# Patient Record
Sex: FEMALE | Race: WHITE | NOT HISPANIC OR LATINO | Employment: UNEMPLOYED | URBAN - METROPOLITAN AREA
[De-identification: names, ages, dates, MRNs, and addresses within clinical notes are randomized per-mention and may not be internally consistent; named-entity substitution may affect disease eponyms.]

---

## 2017-01-24 DIAGNOSIS — B37.31 YEAST INFECTION OF THE VAGINA: Primary | ICD-10-CM

## 2017-01-24 RX ORDER — FLUCONAZOLE 150 MG/1
150 TABLET ORAL ONCE
Qty: 2 TABLET | Refills: 3 | Status: SHIPPED | OUTPATIENT
Start: 2017-01-24 | End: 2017-01-24

## 2017-01-24 NOTE — TELEPHONE ENCOUNTER
Received refill request for Fluconazole for Estelle. Spoke with Estelle and she had to switch insurance and will no longer be able to see Dr. Gama. She gets yeast infections often and Dr. Gama had it set up so she could have refills available if needed. She is asking for some refills now while she works with her insurance to find a new OB/GYN provider. Will pend to Dr. Gama for approval.

## 2017-11-27 DIAGNOSIS — Z30.41 ENCOUNTER FOR SURVEILLANCE OF CONTRACEPTIVE PILLS: ICD-10-CM

## 2017-11-27 RX ORDER — DROSPIRENONE AND ETHINYL ESTRADIOL 0.02-3(28)
1 KIT ORAL DAILY
Qty: 112 TABLET | Refills: 3 | Status: SHIPPED | OUTPATIENT
Start: 2017-11-27 | End: 2018-11-06

## 2017-11-27 NOTE — TELEPHONE ENCOUNTER
Received refill request for OCP.  Last in clinic 7/2016.  OK to refill per protocol. Called and left voicemail encouraging pt schedule annual visit and informed refill sent to pharmacy

## 2018-11-06 ENCOUNTER — OFFICE VISIT (OUTPATIENT)
Dept: OBGYN | Facility: CLINIC | Age: 28
End: 2018-11-06
Attending: OBSTETRICS & GYNECOLOGY
Payer: COMMERCIAL

## 2018-11-06 VITALS
BODY MASS INDEX: 25.05 KG/M2 | HEIGHT: 66 IN | HEART RATE: 75 BPM | SYSTOLIC BLOOD PRESSURE: 125 MMHG | DIASTOLIC BLOOD PRESSURE: 80 MMHG | WEIGHT: 155.9 LBS

## 2018-11-06 DIAGNOSIS — Z31.69 PRE-CONCEPTION COUNSELING: ICD-10-CM

## 2018-11-06 DIAGNOSIS — Z23 NEED FOR PROPHYLACTIC VACCINATION AND INOCULATION AGAINST INFLUENZA: ICD-10-CM

## 2018-11-06 DIAGNOSIS — N90.7 LABIAL CYST: Primary | ICD-10-CM

## 2018-11-06 DIAGNOSIS — Z12.4 SCREENING FOR MALIGNANT NEOPLASM OF CERVIX: ICD-10-CM

## 2018-11-06 PROCEDURE — G0145 SCR C/V CYTO,THINLAYER,RESCR: HCPCS | Performed by: OBSTETRICS & GYNECOLOGY

## 2018-11-06 PROCEDURE — G0463 HOSPITAL OUTPT CLINIC VISIT: HCPCS | Mod: ZF

## 2018-11-06 PROCEDURE — G0008 ADMIN INFLUENZA VIRUS VAC: HCPCS | Mod: ZF

## 2018-11-06 PROCEDURE — 90686 IIV4 VACC NO PRSV 0.5 ML IM: CPT | Mod: ZF

## 2018-11-06 PROCEDURE — 25000128 H RX IP 250 OP 636: Mod: ZF

## 2018-11-06 PROCEDURE — 87624 HPV HI-RISK TYP POOLED RSLT: CPT | Performed by: OBSTETRICS & GYNECOLOGY

## 2018-11-06 RX ORDER — ZALEPLON 10 MG/1
10 CAPSULE ORAL PRN
COMMUNITY
End: 2019-09-17

## 2018-11-06 NOTE — LETTER
"2018       RE: Estelle Escobar  [address] [address]     Dear Colleague,    Thank you for referring your patient, Estelle Escobar, to the [facility] at [facility]. Please see a copy of my visit note below.    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   - She had symptoms of pelvic pain 2 weeks ago which has since subsided. At that time, she did a self visual exam and noticed some swelling on her left labial minora. She denied any drainage, cyst resolved on own. Did occur following shaving. Looked with mirror was on upper aspect of labia minora.     - She is also seeking fertility counseling. She discontinued her OCP in Aug. Her LMP was in mid Oct, but she does not know the exact date. Had irregular menses prior to starting pill. Is taking folic acid. Wonders if LEEP will affect fertility and/or delivery.    - Her last pap smear following LEEP was approximately 12 months ago with a different provider.    - Patient was recently  and considering open marriage. Had recent STI screening, wonders about frequency of screening, HPV vaccination.     Objective: /80 (BP Location: Right arm, Patient Position: Chair)  Pulse 75  Ht 1.676 m (5' 6\")  Wt 70.7 kg (155 lb 14.4 oz)  LMP 10/15/2018 (Approximate)    BMI 25.16 kg/m2    Pelvic Exam:  Vulva: No external lesions, normal hair distribution, normal architecture  Vagina: Moist, pink, no abnormal discharge, well rugated, no lesions  Cervix: smooth, pink, no visible lesions  Uterus: Normal size, anteverted, non-tender, mobile  Adnexa: Non-tender, no masses      Assessment:   28 year old P0 here for concerns of labial cyst which has resolved, pre-conception counseling as well as h/o BLANCO 2.    Plan:  - There is no evidence of " labial cyst or infection at this time. Patient was advised to follow up in the clinic if her symptoms return or if she has any abnormal vaginal bleeding or vaginal discharge. Reviewed shaving as risk factor for   - Preconception counseling. Patient was educated about the best strategies to increase her chances of pregnancy including tracking her menstrual cycles and having intercourse around the time of ovulation. Patient was informed of the different cell phone applications to assist her in tracking her menstrual cycles and time of ovulation. Is already taking folic acid.   - Patient is s/p LEEP 2 years ago. 12 month neg pap/neg HPV. Due for co-testing today. If negative can proceed with 3 year co-test.    - Reviewed STI screening options as needed. Also [facility] .     Follow up as needed    Isaiah Haro MS-3 acting as a scribe for Dr. Gama.   I, Dr. Gama, personally performed the services described in this documentation, as scribed by Isaiah Haro in my presence, and it is both accurate and complete.   Alina Gama MD           Again, thank you for allowing me to participate in the care of your patient.      Sincerely,    Alina Gama MD

## 2018-11-06 NOTE — MR AVS SNAPSHOT
"              After Visit Summary   11/6/2018    Estelle Escobar    MRN: [id number]           Patient Information     Date Of Birth          1990        Visit Information        Provider Department      11/6/2018 9:30 AM Alina Gama MD [facility]        Today's Diagnoses     Labial cyst    -  1    Need for prophylactic vaccination and inoculation against influenza        Screening for malignant neoplasm of cervix        Pre-conception counseling           Follow-ups after your visit        Who to contact     Please call your clinic at [phone] to:    Ask questions about your health    Make or cancel appointments    Discuss your medicines    Learn about your test results    Speak to your doctor            Additional Information About Your Visit        Ad[employer] Information     Subtext gives you secure access to your electronic health record. If you see a primary care provider, you can also send messages to your care team and make appointments. If you have questions, please call your primary care clinic.  If you do not have a primary care provider, please call [phone] and they will assist you.      Subtext is an electronic gateway that provides easy, online access to your medical records. With Subtext, you can request a clinic appointment, read your test results, renew a prescription or communicate with your care team.     To access your existing account, please contact your [facility] or call [phone] for assistance.        Care EveryWhere ID     This is your Care EveryWhere ID. This could be used by other organizations to access your [facility] medical records  [id number]        Your Vitals Were     Pulse Height Last Period Breastfeeding? BMI (Body Mass Index)       75 1.676 m (5' 6\") 10/15/2018 (Approximate) No 25.16 kg/m2        Blood Pressure from Last 3 Encounters:   11/06/18 125/80   08/30/16 123/80   07/12/16 123/80    Weight from " Last 3 Encounters:   11/06/18 70.7 kg (155 lb 14.4 oz)   08/30/16 71.4 kg (157 lb 6.4 oz)   07/12/16 70.6 kg (155 lb 9.6 oz)              We Performed the Following     HC FLU VAC PRESRV FREE QUAD SPLIT VIR 3+YRS IM     HPV High Risk Types DNA Cervical     Obtaining, preparing and conveyance of cervical or vaginal smear to laboratory.     Pap imaged thin layer screen with HPV - recommended age 30 - 65 years (select HPV order below)        Primary Care Provider    None Specified       No primary provider on file.        Equal Access to Services     [facility]: Hadjim Escobar, joan phillips, aristeo gale, maurisio sim . So [facility] [phone].    ATENCIÓN: Si habla español, tiene a guthrie disposición servicios gratuitos de asistencia lingüística. Llame al [phone].    We comply with applicable federal civil rights laws and [place] laws. We do not discriminate on the basis of race, color, national origin, age, disability, sex, sexual orientation, or gender identity.            Thank you!     Thank you for choosing [facility]  for your care. Our goal is always to provide you with excellent care. Hearing back from our patients is one way we can continue to improve our services. Please take a few minutes to complete the written survey that you may receive in the mail after your visit with us. Thank you!             Your Updated Medication List - Protect others around you: Learn how to safely use, store and throw away your medicines at www.disposemymeds.org.          This list is accurate as of 11/6/18 11:59 PM.  Always use your most recent med list.                   Brand Name Dispense Instructions for use Diagnosis    RITALIN 5 MG tablet   Generic drug:  methylphenidate      Take 5 mg by mouth 2 times daily        tretinoin 0.025 % cream    RETIN-A    45 g    Use every night to entire face at bedtime. Skip to every night if too  irritating.    Blackhead, Acne vulgaris       zaleplon 10 MG capsule    SONATA     Take 10 mg by mouth as needed for sleep

## 2018-11-06 NOTE — NURSING NOTE
Chief Complaint   Patient presents with     Follow Up For     Possible labial cyst and wants to discuss pregnancy planning and her menstrual cycle       See KENNETH Lin 11/6/2018    FLU VACCINE QUESTIONNAIRE:  Ask the following questions of all parties who want influenza vaccination:     CONTRAINDICATIONS  1.  Is the patient age less than 6 months?  NO  2.  Has the person to be vaccinated ever had Guillain-Carmine syndrome? NO  3.  Has the person to be vaccinated had the vaccine this year? NO  4.  Is the person to be vaccinated sick today? NO  5.  Does the person to be vaccinated have an allergy to eggs or a component of the vaccine? NO  6.  Has the person to vaccinated ever had a serious reaction to an influenza vaccination in the past? NO                             INFLUENZA VACCINATION NOTE      Information sheet given to patient and questions answered.

## 2018-11-06 NOTE — LETTER
Date:November 8, 2018      Patient was self referred, no letter generated. Do not send.        [facility] Information

## 2018-11-06 NOTE — PROGRESS NOTES
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   - She had symptoms of pelvic pain 2 weeks ago which has since subsided. At that time, she did a self visual exam and noticed some swelling on her left labial minora. She denied any drainage, cyst resolved on own. Did occur following shaving. Looked with mirror was on upper aspect of labia minora.     - She is also seeking fertility counseling. She discontinued her OCP in Aug. Her LMP was in mid Oct, but she does not know the exact date. Had irregular menses prior to starting pill. Is taking folic acid. Wonders if LEEP will affect fertility and/or delivery.    - Her last pap smear following LEEP was approximately 12 months ago with a different provider.    - Patient was recently  and considering open marriage. Had recent STI screening, wonders about frequency of screening, HPV vaccination.     Objective: /80 (BP Location: Right arm, Patient Position: Chair)  Pulse 75  Ht 1.676 m (5' 6\")  Wt 70.7 kg (155 lb 14.4 oz)  LMP 10/15/2018 (Approximate)    BMI 25.16 kg/m2    Pelvic Exam:  Vulva: No external lesions, normal hair distribution, normal architecture  Vagina: Moist, pink, no abnormal discharge, well rugated, no lesions  Cervix: smooth, pink, no visible lesions  Uterus: Normal size, anteverted, non-tender, mobile  Adnexa: Non-tender, no masses      Assessment:   28 year old P0 here for concerns of labial cyst which has resolved, pre-conception counseling as well as h/o BLANCO 2.    Plan:  - There is no evidence of labial cyst or infection at this time. Patient was advised to follow up in the clinic if her symptoms return or if she has any abnormal vaginal bleeding or vaginal discharge. Reviewed shaving as risk factor for   - Preconception counseling. Patient was educated about the best strategies to " increase her chances of pregnancy including tracking her menstrual cycles and having intercourse around the time of ovulation. Patient was informed of the different cell phone applications to assist her in tracking her menstrual cycles and time of ovulation. Is already taking folic acid.   - Patient is s/p LEEP 2 years ago. 12 month neg pap/neg HPV. Due for co-testing today. If negative can proceed with 3 year co-test.    - Reviewed STI screening options as needed. Also discussed [facility] .     Follow up as needed    Isaiah Haro MS-3 acting as a scribe for Dr. Gama.   I, Dr. Gama, personally performed the services described in this documentation, as scribed by Isaiah Haro in my presence, and it is both accurate and complete.   Alina Gama MD

## 2018-11-09 LAB
COPATH REPORT: NORMAL
PAP: NORMAL

## 2018-11-12 ENCOUNTER — TELEPHONE (OUTPATIENT)
Dept: OBGYN | Facility: CLINIC | Age: 28
End: 2018-11-12

## 2018-11-12 LAB
FINAL DIAGNOSIS: NORMAL
HPV HR 12 DNA CVX QL NAA+PROBE: NEGATIVE
HPV16 DNA SPEC QL NAA+PROBE: NEGATIVE
HPV18 DNA SPEC QL NAA+PROBE: NEGATIVE
SPECIMEN DESCRIPTION: NORMAL
SPECIMEN SOURCE CVX/VAG CYTO: NORMAL

## 2019-02-07 ENCOUNTER — TELEPHONE (OUTPATIENT)
Dept: OBGYN | Facility: CLINIC | Age: 29
End: 2019-02-07

## 2019-02-07 DIAGNOSIS — N91.4 SECONDARY OLIGOMENORRHEA: Primary | ICD-10-CM

## 2019-02-07 NOTE — TELEPHONE ENCOUNTER
Spoke with Estelle who is attempting pregnancy with her  since September. She states she has always struggled with irregular periods and has history of CIN2 with LEEP.  She has now missed two cycles. Her LMP is 12/19. She has taken multiple negative pregnancy tests. She is wondering if she needs to do anything at this time. Does she need to come in or does she need blood testing. Nurse will route to Dr. Gama for advice.

## 2019-02-07 NOTE — TELEPHONE ENCOUNTER
Spoke with Estelle. Dr. Gama ordered labs and would like her to follow up in clinic after labwork is done. Arranged for appointment at 2pm on 2/13/19. Patient agreeable to this plan.

## 2019-02-08 DIAGNOSIS — N91.4 SECONDARY OLIGOMENORRHEA: ICD-10-CM

## 2019-02-08 LAB — PROLACTIN SERPL-MCNC: 4 UG/L (ref 3–27)

## 2019-02-08 PROCEDURE — 84270 ASSAY OF SEX HORMONE GLOBUL: CPT | Performed by: OBSTETRICS & GYNECOLOGY

## 2019-02-08 PROCEDURE — 84443 ASSAY THYROID STIM HORMONE: CPT | Performed by: OBSTETRICS & GYNECOLOGY

## 2019-02-08 PROCEDURE — 84146 ASSAY OF PROLACTIN: CPT | Performed by: OBSTETRICS & GYNECOLOGY

## 2019-02-08 PROCEDURE — 36415 COLL VENOUS BLD VENIPUNCTURE: CPT | Performed by: OBSTETRICS & GYNECOLOGY

## 2019-02-08 PROCEDURE — 84403 ASSAY OF TOTAL TESTOSTERONE: CPT | Performed by: OBSTETRICS & GYNECOLOGY

## 2019-02-09 LAB — TSH SERPL DL<=0.005 MIU/L-ACNC: 1.08 MU/L (ref 0.4–4)

## 2019-02-12 LAB
SHBG SERPL-SCNC: 57 NMOL/L (ref 30–135)
TESTOST FREE SERPL-MCNC: 0.3 NG/DL (ref 0.08–0.74)
TESTOST SERPL-MCNC: 25 NG/DL (ref 8–60)

## 2019-07-17 ENCOUNTER — TELEPHONE (OUTPATIENT)
Dept: OBGYN | Facility: CLINIC | Age: 29
End: 2019-07-17

## 2019-07-17 NOTE — TELEPHONE ENCOUNTER
Estelle called to discuss receiving HPV vaccine (new formulation). Estelle did receive series previously, but is interested in new vaccine if appropriate. Pt is newly pregnant and has intake scheduled in 3 weeks.    Advised pt it is not recommended to receive HPV vaccine during pregnancy and not recommended currently to receive new series if previously received full HPV series. Patient expressed understanding, but would like to discuss further a upcoming appointment. Added to appt notes

## 2019-08-04 PROBLEM — Z34.01 SUPERVISION OF NORMAL FIRST PREGNANCY IN FIRST TRIMESTER: Status: ACTIVE | Noted: 2019-08-04

## 2019-08-05 ENCOUNTER — ANCILLARY PROCEDURE (OUTPATIENT)
Dept: ULTRASOUND IMAGING | Facility: CLINIC | Age: 29
End: 2019-08-05
Attending: ADVANCED PRACTICE MIDWIFE
Payer: COMMERCIAL

## 2019-08-05 ENCOUNTER — OFFICE VISIT (OUTPATIENT)
Dept: OBGYN | Facility: CLINIC | Age: 29
End: 2019-08-05
Attending: ADVANCED PRACTICE MIDWIFE
Payer: COMMERCIAL

## 2019-08-05 VITALS
SYSTOLIC BLOOD PRESSURE: 120 MMHG | DIASTOLIC BLOOD PRESSURE: 80 MMHG | HEART RATE: 87 BPM | HEIGHT: 66 IN | WEIGHT: 149.1 LBS | BODY MASS INDEX: 23.96 KG/M2

## 2019-08-05 DIAGNOSIS — Z34.91 ENCOUNTER FOR SUPERVISION OF NORMAL PREGNANCY IN FIRST TRIMESTER, UNSPECIFIED GRAVIDITY: ICD-10-CM

## 2019-08-05 DIAGNOSIS — Z34.01 SUPERVISION OF NORMAL FIRST PREGNANCY IN FIRST TRIMESTER: ICD-10-CM

## 2019-08-05 LAB
ABO + RH BLD: NORMAL
ABO + RH BLD: NORMAL
BASOPHILS # BLD AUTO: 0 10E9/L (ref 0–0.2)
BASOPHILS NFR BLD AUTO: 0.1 %
BLD GP AB SCN SERPL QL: NORMAL
BLOOD BANK CMNT PATIENT-IMP: NORMAL
DIFFERENTIAL METHOD BLD: NORMAL
EOSINOPHIL # BLD AUTO: 0.1 10E9/L (ref 0–0.7)
EOSINOPHIL NFR BLD AUTO: 0.7 %
ERYTHROCYTE [DISTWIDTH] IN BLOOD BY AUTOMATED COUNT: 12.1 % (ref 10–15)
HCT VFR BLD AUTO: 40.3 % (ref 35–47)
HGB BLD-MCNC: 13.4 G/DL (ref 11.7–15.7)
IMM GRANULOCYTES # BLD: 0 10E9/L (ref 0–0.4)
IMM GRANULOCYTES NFR BLD: 0.3 %
LYMPHOCYTES # BLD AUTO: 2.6 10E9/L (ref 0.8–5.3)
LYMPHOCYTES NFR BLD AUTO: 25.8 %
MCH RBC QN AUTO: 30.9 PG (ref 26.5–33)
MCHC RBC AUTO-ENTMCNC: 33.3 G/DL (ref 31.5–36.5)
MCV RBC AUTO: 93 FL (ref 78–100)
MONOCYTES # BLD AUTO: 0.6 10E9/L (ref 0–1.3)
MONOCYTES NFR BLD AUTO: 6.1 %
NEUTROPHILS # BLD AUTO: 6.7 10E9/L (ref 1.6–8.3)
NEUTROPHILS NFR BLD AUTO: 67 %
NRBC # BLD AUTO: 0 10*3/UL
NRBC BLD AUTO-RTO: 0 /100
PLATELET # BLD AUTO: 183 10E9/L (ref 150–450)
RBC # BLD AUTO: 4.33 10E12/L (ref 3.8–5.2)
SPECIMEN EXP DATE BLD: NORMAL
WBC # BLD AUTO: 10 10E9/L (ref 4–11)

## 2019-08-05 PROCEDURE — 86762 RUBELLA ANTIBODY: CPT | Performed by: ADVANCED PRACTICE MIDWIFE

## 2019-08-05 PROCEDURE — 82306 VITAMIN D 25 HYDROXY: CPT | Performed by: ADVANCED PRACTICE MIDWIFE

## 2019-08-05 PROCEDURE — 87389 HIV-1 AG W/HIV-1&-2 AB AG IA: CPT | Performed by: ADVANCED PRACTICE MIDWIFE

## 2019-08-05 PROCEDURE — 86850 RBC ANTIBODY SCREEN: CPT | Performed by: ADVANCED PRACTICE MIDWIFE

## 2019-08-05 PROCEDURE — 86787 VARICELLA-ZOSTER ANTIBODY: CPT | Performed by: ADVANCED PRACTICE MIDWIFE

## 2019-08-05 PROCEDURE — 87340 HEPATITIS B SURFACE AG IA: CPT | Performed by: ADVANCED PRACTICE MIDWIFE

## 2019-08-05 PROCEDURE — 86706 HEP B SURFACE ANTIBODY: CPT | Performed by: ADVANCED PRACTICE MIDWIFE

## 2019-08-05 PROCEDURE — G0463 HOSPITAL OUTPT CLINIC VISIT: HCPCS

## 2019-08-05 PROCEDURE — 86780 TREPONEMA PALLIDUM: CPT | Performed by: ADVANCED PRACTICE MIDWIFE

## 2019-08-05 PROCEDURE — 86900 BLOOD TYPING SEROLOGIC ABO: CPT | Performed by: ADVANCED PRACTICE MIDWIFE

## 2019-08-05 PROCEDURE — 86901 BLOOD TYPING SEROLOGIC RH(D): CPT | Performed by: ADVANCED PRACTICE MIDWIFE

## 2019-08-05 PROCEDURE — 76801 OB US < 14 WKS SINGLE FETUS: CPT

## 2019-08-05 PROCEDURE — 87086 URINE CULTURE/COLONY COUNT: CPT | Performed by: ADVANCED PRACTICE MIDWIFE

## 2019-08-05 PROCEDURE — 85025 COMPLETE CBC W/AUTO DIFF WBC: CPT | Performed by: ADVANCED PRACTICE MIDWIFE

## 2019-08-05 PROCEDURE — 36415 COLL VENOUS BLD VENIPUNCTURE: CPT | Performed by: ADVANCED PRACTICE MIDWIFE

## 2019-08-05 PROCEDURE — 86803 HEPATITIS C AB TEST: CPT | Performed by: ADVANCED PRACTICE MIDWIFE

## 2019-08-05 ASSESSMENT — MIFFLIN-ST. JEOR: SCORE: 1423.06

## 2019-08-05 NOTE — PROGRESS NOTES
[facility] OB Intake note  Subjective   28 year old woman presents to clinic for initiation of OB care with her , Tian. They decided to attempt pregnancy last August when she stopped birth control pills - she was nervous about a family history significant for early menopause.  Patient's last menstrual period was 06/10/2019.  at 8w0d by LMP Estimated Date of Delivery: Mar 16, 2020 based on LMP. Reviewed dating ultrasound which shows di/di twins!. Pregnancy is planned.      Symptoms since LMP include nausea and fatigue. Patient has tried these relief measures: small frequent meals, increased rest and increased fluids.    - Genetic/Infection questionnaire completed, risks include: binge drank on 19, was taking ritalin and sonata until the 19. Pt  does not have a recent known exposure to Parvo or CMV so IgG/IgM testing WILL NOT be ordered.   Have you traveled during the pregnancy?No  Have your sexual partner(s) travelled during the pregnancy?No    - Current Medications:   Current Outpatient Medications   Medication Sig Dispense Refill     methylphenidate (RITALIN) 5 MG tablet Take 5 mg by mouth 2 times daily       tretinoin (RETIN-A) 0.025 % cream Use every night to entire face at bedtime. Skip to every night if too irritating. (Patient not taking: Reported on 2019) 45 g 11     zaleplon (SONATA) 10 MG capsule Take 10 mg by mouth as needed for sleep       - Co-morbids    Past Medical History:   Diagnosis Date     Abnormal Pap smear of cervix 2013     -Has had two NILM PAPs (16 with +HPV and 2018 with -HPV) hx of biopsy x2.    - Risk for GDM -  does not  have Personal history of GDM, BMI>30, h/o prediabetes/glucose intolerance, first degree relative with GDM or DM    WILL NOT have an early GCT and possible Hgb A1C    - High Risk for Pre E- meets one of following criteria The patient does h/o Pre Eclampsia, Current multi fetal gestation, Pre Gestational Diabetes (Type 1 or Type 2), chronic  hypertension, renal disease, Autoimmune disease (systematic lupus erythematosus, antiphospholipid syndrome) so WILL start low dose aspirin (81mg) starting between 12 and 28 weeks to prevent early onset preeclampsia.    - Moderate risk - meets more than one of several moderate risk facrtors for Pre E - Nulliparity, Obesity (body mass index >30 kg/m2),Family history of preeclampsia (mother or sister), Sociodemographic characteristics ( race, low socioeconomic status), Age ?35 years, Personal history factors (e.g., low birthweight or small for gestational age, previous adverse pregnancy outcome, >10-year pregnancy interval)  so WILL NOT consider starting low dose aspirin (81mg) starting between 12 and 28 weeks to prevent early onset preeclampsia.    - The patient  does not have a history of spontaneous  birth so  WILL NOT consider progesterone starting at 16-20 weeks and/or serial transvaginal cervical length ultrasounds from 16-24 weeks.     -The patient does not have a history of immunosuppresion or HIV so Toxoplasma IgG/IgM WILL NOT be ordered.     PERSONAL/SOCIAL HISTORY   lives with their spouse and two dogs.  Employment: Full time. Her job involves light activity . 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  History of anxiety or depression - no    Additional items: Denies past or present domestic violence, sexual and psychological abuse.    Objective  -VS: reviewed and within normal limits   -General appearance: no acute distress, patient is comfortable   NEUROLOGICAL/PSYCHIATRIC   - Orientated x3,   -Mood and affect: : normal     See US report with today's date.    Assessment/Plan  Estelle was seen today for prenatal care.    Diagnoses and all orders for this visit:    Supervision of normal first pregnancy in first trimester  -     25- OH-Vitamin D  -     ABO/Rh Type and Screen  -     CBC with Platelets Differential  -     Hepatitis B Surface Antigen  -     HIV  Antigen Antibody Combo  -     Rubella Antibody IgG Quantitative  -     Treponema Abs w Reflex to RPR and Titer  -     Urine Culture Aerobic Bacterial  -     Hepatitis C antibody  -     Hepatitis B Surface Antibody  -     Varicella Zoster Virus Antibody IgG    28 year old  8w0d weeks of pregnancy with ELENA of Mar 16, 2020 by LMP of Patient's last menstrual period was 06/10/2019.. Ultrasound confirms.   Outpatient Encounter Medications as of 2019   Medication Sig Dispense Refill     methylphenidate (RITALIN) 5 MG tablet Take 5 mg by mouth 2 times daily       tretinoin (RETIN-A) 0.025 % cream Use every night to entire face at bedtime. Skip to every night if too irritating. (Patient not taking: Reported on 2019) 45 g 11     zaleplon (SONATA) 10 MG capsule Take 10 mg by mouth as needed for sleep       No facility-administered encounter medications on file as of 2019.       Orders Placed This Encounter   Procedures     25- OH-Vitamin D     CBC with Platelets Differential     Hepatitis B Surface Antigen     HIV Antigen Antibody Combo     Rubella Antibody IgG Quantitative     Treponema Abs w Reflex to RPR and Titer     Hepatitis C antibody     Hepatitis B Surface Antibody     Varicella Zoster Virus Antibody IgG     ABO/Rh Type and Screen                 Orders Placed This Encounter   Procedures     25- OH-Vitamin D     CBC with Platelets Differential     Hepatitis B Surface Antigen     HIV Antigen Antibody Combo     Rubella Antibody IgG Quantitative     Treponema Abs w Reflex to RPR and Titer     Hepatitis C antibody     Hepatitis B Surface Antibody     Varicella Zoster Virus Antibody IgG     ABO/Rh Type and Screen     - Oriented to Practice, types of care, and how to reach a provider.  Pt prefers MD for twin pregnancy - has seen Lanette for her hx of abnormal pap's.  - Patient received 1st trimester new OB education packet complete with aide of [employer] [employer] booklet including information on genetic  screening test options.  - Patient desires 1st trimester screening which was ordered.  - Educational handout on the prevention of infections diseases during pregnancy provided.  - Patient was encouraged to start prenatal vitamins as tolerated.    - Patient was sent to lab for routine OB labs including varicella (pt was unsure if she had chicken pox as child).  Hepatitis C screening was ordered.   - Pregnancy concerns to be addressed by provider at new OB exam include: please discuss recommendation for starting asa for multifetal pregnancy, please review if LEEP was actually done - only noted biopsies with colposcopies in notes.     Pt to RTO for NOB visit with US in 2 weeks and prn if questions or concerns    Xiomy Sanchez CNM

## 2019-08-05 NOTE — LETTER
2019       RE: Estelle Escobar  [address] [facility] [address]     Dear Colleague,    Thank you for referring your patient, Estelle Escobar, to the [facility] at [facility]. Please see a copy of my visit note below.    [facility] OB Intake note  Subjective   28 year old woman presents to clinic for initiation of OB care with her , Tian. They decided to attempt pregnancy last August when she stopped birth control pills - she was nervous about a family history significant for early menopause.  Patient's last menstrual period was 06/10/2019.  at 8w0d by LMP Estimated Date of Delivery: Mar 16, 2020 based on LMP. Reviewed dating ultrasound which shows di/di twins!. Pregnancy is planned.      Symptoms since LMP include nausea and fatigue. Patient has tried these relief measures: small frequent meals, increased rest and increased fluids.    - Genetic/Infection questionnaire completed, risks include: binge drank on 19, was taking ritalin and sonata until the 19. Pt  does not have a recent known exposure to Parvo or CMV so IgG/IgM testing WILL NOT be ordered.   Have you traveled during the pregnancy?No  Have your sexual partner(s) travelled during the pregnancy?No    - Current Medications:   Current Outpatient Medications   Medication Sig Dispense Refill     methylphenidate (RITALIN) 5 MG tablet Take 5 mg by mouth 2 times daily       tretinoin (RETIN-A) 0.025 % cream Use every night to entire face at bedtime. Skip to every night if too irritating. (Patient not taking: Reported on 2019) 45 g 11     zaleplon (SONATA) 10 MG capsule Take 10 mg by mouth as needed for sleep       - Co-morbids    Past Medical History:   Diagnosis Date     Abnormal Pap smear of cervix 2013     -Has had two NILM PAPs (16 with +HPV and 2018 with -HPV) hx of biopsy x2.    - Risk for GDM -  does not  have Personal history of GDM, BMI>30, h/o  prediabetes/glucose intolerance, first degree relative with GDM or DM    WILL NOT have an early GCT and possible Hgb A1C    - High Risk for Pre E- meets one of following criteria The patient does h/o Pre Eclampsia, Current multi fetal gestation, Pre Gestational Diabetes (Type 1 or Type 2), chronic hypertension, renal disease, Autoimmune disease (systematic lupus erythematosus, antiphospholipid syndrome) so WILL start low dose aspirin (81mg) starting between 12 and 28 weeks to prevent early onset preeclampsia.    - Moderate risk - meets more than one of several moderate risk facrtors for Pre E - Nulliparity, Obesity (body mass index >30 kg/m2),Family history of preeclampsia (mother or sister), Sociodemographic characteristics ( race, low socioeconomic status), Age ?35 years, Personal history factors (e.g., low birthweight or small for gestational age, previous adverse pregnancy outcome, >10-year pregnancy interval)  so WILL NOT consider starting low dose aspirin (81mg) starting between 12 and 28 weeks to prevent early onset preeclampsia.    - The patient  does not have a history of spontaneous  birth so  WILL NOT consider progesterone starting at 16-20 weeks and/or serial transvaginal cervical length ultrasounds from 16-24 weeks.     -The patient does not have a history of immunosuppresion or HIV so Toxoplasma IgG/IgM WILL NOT be ordered.     PERSONAL/SOCIAL HISTORY   lives with their spouse and two dogs.  Employment: Full time. Her job involves light activity . 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  History of anxiety or depression - no    Additional items: Denies past or present domestic violence, sexual and psychological abuse.    Objective  -VS: reviewed and within normal limits   -General appearance: no acute distress, patient is comfortable   NEUROLOGICAL/PSYCHIATRIC   - Orientated x3,   -Mood and affect: : normal     See US report with today's  date.    Assessment/Plan  Estelle was seen today for prenatal care.    Diagnoses and all orders for this visit:    Supervision of normal first pregnancy in first trimester  -     25- OH-Vitamin D  -     ABO/Rh Type and Screen  -     CBC with Platelets Differential  -     Hepatitis B Surface Antigen  -     HIV Antigen Antibody Combo  -     Rubella Antibody IgG Quantitative  -     Treponema Abs w Reflex to RPR and Titer  -     Urine Culture Aerobic Bacterial  -     Hepatitis C antibody  -     Hepatitis B Surface Antibody  -     Varicella Zoster Virus Antibody IgG    28 year old  8w0d weeks of pregnancy with ELENA of Mar 16, 2020 by LMP of Patient's last menstrual period was 06/10/2019.. Ultrasound confirms.   Outpatient Encounter Medications as of 2019   Medication Sig Dispense Refill     methylphenidate (RITALIN) 5 MG tablet Take 5 mg by mouth 2 times daily       tretinoin (RETIN-A) 0.025 % cream Use every night to entire face at bedtime. Skip to every night if too irritating. (Patient not taking: Reported on 2019) 45 g 11     zaleplon (SONATA) 10 MG capsule Take 10 mg by mouth as needed for sleep       No facility-administered encounter medications on file as of 2019.       Orders Placed This Encounter   Procedures     25- OH-Vitamin D     CBC with Platelets Differential     Hepatitis B Surface Antigen     HIV Antigen Antibody Combo     Rubella Antibody IgG Quantitative     Treponema Abs w Reflex to RPR and Titer     Hepatitis C antibody     Hepatitis B Surface Antibody     Varicella Zoster Virus Antibody IgG     ABO/Rh Type and Screen                 Orders Placed This Encounter   Procedures     25- OH-Vitamin D     CBC with Platelets Differential     Hepatitis B Surface Antigen     HIV Antigen Antibody Combo     Rubella Antibody IgG Quantitative     Treponema Abs w Reflex to RPR and Titer     Hepatitis C antibody     Hepatitis B Surface Antibody     Varicella Zoster Virus Antibody IgG     ABO/Rh  Type and Screen     - Oriented to Practice, types of care, and how to reach a provider.  Pt prefers MD for twin pregnancy - has seen Lanette for her hx of abnormal pap's.  - Patient received 1st trimester new OB education packet complete with aide of [facility] Expect[facility] booklet including information on genetic screening test options.  - Patient desires 1st trimester screening which was ordered.  - Educational handout on the prevention of infections diseases during pregnancy provided.  - Patient was encouraged to start prenatal vitamins as tolerated.    - Patient was sent to lab for routine OB labs including varicella (pt was unsure if she had chicken pox as child).  Hepatitis C screening was ordered.   - Pregnancy concerns to be addressed by provider at new OB exam include: please discuss recommendation for starting asa for multifetal pregnancy, please review if LEEP was actually done - only noted biopsies with colposcopies in notes.     Pt to RTO for NOB visit with US in 2 weeks and prn if questions or concerns    Xiomy Sanchez CNM

## 2019-08-06 ENCOUNTER — TRANSCRIBE ORDERS (OUTPATIENT)
Dept: MATERNAL FETAL MEDICINE | Facility: CLINIC | Age: 29
End: 2019-08-06

## 2019-08-06 DIAGNOSIS — O26.90 PREGNANCY RELATED CONDITION, ANTEPARTUM: Primary | ICD-10-CM

## 2019-08-06 LAB
BACTERIA SPEC CULT: NO GROWTH
DEPRECATED CALCIDIOL+CALCIFEROL SERPL-MC: 46 UG/L (ref 20–75)
HBV SURFACE AB SERPL IA-ACNC: 264.57 M[IU]/ML
HBV SURFACE AG SERPL QL IA: NONREACTIVE
HCV AB SERPL QL IA: NONREACTIVE
HIV 1+2 AB+HIV1 P24 AG SERPL QL IA: NONREACTIVE
Lab: NORMAL
RUBV IGG SERPL IA-ACNC: 19 IU/ML
SPECIMEN SOURCE: NORMAL
T PALLIDUM AB SER QL: NONREACTIVE
VZV IGG SER QL IA: 5 AI (ref 0–0.8)

## 2019-08-21 ENCOUNTER — ANCILLARY PROCEDURE (OUTPATIENT)
Dept: ULTRASOUND IMAGING | Facility: CLINIC | Age: 29
End: 2019-08-21
Attending: ADVANCED PRACTICE MIDWIFE
Payer: COMMERCIAL

## 2019-08-21 ENCOUNTER — OFFICE VISIT (OUTPATIENT)
Dept: OBGYN | Facility: CLINIC | Age: 29
End: 2019-08-21
Attending: ADVANCED PRACTICE MIDWIFE
Payer: COMMERCIAL

## 2019-08-21 VITALS
WEIGHT: 147.1 LBS | DIASTOLIC BLOOD PRESSURE: 75 MMHG | HEART RATE: 91 BPM | SYSTOLIC BLOOD PRESSURE: 119 MMHG | HEIGHT: 66 IN | BODY MASS INDEX: 23.64 KG/M2

## 2019-08-21 DIAGNOSIS — O30.001 TWIN GESTATION IN FIRST TRIMESTER, UNSPECIFIED MULTIPLE GESTATION TYPE: Primary | ICD-10-CM

## 2019-08-21 DIAGNOSIS — Z34.91 ENCOUNTER FOR SUPERVISION OF NORMAL PREGNANCY IN FIRST TRIMESTER, UNSPECIFIED GRAVIDITY: ICD-10-CM

## 2019-08-21 PROBLEM — O30.009 TWIN PREGNANCY: Status: ACTIVE | Noted: 2019-08-04

## 2019-08-21 LAB
C TRACH DNA SPEC QL NAA+PROBE: NORMAL
N GONORRHOEA DNA SPEC QL NAA+PROBE: NORMAL
SPECIMEN SOURCE: NORMAL
SPECIMEN SOURCE: NORMAL

## 2019-08-21 PROCEDURE — G0463 HOSPITAL OUTPT CLINIC VISIT: HCPCS | Mod: 25,ZF

## 2019-08-21 PROCEDURE — 76801 OB US < 14 WKS SINGLE FETUS: CPT

## 2019-08-21 RX ORDER — ASPIRIN 81 MG/1
81 TABLET, CHEWABLE ORAL DAILY
Qty: 90 TABLET | Refills: 3 | Status: ON HOLD | OUTPATIENT
Start: 2019-09-02 | End: 2020-01-02

## 2019-08-21 RX ORDER — ASPIRIN 81 MG/1
81 TABLET, CHEWABLE ORAL ONCE
Status: CANCELLED | OUTPATIENT
Start: 2019-08-21 | End: 2019-08-21

## 2019-08-21 ASSESSMENT — ANXIETY QUESTIONNAIRES
3. WORRYING TOO MUCH ABOUT DIFFERENT THINGS: NOT AT ALL
5. BEING SO RESTLESS THAT IT IS HARD TO SIT STILL: NOT AT ALL
1. FEELING NERVOUS, ANXIOUS, OR ON EDGE: SEVERAL DAYS
GAD7 TOTAL SCORE: 2
6. BECOMING EASILY ANNOYED OR IRRITABLE: NOT AT ALL
2. NOT BEING ABLE TO STOP OR CONTROL WORRYING: NOT AT ALL
7. FEELING AFRAID AS IF SOMETHING AWFUL MIGHT HAPPEN: SEVERAL DAYS

## 2019-08-21 ASSESSMENT — MIFFLIN-ST. JEOR: SCORE: 1413.99

## 2019-08-21 ASSESSMENT — PATIENT HEALTH QUESTIONNAIRE - PHQ9: 5. POOR APPETITE OR OVEREATING: NOT AT ALL

## 2019-08-21 NOTE — PROGRESS NOTES
Subjective:   Estelle is a 28 year old female who presents to clinic for a new OB visit.   at 10w2d with Estimated Date of Delivery: Mar 16, 2020 based on LMP. Feels well. Has started PNV.   She has not had bleeding since her LMP.   She has had mild nausea. Weight loss has occurred, < 2 lbs.   This was a planned pregnancy.   FOB is involved, Jordan.     OTHER CONCERNS:    - Questions re: genetic screening, risk of miscarriage, secondhand smoke exposure   - Reviewed subchorionic hemorrhage on ultrasound    ===========================================  ROS: 10 point ROS neg other than the symptoms noted above in the HPI.    PSYCHIATRIC:  Denies mood changes  PHQ-9 score:    PHQ-9 SCORE 2015   PHQ-9 Total Score -   PHQ-9 Total Score 6     SEBASTIÁN-7 SCORE 2019   Total Score 2     Past History:  Her past medical history   Past Medical History:   Diagnosis Date     Abnormal Pap smear of cervix 2013     This is her first pregnancy  Since her last LMP she denies use of alcohol, tobacco and street drugs.  HISTORY:  Family History   Problem Relation Age of Onset     Cancer Maternal Grandfather         basal cell      Skin Cancer Maternal Grandfather      Diabetes Paternal Grandmother      Diabetes Paternal Grandfather      Social History     Socioeconomic History     Marital status:      Spouse name: Not on file     Number of children: Not on file     Years of education: Not on file     Highest education level: Not on file   Occupational History     Not on file   Social Needs     Financial resource strain: Not on file     Food insecurity:     Worry: Not on file     Inability: Not on file     Transportation needs:     Medical: Not on file     Non-medical: Not on file   Tobacco Use     Smoking status: Never Smoker     Smokeless tobacco: Never Used   Substance and Sexual Activity     Alcohol use: Yes     Alcohol/week: 0.0 - 0.6 oz     Drug use: No     Sexual activity: Yes     Partners: Male     Birth  control/protection: None   Lifestyle     Physical activity:     Days per week: Not on file     Minutes per session: Not on file     Stress: Not on file   Relationships     Social connections:     Talks on phone: Not on file     Gets together: Not on file     Attends Roman Catholic service: Not on file     Active member of club or organization: Not on file     Attends meetings of clubs or organizations: Not on file     Relationship status: Not on file     Intimate partner violence:     Fear of current or ex partner: Not on file     Emotionally abused: Not on file     Physically abused: Not on file     Forced sexual activity: Not on file   Other Topics Concern      Service Not Asked     Blood Transfusions Not Asked     Caffeine Concern No     Occupational Exposure Not Asked     Hobby Hazards Not Asked     Sleep Concern No     Stress Concern No     Weight Concern No     Special Diet Not Asked     Back Care No     Exercise Yes     Bike Helmet Not Asked     Seat Belt Yes     Self-Exams Yes     Parent/sibling w/ CABG, MI or angioplasty before 65F 55M? Not Asked   Social History Narrative     Not on file     Current Outpatient Medications   Medication Sig     Prenatal MV-Min-Fe Fum-FA-DHA (PRENATAL 1 PO)      methylphenidate (RITALIN) 5 MG tablet Take 5 mg by mouth 2 times daily     tretinoin (RETIN-A) 0.025 % cream Use every night to entire face at bedtime. Skip to every night if too irritating. (Patient not taking: Reported on 8/5/2019)     zaleplon (SONATA) 10 MG capsule Take 10 mg by mouth as needed for sleep     No current facility-administered medications for this visit.      No Known Allergies    ============================================  MEDICAL HISTORY  Past Medical History:   Diagnosis Date     Abnormal Pap smear of cervix 6/7/2013     Past Surgical History:   Procedure Laterality Date     ARTHROSCOPIC RECONSTRUCTION ANTERIOR CRUCIATE LIGAMENT       EXTRACTION(S) DENTAL       LEEP TX, CERVICAL  8/30/2016  "    OB History    Para Term  AB Living   1 0 0 0 0 0   SAB TAB Ectopic Multiple Live Births   0 0 0 0 0      # Outcome Date GA Lbr Amilcar/2nd Weight Sex Delivery Anes PTL Lv   1 Current              GYN History- Hx of Abnormal Pap Smears; last pap 18 NIL, HPV neg.                        Cervical procedures: LEEP                        History of STI: HPV    I personally reviewed the past social/family/medical and surgical history on the date of service.   I reviewed lab work done at Intake visit with patient.    EXAM:  /75 (BP Location: Left arm, Patient Position: Chair)   Pulse 91   Ht 1.676 m (5' 6\")   Wt 66.7 kg (147 lb 1.6 oz)   LMP 06/10/2019   BMI 23.74 kg/m     EXAM:  GENERAL:  Pleasant pregnant female, alert, cooperative and well groomed.  SKIN:  Warm and dry, without lesions or rashes  HEAD: Symmetrical features.  MOUTH:  Buccal mucosa pink, moist without lesions.  Teeth in good repair.    NECK:  Thyroid without enlargement and nodules.  Lymph nodes not palpable.   LUNGS:  Clear to auscultation.  BREAST:    No dominant, fixed or suspicious masses are noted.  No skin or nipple changes or axillary nodes.   Nipples everted.  Well-healed scar out outer R breast.   HEART:  RRR without murmur.  ABDOMEN: Soft without masses , tenderness or organomegaly.  No CVA tenderness.  Uterus not palpable appropriate for dates.  No scars noted.  MUSCULOSKELETAL:  Full range of motion  EXTREMITIES:  No edema. No significant varicosities.   PELVIC EXAM: Deferred  WET PREP:Not done  GC/CHLAMYDIA CULTURE OBTAINED:YES    Lab Results   Component Value Date    PAP NIL 2018      Assessment:  28 year old , 10w2d weeks of pregnancy with ELENA of Mar 16, 2020 by LMP  Genetic Screening: First Trimester Screen    ICD-10-CM    1. Twin gestation in first trimester, unspecified multiple gestation type O30.001 Gonorrhea PCR     Chlamydia PCR (Clinic Collect)     Plan:  - Reviewed use of triage nurse line " and contacting the on-call provider after hours for an urgent need such as fever, vagina bleeding, bladder or vaginal infection, rupture of membranes,  or term labor.  - Reviewed best evidence for: weight gain for her weight and height for pregnancy:  Based on pre-pregnancy weight of 149 and Body mass index is 23.74 kg/m . RECOMMENDED WEIGHT GAIN: 25-35 lbs.  - Reviewed healthy diet and foods to avoid; exercise and activity during pregnancy; avoiding exposure to toxoplasmosis; and maintenance of a generally healthy lifestyle.   - Discussed the harms, benefits, side effects and alternative therapies for current prescribed and OTC medications.  - Reviewed high risk for Pre Eclampsia d/t twin pregnancy,  agreeable to starting 81mg aspirin daily after 12 weeks. Rx sent.  - All pt's and partner's questions discussed and answered.  Pt verbalized understanding of and agreement to plan of care.   - Continue scheduled prenatal care and prn if questions or concerns, RTC in 4 weeks CATHIE Kennedy CNM

## 2019-08-21 NOTE — LETTER
2019       RE: Estelle Escobar  [address] [facility] [address]     Dear Colleague,    Thank you for referring your patient, Estelle Escobar, to the [facility] at [facility]. Please see a copy of my visit note below.    Subjective:   Estelle is a 28 year old female who presents to clinic for a new OB visit.   at 10w2d with Estimated Date of Delivery: Mar 16, 2020 based on LMP. Feels well. Has started PNV.   She has not had bleeding since her LMP.   She has had mild nausea. Weight loss has occurred, < 2 lbs.   This was a planned pregnancy.   FOB is involved, Jordan.     OTHER CONCERNS:    - Questions re: genetic screening, risk of miscarriage, secondhand smoke exposure   - Reviewed subchorionic hemorrhage on ultrasound    ===========================================  ROS: 10 point ROS neg other than the symptoms noted above in the HPI.    PSYCHIATRIC:  Denies mood changes  PHQ-9 score:    PHQ-9 SCORE 2015   PHQ-9 Total Score -   PHQ-9 Total Score 6     SEBASTIÁN-7 SCORE 2019   Total Score 2     Past History:  Her past medical history   Past Medical History:   Diagnosis Date     Abnormal Pap smear of cervix 2013     This is her first pregnancy  Since her last LMP she denies use of alcohol, tobacco and street drugs.  HISTORY:  Family History   Problem Relation Age of Onset     Cancer Maternal Grandfather         basal cell      Skin Cancer Maternal Grandfather      Diabetes Paternal Grandmother      Diabetes Paternal Grandfather      Social History     Socioeconomic History     Marital status:      Spouse name: Not on file     Number of children: Not on file     Years of education: Not on file     Highest education level: Not on file   Occupational History     Not on file   Social Needs     Financial resource strain: Not on file     Food insecurity:     Worry: Not on file     Inability: Not on file     Transportation needs:      Medical: Not on file     Non-medical: Not on file   Tobacco Use     Smoking status: Never Smoker     Smokeless tobacco: Never Used   Substance and Sexual Activity     Alcohol use: Yes     Alcohol/week: 0.0 - 0.6 oz     Drug use: No     Sexual activity: Yes     Partners: Male     Birth control/protection: None   Lifestyle     Physical activity:     Days per week: Not on file     Minutes per session: Not on file     Stress: Not on file   Relationships     Social connections:     Talks on phone: Not on file     Gets together: Not on file     Attends Episcopal service: Not on file     Active member of club or organization: Not on file     Attends meetings of clubs or organizations: Not on file     Relationship status: Not on file     Intimate partner violence:     Fear of current or ex partner: Not on file     Emotionally abused: Not on file     Physically abused: Not on file     Forced sexual activity: Not on file   Other Topics Concern      Service Not Asked     Blood Transfusions Not Asked     Caffeine Concern No     Occupational Exposure Not Asked     Hobby Hazards Not Asked     Sleep Concern No     Stress Concern No     Weight Concern No     Special Diet Not Asked     Back Care No     Exercise Yes     Bike Helmet Not Asked     Seat Belt Yes     Self-Exams Yes     Parent/sibling w/ CABG, MI or angioplasty before 65F 55M? Not Asked   Social History Narrative     Not on file     Current Outpatient Medications   Medication Sig     Prenatal MV-Min-Fe Fum-FA-DHA (PRENATAL 1 PO)      methylphenidate (RITALIN) 5 MG tablet Take 5 mg by mouth 2 times daily     tretinoin (RETIN-A) 0.025 % cream Use every night to entire face at bedtime. Skip to every night if too irritating. (Patient not taking: Reported on 8/5/2019)     zaleplon (SONATA) 10 MG capsule Take 10 mg by mouth as needed for sleep     No current facility-administered medications for this visit.      No Known  "Allergies    ============================================  MEDICAL HISTORY  Past Medical History:   Diagnosis Date     Abnormal Pap smear of cervix 2013     Past Surgical History:   Procedure Laterality Date     ARTHROSCOPIC RECONSTRUCTION ANTERIOR CRUCIATE LIGAMENT       EXTRACTION(S) DENTAL       LEEP TX, CERVICAL  2016     OB History    Para Term  AB Living   1 0 0 0 0 0   SAB TAB Ectopic Multiple Live Births   0 0 0 0 0      # Outcome Date GA Lbr Amilcar/2nd Weight Sex Delivery Anes PTL Lv   1 Current              GYN History- Hx of Abnormal Pap Smears; last pap 18 NIL, HPV neg.                        Cervical procedures: LEEP                        History of STI: HPV    I personally reviewed the past social/family/medical and surgical history on the date of service.   I reviewed lab work done at Intake visit with patient.    EXAM:  /75 (BP Location: Left arm, Patient Position: Chair)   Pulse 91   Ht 1.676 m (5' 6\")   Wt 66.7 kg (147 lb 1.6 oz)   LMP 06/10/2019   BMI 23.74 kg/m      EXAM:  GENERAL:  Pleasant pregnant female, alert, cooperative and well groomed.  SKIN:  Warm and dry, without lesions or rashes  HEAD: Symmetrical features.  MOUTH:  Buccal mucosa pink, moist without lesions.  Teeth in good repair.    NECK:  Thyroid without enlargement and nodules.  Lymph nodes not palpable.   LUNGS:  Clear to auscultation.  BREAST:    No dominant, fixed or suspicious masses are noted.  No skin or nipple changes or axillary nodes.   Nipples everted.  Well-healed scar out outer R breast.   HEART:  RRR without murmur.  ABDOMEN: Soft without masses , tenderness or organomegaly.  No CVA tenderness.  Uterus not palpable appropriate for dates.  No scars noted.  MUSCULOSKELETAL:  Full range of motion  EXTREMITIES:  No edema. No significant varicosities.   PELVIC EXAM: Deferred  WET PREP:Not done  GC/CHLAMYDIA CULTURE OBTAINED:YES    Lab Results   Component Value Date    PAP NIL " 2018      Assessment:  28 year old , 10w2d weeks of pregnancy with ELENA of Mar 16, 2020 by LMP  Genetic Screening: First Trimester Screen      ICD-10-CM    1. Twin gestation in first trimester, unspecified multiple gestation type O30.001 Gonorrhea PCR     Chlamydia PCR (Clinic Collect)     Plan:  - Reviewed use of triage nurse line and contacting the on-call provider after hours for an urgent need such as fever, vagina bleeding, bladder or vaginal infection, rupture of membranes,  or term labor.  - Reviewed best evidence for: weight gain for her weight and height for pregnancy:  Based on pre-pregnancy weight of 149 and Body mass index is 23.74 kg/m . RECOMMENDED WEIGHT GAIN: 25-35 lbs.  - Reviewed healthy diet and foods to avoid; exercise and activity during pregnancy; avoiding exposure to toxoplasmosis; and maintenance of a generally healthy lifestyle.   - Discussed the harms, benefits, side effects and alternative therapies for current prescribed and OTC medications.  - Reviewed high risk for Pre Eclampsia d/t twin pregnancy,  agreeable to starting 81mg aspirin daily after 12 weeks. Rx sent.  - All pt's and partner's questions discussed and answered.  Pt verbalized understanding of and agreement to plan of care.   - Continue scheduled prenatal care and prn if questions or concerns, RTC in 4 weeks CATHIE Kennedy CNM

## 2019-08-22 ASSESSMENT — ANXIETY QUESTIONNAIRES: GAD7 TOTAL SCORE: 2

## 2019-08-30 ENCOUNTER — PRE VISIT (OUTPATIENT)
Dept: MATERNAL FETAL MEDICINE | Facility: CLINIC | Age: 29
End: 2019-08-30

## 2019-09-04 ENCOUNTER — OFFICE VISIT (OUTPATIENT)
Dept: MATERNAL FETAL MEDICINE | Facility: CLINIC | Age: 29
End: 2019-09-04
Attending: ADVANCED PRACTICE MIDWIFE
Payer: COMMERCIAL

## 2019-09-04 ENCOUNTER — HOSPITAL ENCOUNTER (OUTPATIENT)
Dept: ULTRASOUND IMAGING | Facility: CLINIC | Age: 29
Discharge: HOME | End: 2019-09-04
Attending: ADVANCED PRACTICE MIDWIFE | Admitting: ADVANCED PRACTICE MIDWIFE
Payer: COMMERCIAL

## 2019-09-04 DIAGNOSIS — O26.90 PREGNANCY RELATED CONDITION, ANTEPARTUM: ICD-10-CM

## 2019-09-04 DIAGNOSIS — O30.049 DICHORIONIC DIAMNIOTIC TWIN PREGNANCY, ANTEPARTUM: Primary | ICD-10-CM

## 2019-09-04 DIAGNOSIS — Z36.9 FIRST TRIMESTER SCREENING: Primary | ICD-10-CM

## 2019-09-04 DIAGNOSIS — Z36.9 FIRST TRIMESTER SCREENING: ICD-10-CM

## 2019-09-04 PROCEDURE — 84163 PAPPA SERUM: CPT | Performed by: OBSTETRICS & GYNECOLOGY

## 2019-09-04 PROCEDURE — 84704 HCG FREE BETACHAIN TEST: CPT | Performed by: OBSTETRICS & GYNECOLOGY

## 2019-09-04 PROCEDURE — 36415 COLL VENOUS BLD VENIPUNCTURE: CPT | Performed by: OBSTETRICS & GYNECOLOGY

## 2019-09-04 PROCEDURE — 76801 OB US < 14 WKS SINGLE FETUS: CPT

## 2019-09-04 PROCEDURE — 82105 ALPHA-FETOPROTEIN SERUM: CPT | Performed by: OBSTETRICS & GYNECOLOGY

## 2019-09-04 PROCEDURE — 96040 ZZH GENETIC COUNSELING, EACH 30 MINUTES: CPT | Mod: ZF | Performed by: GENETIC COUNSELOR, MS

## 2019-09-04 PROCEDURE — 40000954 ZZHCL STATISTIC COUNSYL FAMILY PREP: Performed by: OBSTETRICS & GYNECOLOGY

## 2019-09-04 NOTE — PROGRESS NOTES
Please refer to ultrasound report under 'Imaging' Studies of 'Chart Review' tabs.    Isaiah Dinero M.D.

## 2019-09-04 NOTE — PROGRESS NOTES
"[facility] Maternal Fetal Medicine [place]  Genetic Counseling Consult    Patient: Estelle Escobar YOB: 1990   Date of Service: 9/04/19      Estelle Escobar was seen at [facility] Fetal Medicine [place] for genetic consultation to discuss the options for screening and testing for fetal chromosome abnormalities. She was unaccompanied to today's visit. Estelle is pregnant with di/di twins.       Impression/Plan:   1.  Estelle had an ultrasound and blood draw for first trimester screening.  Due to di/di twin pregnancy, limitations to prenatal screening were discussed. Results are expected within 5-7 days, and will be available in EPIC.  We will contact her to discuss the results, and a copy will be forwarded to the office of the referring OB provider. She requested that a detailed voice message be left at [phone] if she is unavailable.    2.  Estelle pursued Foresight carrier screening via [employer] today. She will be informed of these results within 2-3 weeks. A detailed voice message can be left at the phone number listed above.    3.  Estelle is already scheduled for a comprehensive ultrasound on 10/21 due to her twin pregnancy. After thorough discussion of prenatal screening and testing options, Estelle wishes to schedule an amniocentesis procedure. Her and her partner have completed their own research with regards to screening and testing and agree that they desire diagnostic testing in pregnancy. Estelle reports that they \"do not want to be surprised\" and feel as though diagnostic testing will get them the most comprehensive information. We briefly reviewed the testing that can be performed on an amniocentesis as well as the limitations of this testing. There is no prenatal test that can rule out all genetic conditions. There is also no prenatal test that will definitively determine if an individual will have any developmental or cognitive delays. Estelle has educated herself well " and feels strongly that they desire the most amount of information possible in this pregnancy.    We reviewed the option of diagnostic testing via [facility] between now and the end of the 13th week gestation or amniocentesis at a later gestational age. She declined the option of [facility] today and desires an amniocentesis in the future. An early anatomy scan and amniocentesis was offered around 16 weeks gestation, however, she desires amniocentesis to be completed at the time of her comprehensive ultrasound. Per her request, she has been scheduled for a genetics consult to complete amniocentesis consenting, a comprehensive ultrasound on her twins, and an amniocentesis procedure on 10/21. She was given procedural codes to contact her insurance with regards to coverage for an elective amniocentesis.     Pregnancy History:   /Parity:    Age at Delivery: 29 year old  ELENA: 3/16/2020, Date entered prior to episode creation  Gestational Age: 12w2d    No significant complications or exposures were reported in the current pregnancy.    Estelle is pregnant with a diamniotic, dichorionic twin pregnancy.    Medical History:   Estelle s reported medical history is not expected to impact pregnancy management or risks to fetal development.       Family History:   A three-generation pedigree was obtained, and is scanned under the  Media  tab.   The following significant findings were reported by Estelle:    Estelle has a paternal first cousin with achondroplasia.     Estelle reports a maternal family history of early menopause. Her mother began experiencing menopause at age 33, her maternal aunt in her mid 30's, and her maternal grandmother in her mid 30's. This reported history of premature ovarian insufficiency (POI) was discussed with Estelle today. POI has been linked to several causes including chromosomal defects (such as mosaic Miller's syndrome and Fragile X syndrome), toxins (such as chemotherapy), autoimmune disease, and unknown  "factors.    Fragile X syndrome  Fragile X syndrome is an X-linked genetic condition caused by a repeat expansion in the FMR1 gene. The FMR1 gene is located on the X chromosome, and so females generally inherit two copies of this gene, while males generally inherit one copy of the FMR1 gene.  The genetics related to FMR1 and Fragile X syndrome are complicated, in that varying changes in this gene are associated with variable symptoms/conditions. FMR1 genes that have 5-44 CGG repeats are considered to be in the \"normal\" range and are not generally associated with any of the FMR1-related conditions. FMR1 genes that have  CGG repeats are considered to be in the \"premutation\" range.  Individuals (males and females) who carry a gene with a repeat number in this range are at increased risk for later-onset Fragile X-associated tremor/ataxia (\"FXTAS\").  Females who carry a premutation are also thought to be at increased risk for FMR1-related primary ovarian insuffiencey (POI). FMR1 genes that have more than 200 CGG repeats are considered to be in the \"full mutation\" range.  Males who inherit a full mutation have Fragile X syndrome, which is associated with characteristic physical features, cognitive impairment, and various behavior differences.  Females who inherit a full mutation can also sometimes have Fragile X syndrome associated with mental cognitive impairment/intellectual disability, as well as other features of Fragile X syndrome.    After this discussion, Estelle opted to proceed with carrier screening for Fragile X syndrome as well as many other recessive genetic conditions.    Otherwise, the reported family history is negative for multiple miscarriages, stillbirths, birth defects, mental retardation, known genetic conditions, and consanguinity.       Carrier Screening:   The patient reports that she and the father of the pregnancy have  ancestry:      Cystic fibrosis is an autosomal recessive genetic " condition that occurs with increased frequency in individuals of  ancestry and carrier screening for this condition is available.  In addition,  screening in the [facility] includes cystic fibrosis.      Expanded carrier screening for mutations in a large panel of genes associated with autosomal recessive conditions including cystic fibrosis, spinal muscular atrophy, and others, is now available.      The patient elected to pursue carrier screening today (kit #[id number]).  Her blood was drawn for Foresight carrier screen and sent to [facility] laboratory.  We will contact her when these test results become available, and a copy of these results will be relayed to her primary OB provider.       Risk Assessment for Chromosome Conditions:   We explained that the risk for fetal chromosome abnormalities increases with maternal age. We discussed specific features of common chromosome abnormalities, including Down syndrome, trisomy 13, trisomy 18, and sex chromosome trisomies.      - At age 29 at midtrimester, the risk to have a baby with Down syndrome is 1 in 760.     - At age 29 at midtrimester, the risk to have a baby with any chromosome abnormality is 1 in 380.     These estimates are based on a pinto pregnancy. Estelle is pregnant with a di/di pregnancy, therefore, the risk of either baby having a chromosome abnormality is higher than these listed estimates.        Testing Options:   We discussed the following options:   First trimester screening    First trimester ultrasound with nuchal translucency and nasal bone assessments, maternal plasma hCG, ROLANDO-A, and AFP measurement    Screens for fetal trisomy 21, trisomy 13, and trisomy 18    Cannot screen for open neural tube defects; maternal serum AFP after 15 weeks is recommended     Non-invasive Prenatal Testing (NIPT)    Maternal plasma cell-free DNA testing; first trimester ultrasound with nuchal translucency and nasal bone assessment is  recommended, when appropriate    Screens for fetal trisomy 21, trisomy 13, trisomy 18, and sex chromosome aneuploidy    Cannot screen for open neural tube defects; maternal serum AFP after 15 weeks is recommended     Chorionic villus sampling (CVS)    Invasive procedure typically performed in the first trimester by which placental villi are obtained for the purpose of chromosome analysis and/or other prenatal genetic analysis    Diagnostic results; >99% sensitivity for fetal chromosome abnormalities    Cannot test for open neural tube defects; maternal serum AFP after 15 weeks is recommended     Genetic Amniocentesis    Invasive procedure typically performed in the second trimester by which amniotic fluid is obtained for the purpose of chromosome analysis and/or other prenatal genetic analysis    Diagnostic results; >99% sensitivity for fetal chromosome abnormalities    AFAFP measurement tests for open neural tube defects     Comprehensive (Level II) ultrasound: Detailed ultrasound performed between 18-22 weeks gestation to screen for major birth defects and markers for aneuploidy.        We reviewed the benefits and limitations of this testing.  Screening tests provide a risk assessment specific to the pregnancy for certain fetal chromosome abnormalities, but cannot definitively diagnose or exclude a fetal chromosome abnormality.  Follow-up genetic counseling and consideration of diagnostic testing is recommended with any abnormal screening result.     Diagnostic tests carry inherent risks- including risk of miscarriage- that require careful consideration.  These tests can detect fetal chromosome abnormalities with greater than 99% certainty.  Results can be compromised by maternal cell contamination or mosaicism, and are limited by the resolution of cytogenetic G-banding technology.  There is no screening nor diagnostic test that can detect all forms of birth defects or mental disability.     It was a pleasure to  be involved with Estelle [facility]. Face-to-face time of the meeting was 55 minutes.    Amarilis Aldridge MS, [facility]  Maternal Fetal Medicine  [facility]  Ph: [phone]  [email or web address]

## 2019-09-09 ENCOUNTER — TELEPHONE (OUTPATIENT)
Dept: MATERNAL FETAL MEDICINE | Facility: CLINIC | Age: 29
End: 2019-09-09

## 2019-09-09 NOTE — TELEPHONE ENCOUNTER
I left a message for Estelle today regarding her normal first trimester screen results for her di/di twins. Specifically, the chance either baby in this pregnancy has Down syndrome was reduced from her age related risk of 1 in 731 to less than 1 in 10,000. Similarly, the chance this pregnancy has trisomy 18/trisomy 13 was reduced from her age related risk of 1 in 1,332 to less than 1 in 10,000. This result significantly reduces the chance this pregnancy has Down syndrome, trisomy 18, or trisomy 13.     These results are very reassuring, but there remains a small chance for a false positive or false negative result. This screen also does not address all chromosome abnormalities or all causes of birth defects and cognitive delay. As such, Estelle will still have the option of the Innatal screen and/or diagnostic testing (CVS or amniocentesis) if she is interested or there is an indication later in the pregnancy. Estelle is already scheduled for an elective amniocentesis procedure on 10/21 along with her comprehensive ultrasound.     She also has the option of having a maternal serum AFP blood draw after 15 weeks to screen for ONTD; she is encouraged to discuss this option with her primary OB provider.      I encouraged her to contact me if she has any questions in the future. A copy of this note and her first trimester screen results will be forwarded to her primary OB provider.    Amarilis Aldridge MS, [facility]  Maternal Fetal Medicine  [facility]  Ph: [phone]  [email or web address]

## 2019-09-11 LAB
LAB SCANNED RESULT: NORMAL
LAB SCANNED RESULT: NORMAL

## 2019-09-13 ENCOUNTER — TELEPHONE (OUTPATIENT)
Dept: MATERNAL FETAL MEDICINE | Facility: CLINIC | Age: 29
End: 2019-09-13

## 2019-09-13 NOTE — TELEPHONE ENCOUNTER
I left a message for Estelle today to discuss her Foresight Carrier Screen results via [employer].     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 Without knowing sEtelle's partner's carrier status, they have a chance of having a child with Almeida syndrome. Their chance of having an affected child prior to this screening was less than 1 in 1,000,000. This chance is now 1 in 2,000.     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 The degree of hearing loss is difficult to predict, however, is not progressive. Without knowing Estelle's partner's carrier status, their chance of having an affected child prior to this screening was 1 in 2,600. This chance is now 1 in 100.     Estelle was not found to be a carrier for the remaining 175 conditions for which she was screened.     I encouraged Estelle to contact me with regards to these results. If they desire, her partner can complete carrier screening to determine more specific reproductive risks. Estelle is currently scheduled for an amniocentesis procedure on 10/21. If Estelle and her partner are found to be carriers for the same condition, prenatal diagnostic testing may be available.     Amarilis Aldridge MS, [facility]  Maternal Fetal Medicine  [facility]  Ph: [phone]  [email or web address]

## 2019-09-14 PROBLEM — Z84.81 FAMILY HISTORY OF CARRIER OF GENETIC DISEASE: Status: ACTIVE | Noted: 2019-09-14

## 2019-09-17 ENCOUNTER — OFFICE VISIT (OUTPATIENT)
Dept: OBGYN | Facility: CLINIC | Age: 29
End: 2019-09-17
Attending: OBSTETRICS & GYNECOLOGY
Payer: COMMERCIAL

## 2019-09-17 VITALS
HEIGHT: 66 IN | DIASTOLIC BLOOD PRESSURE: 76 MMHG | HEART RATE: 82 BPM | SYSTOLIC BLOOD PRESSURE: 114 MMHG | WEIGHT: 153.5 LBS | BODY MASS INDEX: 24.67 KG/M2

## 2019-09-17 DIAGNOSIS — O30.042 DICHORIONIC DIAMNIOTIC TWIN PREGNANCY IN SECOND TRIMESTER: ICD-10-CM

## 2019-09-17 DIAGNOSIS — O09.92 HIGH-RISK PREGNANCY IN SECOND TRIMESTER: Primary | ICD-10-CM

## 2019-09-17 PROCEDURE — 87591 N.GONORRHOEAE DNA AMP PROB: CPT | Performed by: OBSTETRICS & GYNECOLOGY

## 2019-09-17 PROCEDURE — G0463 HOSPITAL OUTPT CLINIC VISIT: HCPCS | Mod: ZF

## 2019-09-17 PROCEDURE — 87491 CHLMYD TRACH DNA AMP PROBE: CPT | Performed by: OBSTETRICS & GYNECOLOGY

## 2019-09-17 ASSESSMENT — MIFFLIN-ST. JEOR: SCORE: 1443.02

## 2019-09-17 NOTE — NURSING NOTE
Chief Complaint   Patient presents with     Prenatal Care     14w1d       See KENNETH Lin 9/17/2019

## 2019-09-17 NOTE — PROGRESS NOTES
"OB Visit Progress Note    Estelle Escobar  1990    SUBJECTIVE:  Estelle Escobar is a 28 year old   at 14w1d by LMP consistent w/ US<14 wks (at 10w2d) who presents to the clinic for a routine OB visit for di-di twin pregnancy. She is concerned about sudden hair loss and increasing sciatica pain. Feels like hair is breaking more- does bleach hair often. Sciatica pain is stable. No bleeding since LMP. Has history of LEEP x 1. Cervix 4cm on dating US.       /76 (BP Location: Left arm, Patient Position: Chair)   Pulse 82   Ht 1.676 m (5' 6\")   Wt 69.6 kg (153 lb 8 oz)   LMP 06/10/2019   Breastfeeding? No   BMI 24.78 kg/m      =========================================    ASSESSMENT/PLAN  Estelle Escobar is a 28 year old  at 14w1d by LMP consistent w/ 10w2d US who presents to the clinic for a routine OB visit for di-di twin pregnancy.     - Cl/GC UA collected this visit    - Instructed on best evidence for: exercise and activity during pregnancy and maintenance of a generally healthy lifestyle.   - Addressed and reassured her concerns of her twin pregnancy, recent hair loss, and sciatica pain. Will continue to monitor.    - Plans primary CS    Manuela Tyson MS acting as a scribe for Dr. Gama  I, Dr. Gama, personally performed the services described in this documentation, as scribed by Manuela Tyson in my presence, and it is both accurate and complete.   Alina Gama MD       "

## 2019-09-17 NOTE — LETTER
"2019       RE: Estelle Escobar  [address] [address]     Dear Colleague,    Thank you for referring your patient, Estelle Escobar, to the [facility]. Please see a copy of my visit note below.    OB Visit Progress Note    Estelle Escobar  1990    SUBJECTIVE:  Estelle Escobar is a 28 year old   at 14w1d by LMP consistent w/ US<14 wks (at 10w2d) who presents to the clinic for a routine OB visit for di-di twin pregnancy. She is concerned about sudden hair loss and increasing sciatica pain. Feels like hair is breaking more- does bleach hair often. Sciatica pain is stable. No bleeding since LMP. Has history of LEEP x 1. Cervix 4cm on dating US.     /76 (BP Location: Left arm, Patient Position: Chair)   Pulse 82   Ht 1.676 m (5' 6\")   Wt 69.6 kg (153 lb 8 oz)   LMP 06/10/2019   Breastfeeding? No   BMI 24.78 kg/m       =========================================    ASSESSMENT/PLAN  Estelle Escobar is a 28 year old  at 14w1d by LMP consistent w/ 10w2d US who presents to the clinic for a routine OB visit for di-di twin pregnancy.   - Cl/GC UA collected this visit    - Instructed on best evidence for: exercise and activity during pregnancy and maintenance of a generally healthy lifestyle.   - Addressed and reassured her concerns of her twin pregnancy, recent hair loss, and sciatica pain. Will continue to monitor.    - Plans primary CS    Manuela Tyson MS acting as a scribe for Dr. Gama  I, Dr. Gama, personally performed the services described in this documentation, as scribed by Manuela Tyson in my presence, and it is both accurate and complete.     Alina Gama MD         "

## 2019-09-18 LAB
C TRACH DNA SPEC QL NAA+PROBE: NEGATIVE
N GONORRHOEA DNA SPEC QL NAA+PROBE: NEGATIVE
SPECIMEN SOURCE: NORMAL
SPECIMEN SOURCE: NORMAL

## 2019-09-19 PROBLEM — O09.92 HIGH-RISK PREGNANCY IN SECOND TRIMESTER: Status: ACTIVE | Noted: 2019-09-19

## 2019-09-19 PROBLEM — O30.042 DICHORIONIC DIAMNIOTIC TWIN PREGNANCY IN SECOND TRIMESTER: Status: ACTIVE | Noted: 2019-08-04

## 2019-09-23 LAB — LAB SCANNED RESULT: NORMAL

## 2019-10-01 ENCOUNTER — TELEPHONE (OUTPATIENT)
Dept: MATERNAL FETAL MEDICINE | Facility: CLINIC | Age: 29
End: 2019-10-01

## 2019-10-01 ENCOUNTER — TELEPHONE (OUTPATIENT)
Dept: OBGYN | Facility: CLINIC | Age: 29
End: 2019-10-01

## 2019-10-01 DIAGNOSIS — M54.42 ACUTE BILATERAL LOW BACK PAIN WITH LEFT-SIDED SCIATICA: ICD-10-CM

## 2019-10-01 DIAGNOSIS — M54.30 SCIATICA: Primary | ICD-10-CM

## 2019-10-01 NOTE — TELEPHONE ENCOUNTER
Estelle was originally seen in [facility] on 9/4 and completed first trimester screening and Universal carrier screening via [employer]. Her first trimester screen results for her twins were normal, indicating a less than 1 in 10,000 chance for Down syndrome, trisomy 18, or trisomy 13 in this pregnancy. 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    Earlier this pregnancy, Estelle was very interested in pursuing diagnostic testing with amniocentesis at the time of her comprehensive ultrasound. In review of her completed screening results and in further discussion with her partner, Derek, and their own research, they are considering not pursuing the amniocentesis. At Estelle's request, an order was placed for Derek to come to [facility] to complete consenting paperwork and blood draw for carrier screening. They are currently leaning towards carrier screening only for Almeida syndrome and GJB2 related deafness but are still considering Universal carrier screening for Derek as well.     If Derek is found to be a carrier for Almeida syndrome, Estelle feels confident that they will desire amniocentesis to determine if either baby is affected. If Derek is not found to be a carrier for Almeida syndrome, Estelle thinks they may decline amniocentesis and proceed with the Modified Sequential Screen.     Estelle had many great questions and all were answered to her apparent satisfaction. Her and Derek are still considering all of their screening and testing options, therefore her currently scheduled appointment at [facility] on 10/21 will not be adjusted at this time.     Plan: Derek will pursue carrier screening in the near future. Next steps will be discussed once these results are available.    Amarilis Aldridge MS, [facility]  Maternal Fetal Medicine  [facility]  Ph: [phone]  [email or web address]

## 2019-10-01 NOTE — TELEPHONE ENCOUNTER
Spoke to Estelle regarding increase in sciatic pain since last visit. States she has been noting tailbone pain radiating to glute/hip at a 3-4/10 since prior to last visit with Dr. Gama. States pain is now more intense at 7/10 and notes relieved with standing. Worse with twisting, bending, and other movements.    Patient states this was discussed at her lat visit and option of PT was given if worsened pain.     Routing to Dr. Gama for approval of PT referral for Estelle.

## 2019-10-07 ENCOUNTER — THERAPY VISIT (OUTPATIENT)
Dept: PHYSICAL THERAPY | Facility: CLINIC | Age: 29
End: 2019-10-07
Payer: COMMERCIAL

## 2019-10-07 DIAGNOSIS — M54.42 ACUTE BILATERAL LOW BACK PAIN WITH LEFT-SIDED SCIATICA: ICD-10-CM

## 2019-10-07 DIAGNOSIS — M54.9 BACK PAIN: ICD-10-CM

## 2019-10-07 PROCEDURE — 97110 THERAPEUTIC EXERCISES: CPT | Mod: GP | Performed by: PHYSICAL THERAPIST

## 2019-10-07 PROCEDURE — 97161 PT EVAL LOW COMPLEX 20 MIN: CPT | Mod: GP | Performed by: PHYSICAL THERAPIST

## 2019-10-07 NOTE — PROGRESS NOTES
[facility] Initial Evaluation  Subjective:  The history is provided by the patient. No  was used.   Type of problem:  Lumbar       Problem details: Is pregnant, approx. 4 months, with twins.  Has had ACL reconstruction, both non contact.  Currently has central spine pain off to the right that began 3 weeks ago.  Drove to [place] and this irritated her back.   Currently rides a bike and walks her dogs and lying prone help.  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  Also works as a  as well but has not done this since she hurt her back.  Has tried some stretches on her own.  .                             Objective:  System         Lumbar/SI Evaluation  ROM:    AROM Lumbar:   Flexion:        Hands to knees with pain  Ext:                    Relief of pain   Side Bend:        Left:     Right:   Rotation:           Left:     Right:   Side Glide:        Left:     Right:           Lumbar Myotomes:  normal            Lumbar DTR's:  not assessed      Cord Signs:  not assessed    Lumbar Dermtomes:  normal                Neural Tension/Mobility:  Lumbar:  Normal        Lumbar Palpation:  Palpation (lumbar): ANH on the right and left.  Tenderness present at Left:    Piriformis  Tenderness present at Right: Piriformis  Functional Tests:  Core strength and proprioception lumbar: pain on the right with standing on right leg and with weight bearing on right in all 4's.        Lumbar Provocation:  not assessed        SI joint/Sacrum:            Sacral conclusion left:  Sacral torsion                                               General     ROS    Assessment/Plan:    Patient is a 29 year old female with lumbar complaints.    Patient has the following significant findings with corresponding treatment plan.                Diagnosis 1:  Back pain  Pain -  manual therapy, self management, education and home program  Decreased ROM/flexibility - manual therapy, therapeutic exercise and home  program  Decreased strength - therapeutic exercise, therapeutic activities and home program  Decreased function - therapeutic activities and home program    Therapy Evaluation Codes:   1) History comprised of:   Personal factors that impact the plan of care:      None.    Comorbidity factors that impact the plan of care are:      Current pregnancy.     Medications impacting care: None.  2) Examination of Body Systems comprised of:   Body structures and functions that impact the plan of care:      Pelvis.   Activity limitations that impact the plan of care are:      Bending, Lifting, Running, Sitting, Sports, Squatting/kneeling, Working and Sleeping.  3) Clinical presentation characteristics are:   Stable/Uncomplicated.  4) Decision-Making    Low complexity using standardized patient assessment instrument and/or measureable assessment of functional outcome.  Cumulative Therapy Evaluation is: Low complexity.    Previous and current functional limitations:  (See Goal Flow Sheet for this information)    Short term and Long term goals: (See Goal Flow Sheet for this information)     Communication ability:  Patient appears to be able to clearly communicate and understand verbal and written communication and follow directions correctly.  Treatment Explanation - The following has been discussed with the patient:   RX ordered/plan of care  Anticipated outcomes  Possible risks and side effects  This patient would benefit from PT intervention to resume normal activities.   Rehab potential is good.    Frequency:  1 X week, once daily  Duration:  for 4 weeks  Discharge Plan:  Independent in home treatment program.  Reach maximal therapeutic benefit.    Please refer to the daily flowsheet for treatment today, total treatment time and time spent performing 1:1 timed codes.

## 2019-10-08 NOTE — PROGRESS NOTES
[facility] Initial Evaluation  Subjective:      General health as reported by patient is excellent. Pertinent medical history includes:  History of fractures and currently pregnant. Other medical history details: history of stress fractures, and plantar fasciatissp?.    Surgeries include:  Orthopedic surgery. Other surgery history details: 2 ACL surgeries and 1 knee.     Primary job tasks include:  Computer work, lifting/carrying, prolonged standing and pushing/pulling.           Patient is seamstress and .   Barriers include:  None as reported by patient.  Red flags:  None as reported by patient.    Oswestry Score: 32 %                 Objective:  System    Physical Exam    General     ROS    Assessment/Plan:

## 2019-10-15 ENCOUNTER — OFFICE VISIT (OUTPATIENT)
Dept: OBGYN | Facility: CLINIC | Age: 29
End: 2019-10-15
Attending: OBSTETRICS & GYNECOLOGY
Payer: COMMERCIAL

## 2019-10-15 VITALS
DIASTOLIC BLOOD PRESSURE: 68 MMHG | WEIGHT: 165.1 LBS | SYSTOLIC BLOOD PRESSURE: 101 MMHG | HEIGHT: 66 IN | HEART RATE: 89 BPM | BODY MASS INDEX: 26.53 KG/M2

## 2019-10-15 DIAGNOSIS — Z23 NEED FOR PROPHYLACTIC VACCINATION AND INOCULATION AGAINST INFLUENZA: ICD-10-CM

## 2019-10-15 DIAGNOSIS — O09.92 HIGH-RISK PREGNANCY IN SECOND TRIMESTER: Primary | ICD-10-CM

## 2019-10-15 DIAGNOSIS — O30.042 DICHORIONIC DIAMNIOTIC TWIN PREGNANCY IN SECOND TRIMESTER: ICD-10-CM

## 2019-10-15 PROCEDURE — G0463 HOSPITAL OUTPT CLINIC VISIT: HCPCS | Mod: ZF

## 2019-10-15 PROCEDURE — 90686 IIV4 VACC NO PRSV 0.5 ML IM: CPT | Mod: ZF

## 2019-10-15 PROCEDURE — 25000128 H RX IP 250 OP 636: Mod: ZF

## 2019-10-15 PROCEDURE — G0008 ADMIN INFLUENZA VIRUS VAC: HCPCS | Mod: ZF

## 2019-10-15 ASSESSMENT — MIFFLIN-ST. JEOR: SCORE: 1490.64

## 2019-10-15 NOTE — PROGRESS NOTES
"SUBJECTIVE   Estelle Escobar is a 28 year old   at 18w1d by LMP consistent w/ US [id number] who presents to the clinic for a routine OB visit for di-di twin pregnancy. Is doing well with exception of some lower back pain for which she has started seeing PT. PT is helping some and she feels it is improving. Denies contractions, loss of fluid, vaginal bleeding, headache, blurry vision, chest pain, shortness of breath, abdominal pain, swelling, dysuria, nausea/vomiting. Has questions about safety of laying on her back and sexual activity. Hx of LEEP x1      OBJECTIVE   /68 (BP Location: Left arm, Patient Position: Chair)   Pulse 89   Ht 1.676 m (5' 6\")   Wt 74.9 kg (165 lb 1.6 oz)   LMP 06/10/2019   Breastfeeding? No   BMI 26.65 kg/m        ASSESSMENT & PLAN   29 year old  at 18w1d by LMP consistent w/ US<14 wks (at 10w2d) who presents for routine OB visit for di-di twin pregnancy.    -Addressed and reassured her concerns about sexual activity and sleeping position.   -Back pain improving- will continue to monitor  -Plans primary CS at 38 wks  -Patient potentially plans to get amnio next week but still deciding. Provided support for either decision given normal 1st trimester screening.  - Flu shot today    RTC 4 weeks      Simi Mcconnell, MS3 acting as a scribe for Dr. Gama  I, Dr. Gama, personally performed the services described in this documentation, as scribed by Simi Mcconnell in my presence, and it is both accurate and complete.   Alina Gama MD               "

## 2019-10-15 NOTE — NURSING NOTE
Chief Complaint   Patient presents with     Prenatal Care     18w1d       See KENNETH Lin 10/15/2019    Clinic Administered Medication Documentation    MEDICATION LIST:   Injectable Medication Documentation    Patient was given Fluzone. Prior to medication administration, verified patients identity using patient s name and date of birth. Please see MAR and medication order for additional information. Patient instructed to remain in clinic for 15 minutes.      Was entire vial of medication used? Yes  Vial/Syringe: Syringe  Expiration Date:  6/30/20  Was this medication supplied by the patient? No

## 2019-10-15 NOTE — LETTER
"10/15/2019       RE: Estelle Escobar  [address] [address]     Dear Colleague,    Thank you for referring your patient, Estelle Escobar, to the [facility] at [facility]. Please see a copy of my visit note below.    SUBJECTIVE   Estelle Escobar is a 28 year old   at  18w1d by LMP consistent w/ US [id number] who presents to the clinic for a routine OB visit for di-di twin pregnancy. Is doing well with exception of some lower back pain for which she has started seeing PT. PT is helping some and she feels it is improving. Denies contractions, loss of fluid, vaginal bleeding, headache, blurry vision, chest pain, shortness of breath, abdominal pain, swelling, dysuria, nausea/vomiting. Has questions about safety of laying on her back and sexual activity. Hx of LEEP x1      OBJECTIVE   /68 (BP Location: Left arm, Patient Position: Chair)   Pulse 89   Ht 1.676 m (5' 6\")   Wt 74.9 kg (165 lb 1.6 oz)   LMP 06/10/2019   Breastfeeding? No   BMI 26.65 kg/m         ASSESSMENT & PLAN   29 year old  at 18w1d by LMP consistent w/ US<14 wks (at 10w2d) who presents for routine OB visit for di-di twin pregnancy.    -Addressed and reassured her concerns about sexual activity and sleeping position.   -Back pain improving- will continue to monitor  -Plans primary CS at 38 wks  -Patient potentially plans to get amnio next week but still deciding. Provided support for either decision given normal 1st trimester screening.  - Flu shot today    RTC 4 weeks      Simi Mcconnell, MS3 acting as a scribe for Dr. Gama  I, Dr. Gama, personally performed the services described in this documentation, as scribed by Simi Mcconnell in my presence, and it is both accurate and complete.       Alina Gama MD               "

## 2019-10-16 ENCOUNTER — THERAPY VISIT (OUTPATIENT)
Dept: PHYSICAL THERAPY | Facility: CLINIC | Age: 29
End: 2019-10-16
Payer: COMMERCIAL

## 2019-10-16 DIAGNOSIS — M54.9 BACK PAIN: ICD-10-CM

## 2019-10-16 PROCEDURE — 97110 THERAPEUTIC EXERCISES: CPT | Mod: GP | Performed by: PHYSICAL THERAPIST

## 2019-10-18 ENCOUNTER — TELEPHONE (OUTPATIENT)
Dept: MATERNAL FETAL MEDICINE | Facility: CLINIC | Age: 29
End: 2019-10-18

## 2019-10-18 NOTE — TELEPHONE ENCOUNTER
I called Estelle to check in prior to her comprehensive ultrasound and possible amniocentesis procedure scheduled for Monday 10/21. They were awaiting Derek's carrier screening results at the time of our last discussion and were planning on using his results in their decision making for an amniocentesis. Derek and Estelle have discussed the low reproductive risk for the recessive conditions for which they were screened. Estelle desires to complete the comprehensive ultrasound on Monday prior to deciding if they will pursue an amniocentesis. I assured her that the procedure will remain an option to her at any point if they decide to pursue it.     Amarilis Aldridge MS, [facility]  Maternal Fetal Medicine  [facility]  Ph: [phone]  [email or web address]

## 2019-10-21 ENCOUNTER — HOSPITAL ENCOUNTER (OUTPATIENT)
Dept: ULTRASOUND IMAGING | Facility: CLINIC | Age: 29
Discharge: HOME | End: 2019-10-21
Attending: OBSTETRICS & GYNECOLOGY | Admitting: OBSTETRICS & GYNECOLOGY
Payer: COMMERCIAL

## 2019-10-21 ENCOUNTER — OFFICE VISIT (OUTPATIENT)
Dept: MATERNAL FETAL MEDICINE | Facility: CLINIC | Age: 29
End: 2019-10-21
Attending: OBSTETRICS & GYNECOLOGY
Payer: COMMERCIAL

## 2019-10-21 DIAGNOSIS — O30.049 DICHORIONIC DIAMNIOTIC TWIN PREGNANCY, ANTEPARTUM: ICD-10-CM

## 2019-10-21 DIAGNOSIS — O30.049 DICHORIONIC DIAMNIOTIC TWIN PREGNANCY, ANTEPARTUM: Primary | ICD-10-CM

## 2019-10-21 PROCEDURE — 76812 OB US DETAILED ADDL FETUS: CPT

## 2019-10-21 NOTE — PROGRESS NOTES
"Please see \"Imaging\" tab under \"Chart Review\" for details of today's US.    Griselda Colon, DO    "

## 2019-10-23 ENCOUNTER — THERAPY VISIT (OUTPATIENT)
Dept: PHYSICAL THERAPY | Facility: CLINIC | Age: 29
End: 2019-10-23
Payer: COMMERCIAL

## 2019-10-23 DIAGNOSIS — M54.9 BACK PAIN: ICD-10-CM

## 2019-10-23 PROCEDURE — 97110 THERAPEUTIC EXERCISES: CPT | Mod: GP | Performed by: PHYSICAL THERAPIST

## 2019-10-23 NOTE — PROGRESS NOTES
Subjective:  HPI                    Objective:  System    Physical Exam    General     ROS    Assessment/Plan:    DISCHARGE REPORT    Progress reporting period is from 10/7/2019 to 10/23/2019.       SUBJECTIVE  Subjective changes noted by patient:  .  Subjective: Walking and cycling is going well.  Tried TRX and this went well.  Has light weights.up to #12.      Current pain level is 0/10  .     Previous pain level was  NA  .   Changes in function:  Yes (See Goal flowsheet attached for changes in current functional level)  Adverse reaction to treatment or activity: None    OBJECTIVE  Changes noted in objective findings:  Yes,   Objective: No pain today with forward bend.  Still has pain with lying supine so avoid this for now.  Discussed care after  delivery since patient will have a planned .       ASSESSMENT/PLAN  Updated problem list and treatment plan: Diagnosis 1:  Back pain  Pain -  manual therapy, self management, education and home program  Decreased ROM/flexibility - manual therapy, therapeutic exercise and home program  Decreased strength - therapeutic exercise, therapeutic activities and home program  Decreased function - therapeutic activities and home program  STG/LTGs have been met or progress has been made towards goals:  Yes (See Goal flow sheet completed today.)  Assessment of Progress: The patient's condition is improving.  The patient's condition has potential to improve.  Self Management Plans:  Patient has been instructed in a home treatment program.    Estelle continues to require the following intervention to meet STG and LTG's:  Patient needs to continue to work on the home exercise program.      Recommendations:  This patient is ready to be discharged from therapy and continue their home treatment program.    Please refer to the daily flowsheet for treatment today, total treatment time and time spent performing 1:1 timed codes.

## 2019-11-12 ENCOUNTER — HOSPITAL ENCOUNTER (OUTPATIENT)
Dept: ULTRASOUND IMAGING | Facility: CLINIC | Age: 29
Discharge: HOME | End: 2019-11-12
Attending: OBSTETRICS & GYNECOLOGY | Admitting: OBSTETRICS & GYNECOLOGY
Payer: COMMERCIAL

## 2019-11-12 ENCOUNTER — OFFICE VISIT (OUTPATIENT)
Dept: MATERNAL FETAL MEDICINE | Facility: CLINIC | Age: 29
End: 2019-11-12
Attending: OBSTETRICS & GYNECOLOGY
Payer: COMMERCIAL

## 2019-11-12 ENCOUNTER — OFFICE VISIT (OUTPATIENT)
Dept: OBGYN | Facility: CLINIC | Age: 29
End: 2019-11-12
Attending: OBSTETRICS & GYNECOLOGY
Payer: COMMERCIAL

## 2019-11-12 ENCOUNTER — PREP FOR PROCEDURE (OUTPATIENT)
Dept: OBGYN | Facility: CLINIC | Age: 29
End: 2019-11-12

## 2019-11-12 VITALS
SYSTOLIC BLOOD PRESSURE: 119 MMHG | DIASTOLIC BLOOD PRESSURE: 70 MMHG | HEART RATE: 89 BPM | HEIGHT: 66 IN | BODY MASS INDEX: 28.08 KG/M2 | WEIGHT: 174.7 LBS

## 2019-11-12 DIAGNOSIS — O30.042 DICHORIONIC DIAMNIOTIC TWIN PREGNANCY IN SECOND TRIMESTER: ICD-10-CM

## 2019-11-12 DIAGNOSIS — O30.049 DICHORIONIC DIAMNIOTIC TWIN PREGNANCY, ANTEPARTUM: ICD-10-CM

## 2019-11-12 DIAGNOSIS — O09.92 HIGH-RISK PREGNANCY IN SECOND TRIMESTER: Primary | ICD-10-CM

## 2019-11-12 DIAGNOSIS — O30.049 DICHORIONIC DIAMNIOTIC TWIN PREGNANCY, ANTEPARTUM: Primary | ICD-10-CM

## 2019-11-12 PROCEDURE — 76816 OB US FOLLOW-UP PER FETUS: CPT | Mod: 59

## 2019-11-12 PROCEDURE — G0463 HOSPITAL OUTPT CLINIC VISIT: HCPCS | Mod: ZF

## 2019-11-12 RX ORDER — CEFAZOLIN SODIUM 2 G/100ML
2 INJECTION, SOLUTION INTRAVENOUS
Status: CANCELLED | OUTPATIENT
Start: 2019-11-12

## 2019-11-12 RX ORDER — CEFAZOLIN SODIUM 1 G/3ML
1 INJECTION, POWDER, FOR SOLUTION INTRAMUSCULAR; INTRAVENOUS SEE ADMIN INSTRUCTIONS
Status: CANCELLED | OUTPATIENT
Start: 2019-11-12

## 2019-11-12 RX ORDER — CITRIC ACID/SODIUM CITRATE 334-500MG
30 SOLUTION, ORAL ORAL
Status: CANCELLED | OUTPATIENT
Start: 2019-11-12

## 2019-11-12 RX ORDER — LIDOCAINE 40 MG/G
CREAM TOPICAL
Status: CANCELLED | OUTPATIENT
Start: 2019-11-12

## 2019-11-12 RX ORDER — SODIUM CHLORIDE, SODIUM LACTATE, POTASSIUM CHLORIDE, CALCIUM CHLORIDE 600; 310; 30; 20 MG/100ML; MG/100ML; MG/100ML; MG/100ML
INJECTION, SOLUTION INTRAVENOUS CONTINUOUS
Status: CANCELLED | OUTPATIENT
Start: 2019-11-12

## 2019-11-12 RX ORDER — ACETAMINOPHEN 325 MG/1
975 TABLET ORAL ONCE
Status: CANCELLED | OUTPATIENT
Start: 2019-11-12 | End: 2019-11-12

## 2019-11-12 ASSESSMENT — MIFFLIN-ST. JEOR: SCORE: 1534.18

## 2019-11-12 NOTE — PROGRESS NOTES
"Please see \"Imaging\" tab under \"Chart Review\" for details of today's US at the [facility].    Macario Benitez MD  Maternal-Fetal Medicine      "

## 2019-11-12 NOTE — PROGRESS NOTES
"[facility]   Prenatal Care Visit  SUBJECTIVE   Estelle Escobar is a 29 year old  at 22w1d by LMP c/w 7w2d US, di-di twin pregnancy, return OB visit.     Reports that she is feeling well overall. 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 The tightening is not overtly painful and does not resemble menstrual cramps. Denies vaginal bleeding, LOF, acid reflux, nausea, vomiting. Babies are moving well. Denies HA, SOB, chest pain, RUQ pain, sudden increase in swelling in LE. She does endorse thicker, whiter vaginal discharge recently but nothing that is causing itching, bad smell or dysuria. She reported low back pain at her previous visit, went to PT a few times since then and has had improvement w exercises.     She has questions about scheduling her , hospital tours and Dr. Gama's stance on bedrest for twins.     Pregnancy complicated by:   - di-di twin pregnancy (intertwin discordance 8.8%)  - hx of LEEP for CIN2 in 2016, NILM pap in 2018    OBJECTIVE   /70 (BP Location: Right arm, Patient Position: Chair)   Pulse 89   Ht 1.676 m (5' 6\")   Wt 79.2 kg (174 lb 11.2 oz)   LMP 06/10/2019   Breastfeeding? No   BMI 28.20 kg/m      See Ob Flowsheet    ASSESSMENT & PLAN   29 year old  at 22w1d by LMP c/w 7w2d US, di-di twin pregnancy SURI.    1. PNC: routine ob labs reviewed   Rh pos, Rubella immune   Reviewed total weight gain ~26lb so far, pt eats healthy diet, active, provided reassurance   Will schedule primary  section at 38w   Discussed bedrest not recommended    2. Genetic screening/ diagnosis:    - low risk cffDNA screen   - pt was + for 2 AR conditions on carrier screen, partner neg   - normal level 2 US    RTC 4 weeks    Staffed with Dr. Lanette Oliver MD  OBGYN Resident, PGY1    Appreciate note by Dr. Oliver. Patient has been seen and examined by me with the " resident, agree with above note.     Alina Gama MD

## 2019-11-12 NOTE — NURSING NOTE
Chief Complaint   Patient presents with     Prenatal Care     22w1d       See KENNETH Lin 11/12/2019

## 2019-11-12 NOTE — LETTER
"2019       RE: Estelle Escobar  [address] [address]     Dear Colleague,    Thank you for referring your patient, Estelle Escobar, to the [facility] at [facility]. Please see a copy of my visit note below.    [facility]   Prenatal Care Visit  SUBJECTIVE   Estelle Escobar is a 29 year old  at 22w1d by LMP c/w 7w2d US, di-di twin pregnancy, return OB visit.     Reports that she is feeling well overall. 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 The tightening is not overtly painful and does not resemble menstrual cramps. Denies vaginal bleeding, LOF, acid reflux, nausea, vomiting. Babies are moving well. Denies HA, SOB, chest pain, RUQ pain, sudden increase in swelling in LE. She does endorse thicker, whiter vaginal discharge recently but nothing that is causing itching, bad smell or dysuria. She reported low back pain at her previous visit, went to PT a few times since then and has had improvement w exercises.     She has questions about scheduling her , hospital tours and Dr. Gama's stance on bedrest for twins.     Pregnancy complicated by:   - di-di twin pregnancy (intertwin discordance 8.8%)  - hx of LEEP for CIN2 in 2016, NILM pap in 2018    OBJECTIVE   /70 (BP Location: Right arm, Patient Position: Chair)   Pulse 89   Ht 1.676 m (5' 6\")   Wt 79.2 kg (174 lb 11.2 oz)   LMP 06/10/2019   Breastfeeding? No   BMI 28.20 kg/m       See Ob Flowsheet    ASSESSMENT & PLAN   29 year old  at 22w1d by LMP c/w 7w2d US, di-di twin pregnancy SURI.    1. PNC: routine ob labs reviewed   Rh pos, Rubella immune   Reviewed total weight gain ~26lb so far, pt eats healthy diet, active, provided reassurance   Will schedule primary  section at 38w   Discussed bedrest not recommended    2. Genetic screening/ " diagnosis:    - low risk cffDNA screen   - pt was + for 2 AR conditions on carrier screen, partner neg   - normal level 2 US    RTC 4 weeks    Staffed with Dr. Lanette Oliver MD  OBGYN Resident, PGY1    Appreciate note by Dr. Oliver. Patient has been seen and examined by me with the resident, agree with above note.     Alina Gama MD

## 2019-11-15 ENCOUNTER — TELEPHONE (OUTPATIENT)
Dept: OBGYN | Facility: CLINIC | Age: 29
End: 2019-11-15

## 2019-11-15 NOTE — TELEPHONE ENCOUNTER
Confirmed c/section date, time and location 3/9/20 with arrival time at 6:30a.m with nothing to eat eight hours before scheduled c/section time and clear liquids up to two hours before scheduled c/section time, informed patient that a c/section map and letter will be mailed out.     to complete the following fields:            CHECKLIST     Google Calendar : Yes     Resident notified: Not Applicable     Clinic schedule blocked:  Not Applicable    Patient notified:Yes      Pre op information sent: Yes     Given to patient over the phone.Yes    Comments:

## 2019-12-11 ENCOUNTER — HOSPITAL ENCOUNTER (OUTPATIENT)
Dept: ULTRASOUND IMAGING | Facility: CLINIC | Age: 29
Discharge: HOME | End: 2019-12-11
Attending: OBSTETRICS & GYNECOLOGY | Admitting: OBSTETRICS & GYNECOLOGY
Payer: COMMERCIAL

## 2019-12-11 ENCOUNTER — OFFICE VISIT (OUTPATIENT)
Dept: MATERNAL FETAL MEDICINE | Facility: CLINIC | Age: 29
End: 2019-12-11
Attending: OBSTETRICS & GYNECOLOGY
Payer: COMMERCIAL

## 2019-12-11 ENCOUNTER — OFFICE VISIT (OUTPATIENT)
Dept: OBGYN | Facility: CLINIC | Age: 29
End: 2019-12-11
Attending: OBSTETRICS & GYNECOLOGY
Payer: COMMERCIAL

## 2019-12-11 VITALS
HEART RATE: 77 BPM | DIASTOLIC BLOOD PRESSURE: 77 MMHG | SYSTOLIC BLOOD PRESSURE: 116 MMHG | BODY MASS INDEX: 30.98 KG/M2 | WEIGHT: 192.8 LBS | HEIGHT: 66 IN

## 2019-12-11 DIAGNOSIS — O09.92 HIGH-RISK PREGNANCY IN SECOND TRIMESTER: Primary | ICD-10-CM

## 2019-12-11 DIAGNOSIS — O30.049 DICHORIONIC DIAMNIOTIC TWIN PREGNANCY, ANTEPARTUM: Primary | ICD-10-CM

## 2019-12-11 DIAGNOSIS — O30.049 DICHORIONIC DIAMNIOTIC TWIN PREGNANCY, ANTEPARTUM: ICD-10-CM

## 2019-12-11 LAB
ERYTHROCYTE [DISTWIDTH] IN BLOOD BY AUTOMATED COUNT: 13.1 % (ref 10–15)
GLUCOSE 1H P 50 G GLC PO SERPL-MCNC: 72 MG/DL (ref 60–129)
HCT VFR BLD AUTO: 37 % (ref 35–47)
HGB BLD-MCNC: 11.7 G/DL (ref 11.7–15.7)
MCH RBC QN AUTO: 30.5 PG (ref 26.5–33)
MCHC RBC AUTO-ENTMCNC: 31.6 G/DL (ref 31.5–36.5)
MCV RBC AUTO: 97 FL (ref 78–100)
PLATELET # BLD AUTO: 167 10E9/L (ref 150–450)
RBC # BLD AUTO: 3.83 10E12/L (ref 3.8–5.2)
WBC # BLD AUTO: 12.1 10E9/L (ref 4–11)

## 2019-12-11 PROCEDURE — G0463 HOSPITAL OUTPT CLINIC VISIT: HCPCS | Mod: ZF

## 2019-12-11 PROCEDURE — 82950 GLUCOSE TEST: CPT | Performed by: OBSTETRICS & GYNECOLOGY

## 2019-12-11 PROCEDURE — 85027 COMPLETE CBC AUTOMATED: CPT | Performed by: OBSTETRICS & GYNECOLOGY

## 2019-12-11 PROCEDURE — 82306 VITAMIN D 25 HYDROXY: CPT | Performed by: OBSTETRICS & GYNECOLOGY

## 2019-12-11 PROCEDURE — 86780 TREPONEMA PALLIDUM: CPT | Performed by: OBSTETRICS & GYNECOLOGY

## 2019-12-11 PROCEDURE — 76816 OB US FOLLOW-UP PER FETUS: CPT | Mod: 59

## 2019-12-11 PROCEDURE — 36415 COLL VENOUS BLD VENIPUNCTURE: CPT | Performed by: OBSTETRICS & GYNECOLOGY

## 2019-12-11 ASSESSMENT — MIFFLIN-ST. JEOR: SCORE: 1616.29

## 2019-12-11 NOTE — NURSING NOTE
Chief Complaint   Patient presents with     Prenatal Care     26w2d       See KENNETH Lin 12/11/2019

## 2019-12-11 NOTE — PROGRESS NOTES
"Please see \"Imaging\" tab under \"Chart Review\" for details of today's US at the [facility].    Macario Benitez MD  Maternal-Fetal Medicine      "

## 2019-12-11 NOTE — LETTER
"2019       RE: Estelle Escobar  [address] [address]     Dear Colleague,    Thank you for referring your patient, Estelle Escobar, to the [facility] at [facility]. Please see a copy of my visit note below.    S: Doing well. Hip pain improved. Good movement x2. No cramping, lof, vb. Some heartburn- taking tums. Planning primary CS. Plans to breastfeed, desires IUD for contraception.     O: /77 (BP Location: Right arm, Patient Position: Chair)   Pulse 77   Ht 1.676 m (5' 6\")   Wt 87.5 kg (192 lb 12.8 oz)   LMP 06/10/2019   Breastfeeding No   BMI 31.12 kg/m       A/P: 29 year old  @26w2dd di/di twins  EOB labs today  Currently has CS scheduled at 39w, discussed 38 wks- will work on rescheduling  Continue Q4wk growth US  RTC 2wks  S/p Flu shot, tdap next    Alina Gama MD        "

## 2019-12-12 LAB
DEPRECATED CALCIDIOL+CALCIFEROL SERPL-MC: 32 UG/L (ref 20–75)
T PALLIDUM AB SER QL: NONREACTIVE

## 2019-12-13 DIAGNOSIS — O30.049 DICHORIONIC DIAMNIOTIC TWIN PREGNANCY, ANTEPARTUM: Primary | ICD-10-CM

## 2019-12-16 NOTE — PROGRESS NOTES
"S: Doing well. Hip pain improved. Good movement x2. No cramping, lof, vb. Some heartburn- taking tums. Planning primary CS. Plans to breastfeed, desires IUD for contraception.     O: /77 (BP Location: Right arm, Patient Position: Chair)   Pulse 77   Ht 1.676 m (5' 6\")   Wt 87.5 kg (192 lb 12.8 oz)   LMP 06/10/2019   Breastfeeding No   BMI 31.12 kg/m      A/P: 29 year old  @26w2dd di/di twins  EOB labs today  Currently has CS scheduled at 39w, discussed 38 wks- will work on rescheduling  Continue Q4wk growth US  RTC 2wks  S/p Flu shot, tdap next    Alina Gama MD    "

## 2019-12-26 ENCOUNTER — OFFICE VISIT (OUTPATIENT)
Dept: OBGYN | Facility: CLINIC | Age: 29
End: 2019-12-26
Attending: OBSTETRICS & GYNECOLOGY
Payer: COMMERCIAL

## 2019-12-26 VITALS
HEART RATE: 84 BPM | SYSTOLIC BLOOD PRESSURE: 136 MMHG | DIASTOLIC BLOOD PRESSURE: 88 MMHG | HEIGHT: 66 IN | WEIGHT: 200 LBS | BODY MASS INDEX: 32.14 KG/M2

## 2019-12-26 DIAGNOSIS — O30.049 DICHORIONIC DIAMNIOTIC TWIN PREGNANCY, ANTEPARTUM: ICD-10-CM

## 2019-12-26 DIAGNOSIS — Z34.03 ENCOUNTER FOR SUPERVISION OF NORMAL FIRST PREGNANCY IN THIRD TRIMESTER: Primary | ICD-10-CM

## 2019-12-26 PROCEDURE — 90715 TDAP VACCINE 7 YRS/> IM: CPT | Mod: ZF

## 2019-12-26 PROCEDURE — 90471 IMMUNIZATION ADMIN: CPT | Mod: ZF

## 2019-12-26 PROCEDURE — 25000128 H RX IP 250 OP 636: Mod: ZF

## 2019-12-26 PROCEDURE — G0463 HOSPITAL OUTPT CLINIC VISIT: HCPCS | Mod: 25

## 2019-12-26 ASSESSMENT — MIFFLIN-ST. JEOR: SCORE: 1648.94

## 2019-12-26 ASSESSMENT — PAIN SCALES - GENERAL: PAINLEVEL: NO PAIN (0)

## 2019-12-26 NOTE — LETTER
"2019       RE: Estelle Escobar  [address] [address]     Dear Colleague,    Thank you for referring your patient, Estelle Escobar, to the [facility] at [facility]. Please see a copy of my visit note below.    SURI Visit    S: Doing well overall. Is having trouble telling fetal movements apart from each other, baby A and B are now lying transversely. Otherwise doing well overall. Feels pressure/cramping when baby kicking cervix, otherwise no ctx. Denies VB, LOF, HA. Feels slightly SOB with how big she is getting but not different than previously. Denies headache.     O:   /88   Pulse 84   Ht 1.676 m (5' 6\")   Wt 90.7 kg (200 lb)   LMP 06/10/2019   BMI 32.28 kg/m        See OB flow sheet    28 yo  at 28w3d with di/di twins here for SURI. Already completed EOB visit, 28 week labs, wnl.     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 Staffed with Dr. Gama.     Leola Go MD PGY1  Obstetrics & Gynecology  19     Appreciate note by Dr. Go. Patient has been seen and examined by me with the resident, agree with above note.     Alina Gama MD      "

## 2020-01-01 ENCOUNTER — HOSPITAL ENCOUNTER (OUTPATIENT)
Age: 30
End: 2020-01-01
Admitting: OBSTETRICS & GYNECOLOGY
Payer: COMMERCIAL

## 2020-01-01 ENCOUNTER — ANESTHESIA EVENT (OUTPATIENT)
Dept: OBGYN | Facility: CLINIC | Age: 30
End: 2020-01-01
Payer: COMMERCIAL

## 2020-01-01 ENCOUNTER — HOSPITAL ENCOUNTER (INPATIENT)
Facility: CLINIC | Age: 30
LOS: 3 days | Discharge: HOME HEALTH | End: 2020-01-04
Attending: OBSTETRICS & GYNECOLOGY | Admitting: OBSTETRICS & GYNECOLOGY
Payer: COMMERCIAL

## 2020-01-01 ENCOUNTER — ANESTHESIA (OUTPATIENT)
Dept: OBGYN | Facility: CLINIC | Age: 30
End: 2020-01-01
Payer: COMMERCIAL

## 2020-01-01 DIAGNOSIS — O30.049 DICHORIONIC DIAMNIOTIC TWIN PREGNANCY, ANTEPARTUM: ICD-10-CM

## 2020-01-01 DIAGNOSIS — D62 ANEMIA DUE TO BLOOD LOSS, ACUTE: ICD-10-CM

## 2020-01-01 DIAGNOSIS — Z98.891 S/P CESAREAN SECTION: ICD-10-CM

## 2020-01-01 PROBLEM — O60.00 PRETERM LABOR: Status: ACTIVE | Noted: 2020-01-01

## 2020-01-01 PROBLEM — Z36.89 ENCOUNTER FOR TRIAGE IN PREGNANT PATIENT: Status: ACTIVE | Noted: 2020-01-01

## 2020-01-01 LAB
ABO + RH BLD: NORMAL
ABO + RH BLD: NORMAL
ALBUMIN UR-MCNC: NEGATIVE MG/DL
ALT SERPL W P-5'-P-CCNC: 19 U/L (ref 0–50)
AMPHETAMINES UR QL SCN: NEGATIVE
APPEARANCE UR: CLEAR
AST SERPL W P-5'-P-CCNC: 24 U/L (ref 0–45)
BACTERIA #/AREA URNS HPF: ABNORMAL /HPF
BASOPHILS # BLD AUTO: 0 10E9/L (ref 0–0.2)
BASOPHILS NFR BLD AUTO: 0.2 %
BILIRUB UR QL STRIP: NEGATIVE
BLD GP AB SCN SERPL QL: NORMAL
BLOOD BANK CMNT PATIENT-IMP: NORMAL
CANNABINOIDS UR QL: NEGATIVE
COCAINE UR QL: NEGATIVE
COLOR UR AUTO: ABNORMAL
CREAT SERPL-MCNC: 0.58 MG/DL (ref 0.52–1.04)
CREAT UR-MCNC: 80 MG/DL
DIFFERENTIAL METHOD BLD: ABNORMAL
EOSINOPHIL # BLD AUTO: 0.2 10E9/L (ref 0–0.7)
EOSINOPHIL NFR BLD AUTO: 1.5 %
ERYTHROCYTE [DISTWIDTH] IN BLOOD BY AUTOMATED COUNT: 13.2 % (ref 10–15)
ERYTHROCYTE [DISTWIDTH] IN BLOOD BY AUTOMATED COUNT: 13.2 % (ref 10–15)
GFR SERPL CREATININE-BSD FRML MDRD: >90 ML/MIN/{1.73_M2}
GLUCOSE UR STRIP-MCNC: NEGATIVE MG/DL
HCT VFR BLD AUTO: 29.3 % (ref 35–47)
HCT VFR BLD AUTO: 31.9 % (ref 35–47)
HGB BLD-MCNC: 10.6 G/DL (ref 11.7–15.7)
HGB BLD-MCNC: 9.6 G/DL (ref 11.7–15.7)
HGB UR QL STRIP: NEGATIVE
IMM GRANULOCYTES # BLD: 0.1 10E9/L (ref 0–0.4)
IMM GRANULOCYTES NFR BLD: 0.6 %
KETONES UR STRIP-MCNC: NEGATIVE MG/DL
LEUKOCYTE ESTERASE UR QL STRIP: NEGATIVE
LYMPHOCYTES # BLD AUTO: 2.4 10E9/L (ref 0.8–5.3)
LYMPHOCYTES NFR BLD AUTO: 20.5 %
MCH RBC QN AUTO: 31.1 PG (ref 26.5–33)
MCH RBC QN AUTO: 31.5 PG (ref 26.5–33)
MCHC RBC AUTO-ENTMCNC: 32.8 G/DL (ref 31.5–36.5)
MCHC RBC AUTO-ENTMCNC: 33.2 G/DL (ref 31.5–36.5)
MCV RBC AUTO: 95 FL (ref 78–100)
MCV RBC AUTO: 95 FL (ref 78–100)
MONOCYTES # BLD AUTO: 0.9 10E9/L (ref 0–1.3)
MONOCYTES NFR BLD AUTO: 7.5 %
NEUTROPHILS # BLD AUTO: 8.2 10E9/L (ref 1.6–8.3)
NEUTROPHILS NFR BLD AUTO: 69.7 %
NITRATE UR QL: NEGATIVE
NRBC # BLD AUTO: 0 10*3/UL
NRBC BLD AUTO-RTO: 0 /100
OPIATES UR QL SCN: NEGATIVE
PCP UR QL SCN: NEGATIVE
PH UR STRIP: 6.5 PH (ref 5–7)
PLATELET # BLD AUTO: 130 10E9/L (ref 150–450)
PLATELET # BLD AUTO: 160 10E9/L (ref 150–450)
PROT UR-MCNC: 0.23 G/L
PROT/CREAT 24H UR: 0.28 G/G CR (ref 0–0.2)
RBC # BLD AUTO: 3.09 10E12/L (ref 3.8–5.2)
RBC # BLD AUTO: 3.37 10E12/L (ref 3.8–5.2)
RBC #/AREA URNS AUTO: 1 /HPF (ref 0–2)
RUPTURE OF FETAL MEMBRANES BY ROM PLUS: POSITIVE
SOURCE: ABNORMAL
SP GR UR STRIP: 1.01 (ref 1–1.03)
SPECIMEN EXP DATE BLD: NORMAL
SPECIMEN SOURCE: NORMAL
SQUAMOUS #/AREA URNS AUTO: 6 /HPF (ref 0–1)
UROBILINOGEN UR STRIP-MCNC: NORMAL MG/DL (ref 0–2)
WBC # BLD AUTO: 11.7 10E9/L (ref 4–11)
WBC # BLD AUTO: 17.6 10E9/L (ref 4–11)
WBC #/AREA URNS AUTO: 1 /HPF (ref 0–5)
WET PREP SPEC: NORMAL

## 2020-01-01 PROCEDURE — 85027 COMPLETE CBC AUTOMATED: CPT | Performed by: OBSTETRICS & GYNECOLOGY

## 2020-01-01 PROCEDURE — 25800030 ZZH RX IP 258 OP 636: Performed by: NURSE ANESTHETIST, CERTIFIED REGISTERED

## 2020-01-01 PROCEDURE — 25800030 ZZH RX IP 258 OP 636: Performed by: STUDENT IN AN ORGANIZED HEALTH CARE EDUCATION/TRAINING PROGRAM

## 2020-01-01 PROCEDURE — 87491 CHLMYD TRACH DNA AMP PROBE: CPT | Performed by: STUDENT IN AN ORGANIZED HEALTH CARE EDUCATION/TRAINING PROGRAM

## 2020-01-01 PROCEDURE — 25000128 H RX IP 250 OP 636: Performed by: NURSE ANESTHETIST, CERTIFIED REGISTERED

## 2020-01-01 PROCEDURE — 36000057 ZZH SURGERY LEVEL 3 1ST 30 MIN - UMMC: Performed by: OBSTETRICS & GYNECOLOGY

## 2020-01-01 PROCEDURE — 25000132 ZZH RX MED GY IP 250 OP 250 PS 637: Performed by: STUDENT IN AN ORGANIZED HEALTH CARE EDUCATION/TRAINING PROGRAM

## 2020-01-01 PROCEDURE — 25000128 H RX IP 250 OP 636: Performed by: STUDENT IN AN ORGANIZED HEALTH CARE EDUCATION/TRAINING PROGRAM

## 2020-01-01 PROCEDURE — 86850 RBC ANTIBODY SCREEN: CPT | Performed by: STUDENT IN AN ORGANIZED HEALTH CARE EDUCATION/TRAINING PROGRAM

## 2020-01-01 PROCEDURE — 84112 EVAL AMNIOTIC FLUID PROTEIN: CPT | Performed by: STUDENT IN AN ORGANIZED HEALTH CARE EDUCATION/TRAINING PROGRAM

## 2020-01-01 PROCEDURE — 88307 TISSUE EXAM BY PATHOLOGIST: CPT | Mod: 26 | Performed by: OBSTETRICS & GYNECOLOGY

## 2020-01-01 PROCEDURE — 40000170 ZZH STATISTIC PRE-PROCEDURE ASSESSMENT II: Performed by: OBSTETRICS & GYNECOLOGY

## 2020-01-01 PROCEDURE — 82565 ASSAY OF CREATININE: CPT | Performed by: OBSTETRICS & GYNECOLOGY

## 2020-01-01 PROCEDURE — 25000132 ZZH RX MED GY IP 250 OP 250 PS 637: Performed by: OBSTETRICS & GYNECOLOGY

## 2020-01-01 PROCEDURE — 84156 ASSAY OF PROTEIN URINE: CPT | Performed by: OBSTETRICS & GYNECOLOGY

## 2020-01-01 PROCEDURE — 84460 ALANINE AMINO (ALT) (SGPT): CPT | Performed by: OBSTETRICS & GYNECOLOGY

## 2020-01-01 PROCEDURE — 25000128 H RX IP 250 OP 636: Performed by: OBSTETRICS & GYNECOLOGY

## 2020-01-01 PROCEDURE — 80307 DRUG TEST PRSMV CHEM ANLYZR: CPT | Performed by: STUDENT IN AN ORGANIZED HEALTH CARE EDUCATION/TRAINING PROGRAM

## 2020-01-01 PROCEDURE — 71000015 ZZH RECOVERY PHASE 1 LEVEL 2 EA ADDTL HR: Performed by: OBSTETRICS & GYNECOLOGY

## 2020-01-01 PROCEDURE — 36415 COLL VENOUS BLD VENIPUNCTURE: CPT | Performed by: OBSTETRICS & GYNECOLOGY

## 2020-01-01 PROCEDURE — 25000128 H RX IP 250 OP 636

## 2020-01-01 PROCEDURE — 36000059 ZZH SURGERY LEVEL 3 EA 15 ADDTL MIN UMMC: Performed by: OBSTETRICS & GYNECOLOGY

## 2020-01-01 PROCEDURE — 40000010 ZZH STATISTIC ANES STAT CODE-CRNA PER MINUTE: Performed by: OBSTETRICS & GYNECOLOGY

## 2020-01-01 PROCEDURE — 12000001 ZZH R&B MED SURG/OB UMMC

## 2020-01-01 PROCEDURE — C9290 INJ, BUPIVACAINE LIPOSOME: HCPCS | Performed by: STUDENT IN AN ORGANIZED HEALTH CARE EDUCATION/TRAINING PROGRAM

## 2020-01-01 PROCEDURE — 37000009 ZZH ANESTHESIA TECHNICAL FEE, EACH ADDTL 15 MIN: Performed by: OBSTETRICS & GYNECOLOGY

## 2020-01-01 PROCEDURE — 86900 BLOOD TYPING SEROLOGIC ABO: CPT | Performed by: STUDENT IN AN ORGANIZED HEALTH CARE EDUCATION/TRAINING PROGRAM

## 2020-01-01 PROCEDURE — 87591 N.GONORRHOEAE DNA AMP PROB: CPT | Performed by: STUDENT IN AN ORGANIZED HEALTH CARE EDUCATION/TRAINING PROGRAM

## 2020-01-01 PROCEDURE — 99231 SBSQ HOSP IP/OBS SF/LOW 25: CPT | Performed by: PHYSICIAN ASSISTANT

## 2020-01-01 PROCEDURE — 81001 URINALYSIS AUTO W/SCOPE: CPT | Performed by: STUDENT IN AN ORGANIZED HEALTH CARE EDUCATION/TRAINING PROGRAM

## 2020-01-01 PROCEDURE — 37000008 ZZH ANESTHESIA TECHNICAL FEE, 1ST 30 MIN: Performed by: OBSTETRICS & GYNECOLOGY

## 2020-01-01 PROCEDURE — 25800030 ZZH RX IP 258 OP 636

## 2020-01-01 PROCEDURE — 27210794 ZZH OR GENERAL SUPPLY STERILE: Performed by: OBSTETRICS & GYNECOLOGY

## 2020-01-01 PROCEDURE — 84450 TRANSFERASE (AST) (SGOT): CPT | Performed by: OBSTETRICS & GYNECOLOGY

## 2020-01-01 PROCEDURE — 71000014 ZZH RECOVERY PHASE 1 LEVEL 2 FIRST HR: Performed by: OBSTETRICS & GYNECOLOGY

## 2020-01-01 PROCEDURE — 27110028 ZZH OR GENERAL SUPPLY NON-STERILE: Performed by: OBSTETRICS & GYNECOLOGY

## 2020-01-01 PROCEDURE — G0463 HOSPITAL OUTPT CLINIC VISIT: HCPCS

## 2020-01-01 PROCEDURE — 85025 COMPLETE CBC W/AUTO DIFF WBC: CPT | Performed by: STUDENT IN AN ORGANIZED HEALTH CARE EDUCATION/TRAINING PROGRAM

## 2020-01-01 PROCEDURE — 88307 TISSUE EXAM BY PATHOLOGIST: CPT | Performed by: OBSTETRICS & GYNECOLOGY

## 2020-01-01 PROCEDURE — 25000132 ZZH RX MED GY IP 250 OP 250 PS 637

## 2020-01-01 PROCEDURE — 86901 BLOOD TYPING SEROLOGIC RH(D): CPT | Performed by: STUDENT IN AN ORGANIZED HEALTH CARE EDUCATION/TRAINING PROGRAM

## 2020-01-01 PROCEDURE — 25000125 ZZHC RX 250: Performed by: OBSTETRICS & GYNECOLOGY

## 2020-01-01 PROCEDURE — 40000268 ZZH STATISTIC NO CHARGES

## 2020-01-01 PROCEDURE — 25000125 ZZHC RX 250: Performed by: NURSE ANESTHETIST, CERTIFIED REGISTERED

## 2020-01-01 PROCEDURE — 87210 SMEAR WET MOUNT SALINE/INK: CPT | Performed by: STUDENT IN AN ORGANIZED HEALTH CARE EDUCATION/TRAINING PROGRAM

## 2020-01-01 RX ORDER — KETOROLAC TROMETHAMINE 30 MG/ML
30 INJECTION, SOLUTION INTRAMUSCULAR; INTRAVENOUS EVERY 6 HOURS
Status: DISPENSED | OUTPATIENT
Start: 2020-01-01 | End: 2020-01-02

## 2020-01-01 RX ORDER — FENTANYL CITRATE 50 UG/ML
INJECTION, SOLUTION INTRAMUSCULAR; INTRAVENOUS PRN
Status: DISCONTINUED | OUTPATIENT
Start: 2020-01-01 | End: 2020-01-01

## 2020-01-01 RX ORDER — MAGNESIUM SULFATE IN WATER 40 MG/ML
2 INJECTION, SOLUTION INTRAVENOUS CONTINUOUS
Status: DISCONTINUED | OUTPATIENT
Start: 2020-01-01 | End: 2020-01-04 | Stop reason: HOSPADM

## 2020-01-01 RX ORDER — INDOMETHACIN 25 MG/1
50 CAPSULE ORAL ONCE
Status: COMPLETED | OUTPATIENT
Start: 2020-01-01 | End: 2020-01-01

## 2020-01-01 RX ORDER — BISACODYL 10 MG
10 SUPPOSITORY, RECTAL RECTAL DAILY PRN
Status: DISCONTINUED | OUTPATIENT
Start: 2020-01-03 | End: 2020-01-04 | Stop reason: HOSPADM

## 2020-01-01 RX ORDER — SIMETHICONE 80 MG
80 TABLET,CHEWABLE ORAL 4 TIMES DAILY PRN
Status: DISCONTINUED | OUTPATIENT
Start: 2020-01-01 | End: 2020-01-04 | Stop reason: HOSPADM

## 2020-01-01 RX ORDER — AZITHROMYCIN 250 MG/1
1000 TABLET, FILM COATED ORAL ONCE
Status: COMPLETED | OUTPATIENT
Start: 2020-01-01 | End: 2020-01-01

## 2020-01-01 RX ORDER — OXYTOCIN/0.9 % SODIUM CHLORIDE 30/500 ML
340 PLASTIC BAG, INJECTION (ML) INTRAVENOUS CONTINUOUS PRN
Status: DISCONTINUED | OUTPATIENT
Start: 2020-01-01 | End: 2020-01-04 | Stop reason: HOSPADM

## 2020-01-01 RX ORDER — ACETAMINOPHEN 325 MG/1
975 TABLET ORAL EVERY 6 HOURS
Status: DISCONTINUED | OUTPATIENT
Start: 2020-01-01 | End: 2020-01-02

## 2020-01-01 RX ORDER — ONDANSETRON 2 MG/ML
4 INJECTION INTRAMUSCULAR; INTRAVENOUS EVERY 6 HOURS PRN
Status: DISCONTINUED | OUTPATIENT
Start: 2020-01-01 | End: 2020-01-01

## 2020-01-01 RX ORDER — LIDOCAINE 40 MG/G
CREAM TOPICAL
Status: DISCONTINUED | OUTPATIENT
Start: 2020-01-01 | End: 2020-01-01

## 2020-01-01 RX ORDER — OXYTOCIN/0.9 % SODIUM CHLORIDE 30/500 ML
PLASTIC BAG, INJECTION (ML) INTRAVENOUS CONTINUOUS PRN
Status: DISCONTINUED | OUTPATIENT
Start: 2020-01-01 | End: 2020-01-01

## 2020-01-01 RX ORDER — NALBUPHINE HYDROCHLORIDE 10 MG/ML
2.5-5 INJECTION, SOLUTION INTRAMUSCULAR; INTRAVENOUS; SUBCUTANEOUS EVERY 6 HOURS PRN
Status: DISCONTINUED | OUTPATIENT
Start: 2020-01-01 | End: 2020-01-04 | Stop reason: HOSPADM

## 2020-01-01 RX ORDER — AMOXICILLIN 250 MG
1 CAPSULE ORAL 2 TIMES DAILY PRN
Status: DISCONTINUED | OUTPATIENT
Start: 2020-01-01 | End: 2020-01-04 | Stop reason: HOSPADM

## 2020-01-01 RX ORDER — ALUMINA, MAGNESIA, AND SIMETHICONE 2400; 2400; 240 MG/30ML; MG/30ML; MG/30ML
30 SUSPENSION ORAL
Status: DISCONTINUED | OUTPATIENT
Start: 2020-01-01 | End: 2020-01-04 | Stop reason: HOSPADM

## 2020-01-01 RX ORDER — INDOMETHACIN 25 MG/1
25 CAPSULE ORAL
Status: DISCONTINUED | OUTPATIENT
Start: 2020-01-01 | End: 2020-01-01

## 2020-01-01 RX ORDER — ONDANSETRON 2 MG/ML
4 INJECTION INTRAMUSCULAR; INTRAVENOUS EVERY 6 HOURS PRN
Status: DISCONTINUED | OUTPATIENT
Start: 2020-01-01 | End: 2020-01-04 | Stop reason: HOSPADM

## 2020-01-01 RX ORDER — ONDANSETRON 4 MG/1
4 TABLET, ORALLY DISINTEGRATING ORAL EVERY 6 HOURS PRN
Status: DISCONTINUED | OUTPATIENT
Start: 2020-01-01 | End: 2020-01-04 | Stop reason: HOSPADM

## 2020-01-01 RX ORDER — OXYTOCIN 10 [USP'U]/ML
10 INJECTION, SOLUTION INTRAMUSCULAR; INTRAVENOUS
Status: DISCONTINUED | OUTPATIENT
Start: 2020-01-01 | End: 2020-01-04 | Stop reason: HOSPADM

## 2020-01-01 RX ORDER — DIPHENHYDRAMINE HYDROCHLORIDE 50 MG/ML
25 INJECTION INTRAMUSCULAR; INTRAVENOUS EVERY 6 HOURS PRN
Status: DISCONTINUED | OUTPATIENT
Start: 2020-01-01 | End: 2020-01-04 | Stop reason: HOSPADM

## 2020-01-01 RX ORDER — ACETAMINOPHEN 325 MG/1
975 TABLET ORAL EVERY 4 HOURS
Status: DISCONTINUED | OUTPATIENT
Start: 2020-01-01 | End: 2020-01-01

## 2020-01-01 RX ORDER — LIDOCAINE 40 MG/G
CREAM TOPICAL
Status: DISCONTINUED | OUTPATIENT
Start: 2020-01-01 | End: 2020-01-04

## 2020-01-01 RX ORDER — LIDOCAINE 40 MG/G
CREAM TOPICAL
Status: DISCONTINUED | OUTPATIENT
Start: 2020-01-01 | End: 2020-01-04 | Stop reason: HOSPADM

## 2020-01-01 RX ORDER — PROCHLORPERAZINE 25 MG
25 SUPPOSITORY, RECTAL RECTAL EVERY 12 HOURS PRN
Status: DISCONTINUED | OUTPATIENT
Start: 2020-01-01 | End: 2020-01-04 | Stop reason: HOSPADM

## 2020-01-01 RX ORDER — PHENYLEPHRINE HCL IN 0.9% NACL 1 MG/10 ML
SYRINGE (ML) INTRAVENOUS CONTINUOUS PRN
Status: DISCONTINUED | OUTPATIENT
Start: 2020-01-01 | End: 2020-01-01

## 2020-01-01 RX ORDER — LANOLIN 100 %
OINTMENT (GRAM) TOPICAL
Status: DISCONTINUED | OUTPATIENT
Start: 2020-01-01 | End: 2020-01-04 | Stop reason: HOSPADM

## 2020-01-01 RX ORDER — CALCIUM GLUCONATE 94 MG/ML
1 INJECTION, SOLUTION INTRAVENOUS
Status: DISCONTINUED | OUTPATIENT
Start: 2020-01-01 | End: 2020-01-04 | Stop reason: HOSPADM

## 2020-01-01 RX ORDER — IBUPROFEN 800 MG/1
800 TABLET, FILM COATED ORAL EVERY 6 HOURS PRN
Status: DISCONTINUED | OUTPATIENT
Start: 2020-01-01 | End: 2020-01-02

## 2020-01-01 RX ORDER — INDOMETHACIN 25 MG/1
50 CAPSULE ORAL ONCE
Status: DISCONTINUED | OUTPATIENT
Start: 2020-01-01 | End: 2020-01-01

## 2020-01-01 RX ORDER — BUPIVACAINE HYDROCHLORIDE 7.5 MG/ML
INJECTION, SOLUTION INTRASPINAL PRN
Status: DISCONTINUED | OUTPATIENT
Start: 2020-01-01 | End: 2020-01-01

## 2020-01-01 RX ORDER — CEFAZOLIN SODIUM 1 G/3ML
1 INJECTION, POWDER, FOR SOLUTION INTRAMUSCULAR; INTRAVENOUS SEE ADMIN INSTRUCTIONS
Status: DISCONTINUED | OUTPATIENT
Start: 2020-01-01 | End: 2020-01-01 | Stop reason: HOSPADM

## 2020-01-01 RX ORDER — OXYTOCIN/0.9 % SODIUM CHLORIDE 30/500 ML
100 PLASTIC BAG, INJECTION (ML) INTRAVENOUS CONTINUOUS
Status: DISCONTINUED | OUTPATIENT
Start: 2020-01-01 | End: 2020-01-04 | Stop reason: HOSPADM

## 2020-01-01 RX ORDER — SIMETHICONE 80 MG
160 TABLET,CHEWABLE ORAL EVERY 4 HOURS PRN
Status: DISCONTINUED | OUTPATIENT
Start: 2020-01-01 | End: 2020-01-04

## 2020-01-01 RX ORDER — KETOROLAC TROMETHAMINE 30 MG/ML
INJECTION, SOLUTION INTRAMUSCULAR; INTRAVENOUS PRN
Status: DISCONTINUED | OUTPATIENT
Start: 2020-01-01 | End: 2020-01-01

## 2020-01-01 RX ORDER — CEFAZOLIN SODIUM 2 G/100ML
2 INJECTION, SOLUTION INTRAVENOUS
Status: COMPLETED | OUTPATIENT
Start: 2020-01-01 | End: 2020-01-01

## 2020-01-01 RX ORDER — CITRIC ACID/SODIUM CITRATE 334-500MG
SOLUTION, ORAL ORAL
Status: COMPLETED
Start: 2020-01-01 | End: 2020-01-01

## 2020-01-01 RX ORDER — ONDANSETRON 2 MG/ML
4 INJECTION INTRAMUSCULAR; INTRAVENOUS EVERY 6 HOURS PRN
Status: DISCONTINUED | OUTPATIENT
Start: 2020-01-01 | End: 2020-01-04

## 2020-01-01 RX ORDER — AMOXICILLIN 250 MG/1
250 CAPSULE ORAL 3 TIMES DAILY
Status: DISCONTINUED | OUTPATIENT
Start: 2020-01-03 | End: 2020-01-01

## 2020-01-01 RX ORDER — SODIUM CHLORIDE, SODIUM LACTATE, POTASSIUM CHLORIDE, CALCIUM CHLORIDE 600; 310; 30; 20 MG/100ML; MG/100ML; MG/100ML; MG/100ML
INJECTION, SOLUTION INTRAVENOUS CONTINUOUS PRN
Status: DISCONTINUED | OUTPATIENT
Start: 2020-01-01 | End: 2020-01-01

## 2020-01-01 RX ORDER — NALOXONE HYDROCHLORIDE 0.4 MG/ML
.1-.4 INJECTION, SOLUTION INTRAMUSCULAR; INTRAVENOUS; SUBCUTANEOUS
Status: DISCONTINUED | OUTPATIENT
Start: 2020-01-01 | End: 2020-01-04

## 2020-01-01 RX ORDER — SODIUM CHLORIDE, SODIUM LACTATE, POTASSIUM CHLORIDE, CALCIUM CHLORIDE 600; 310; 30; 20 MG/100ML; MG/100ML; MG/100ML; MG/100ML
INJECTION, SOLUTION INTRAVENOUS
Status: DISCONTINUED
Start: 2020-01-01 | End: 2020-01-01 | Stop reason: HOSPADM

## 2020-01-01 RX ORDER — PRENATAL VIT/IRON FUM/FOLIC AC 27MG-0.8MG
1 TABLET ORAL DAILY
Status: DISCONTINUED | OUTPATIENT
Start: 2020-01-01 | End: 2020-01-04 | Stop reason: HOSPADM

## 2020-01-01 RX ORDER — ACETAMINOPHEN 325 MG/1
975 TABLET ORAL ONCE
Status: COMPLETED | OUTPATIENT
Start: 2020-01-01 | End: 2020-01-01

## 2020-01-01 RX ORDER — NALOXONE HYDROCHLORIDE 0.4 MG/ML
.1-.4 INJECTION, SOLUTION INTRAMUSCULAR; INTRAVENOUS; SUBCUTANEOUS
Status: DISCONTINUED | OUTPATIENT
Start: 2020-01-01 | End: 2020-01-04 | Stop reason: HOSPADM

## 2020-01-01 RX ORDER — HYDROCORTISONE 2.5 %
CREAM (GRAM) TOPICAL 3 TIMES DAILY PRN
Status: DISCONTINUED | OUTPATIENT
Start: 2020-01-01 | End: 2020-01-04 | Stop reason: HOSPADM

## 2020-01-01 RX ORDER — DEXTROSE, SODIUM CHLORIDE, SODIUM LACTATE, POTASSIUM CHLORIDE, AND CALCIUM CHLORIDE 5; .6; .31; .03; .02 G/100ML; G/100ML; G/100ML; G/100ML; G/100ML
INJECTION, SOLUTION INTRAVENOUS CONTINUOUS
Status: DISCONTINUED | OUTPATIENT
Start: 2020-01-01 | End: 2020-01-04 | Stop reason: HOSPADM

## 2020-01-01 RX ORDER — AMOXICILLIN 250 MG
2 CAPSULE ORAL 2 TIMES DAILY PRN
Status: DISCONTINUED | OUTPATIENT
Start: 2020-01-01 | End: 2020-01-04 | Stop reason: HOSPADM

## 2020-01-01 RX ORDER — AMPICILLIN 2 G/1
2 INJECTION, POWDER, FOR SOLUTION INTRAVENOUS EVERY 6 HOURS
Status: DISCONTINUED | OUTPATIENT
Start: 2020-01-01 | End: 2020-01-01

## 2020-01-01 RX ORDER — AMOXICILLIN 250 MG
2 CAPSULE ORAL 2 TIMES DAILY
Status: DISCONTINUED | OUTPATIENT
Start: 2020-01-01 | End: 2020-01-04 | Stop reason: HOSPADM

## 2020-01-01 RX ORDER — INDOMETHACIN 25 MG/1
25 CAPSULE ORAL EVERY 6 HOURS
Status: DISCONTINUED | OUTPATIENT
Start: 2020-01-01 | End: 2020-01-01

## 2020-01-01 RX ORDER — ONDANSETRON 2 MG/ML
INJECTION INTRAMUSCULAR; INTRAVENOUS PRN
Status: DISCONTINUED | OUTPATIENT
Start: 2020-01-01 | End: 2020-01-01

## 2020-01-01 RX ORDER — BETAMETHASONE SODIUM PHOSPHATE AND BETAMETHASONE ACETATE 3; 3 MG/ML; MG/ML
12 INJECTION, SUSPENSION INTRA-ARTICULAR; INTRALESIONAL; INTRAMUSCULAR; SOFT TISSUE EVERY 24 HOURS
Status: DISCONTINUED | OUTPATIENT
Start: 2020-01-01 | End: 2020-01-01

## 2020-01-01 RX ORDER — BUPIVACAINE HYDROCHLORIDE 2.5 MG/ML
INJECTION, SOLUTION EPIDURAL; INFILTRATION; INTRACAUDAL PRN
Status: DISCONTINUED | OUTPATIENT
Start: 2020-01-01 | End: 2020-01-01

## 2020-01-01 RX ORDER — SODIUM CHLORIDE, SODIUM LACTATE, POTASSIUM CHLORIDE, CALCIUM CHLORIDE 600; 310; 30; 20 MG/100ML; MG/100ML; MG/100ML; MG/100ML
INJECTION, SOLUTION INTRAVENOUS CONTINUOUS
Status: DISCONTINUED | OUTPATIENT
Start: 2020-01-01 | End: 2020-01-01 | Stop reason: HOSPADM

## 2020-01-01 RX ORDER — ACETAMINOPHEN 325 MG/1
975 TABLET ORAL EVERY 4 HOURS PRN
Status: DISCONTINUED | OUTPATIENT
Start: 2020-01-01 | End: 2020-01-01

## 2020-01-01 RX ORDER — METOCLOPRAMIDE HYDROCHLORIDE 5 MG/ML
10 INJECTION INTRAMUSCULAR; INTRAVENOUS EVERY 6 HOURS PRN
Status: DISCONTINUED | OUTPATIENT
Start: 2020-01-01 | End: 2020-01-04 | Stop reason: HOSPADM

## 2020-01-01 RX ORDER — FENTANYL CITRATE 50 UG/ML
25-50 INJECTION, SOLUTION INTRAMUSCULAR; INTRAVENOUS EVERY 5 MIN PRN
Status: DISCONTINUED | OUTPATIENT
Start: 2020-01-01 | End: 2020-01-04

## 2020-01-01 RX ORDER — CITRIC ACID/SODIUM CITRATE 334-500MG
30 SOLUTION, ORAL ORAL
Status: COMPLETED | OUTPATIENT
Start: 2020-01-01 | End: 2020-01-01

## 2020-01-01 RX ORDER — MORPHINE SULFATE 1 MG/ML
INJECTION, SOLUTION EPIDURAL; INTRATHECAL; INTRAVENOUS PRN
Status: DISCONTINUED | OUTPATIENT
Start: 2020-01-01 | End: 2020-01-01

## 2020-01-01 RX ORDER — AMOXICILLIN 250 MG
1 CAPSULE ORAL 2 TIMES DAILY
Status: DISCONTINUED | OUTPATIENT
Start: 2020-01-01 | End: 2020-01-04 | Stop reason: HOSPADM

## 2020-01-01 RX ORDER — DIPHENHYDRAMINE HCL 25 MG
25 CAPSULE ORAL EVERY 6 HOURS PRN
Status: DISCONTINUED | OUTPATIENT
Start: 2020-01-01 | End: 2020-01-04 | Stop reason: HOSPADM

## 2020-01-01 RX ORDER — PHENYLEPHRINE HCL IN 0.9% NACL 1 MG/10 ML
100 SYRINGE (ML) INTRAVENOUS EVERY 5 MIN PRN
Status: DISCONTINUED | OUTPATIENT
Start: 2020-01-01 | End: 2020-01-04

## 2020-01-01 RX ORDER — SODIUM CHLORIDE, SODIUM LACTATE, POTASSIUM CHLORIDE, CALCIUM CHLORIDE 600; 310; 30; 20 MG/100ML; MG/100ML; MG/100ML; MG/100ML
INJECTION, SOLUTION INTRAVENOUS CONTINUOUS
Status: DISCONTINUED | OUTPATIENT
Start: 2020-01-01 | End: 2020-01-04 | Stop reason: HOSPADM

## 2020-01-01 RX ADMIN — Medication 30 ML: at 08:53

## 2020-01-01 RX ADMIN — MAGNESIUM SULFATE HEPTAHYDRATE 2 G/HR: 40 INJECTION, SOLUTION INTRAVENOUS at 06:08

## 2020-01-01 RX ADMIN — KETOROLAC TROMETHAMINE 30 MG: 30 INJECTION, SOLUTION INTRAMUSCULAR at 10:40

## 2020-01-01 RX ADMIN — SODIUM CHLORIDE, POTASSIUM CHLORIDE, SODIUM LACTATE AND CALCIUM CHLORIDE: 600; 310; 30; 20 INJECTION, SOLUTION INTRAVENOUS at 02:51

## 2020-01-01 RX ADMIN — Medication 100 ML/HR: at 12:05

## 2020-01-01 RX ADMIN — KETOROLAC TROMETHAMINE 30 MG: 30 INJECTION, SOLUTION INTRAMUSCULAR at 22:02

## 2020-01-01 RX ADMIN — OXYTOCIN-SODIUM CHLORIDE 0.9% IV SOLN 30 UNIT/500ML 300 ML/HR: 30-0.9/5 SOLUTION at 09:36

## 2020-01-01 RX ADMIN — INDOMETHACIN 50 MG: 25 CAPSULE ORAL at 04:15

## 2020-01-01 RX ADMIN — KETOROLAC TROMETHAMINE 30 MG: 30 INJECTION, SOLUTION INTRAMUSCULAR at 16:30

## 2020-01-01 RX ADMIN — ACETAMINOPHEN 975 MG: 325 TABLET, FILM COATED ORAL at 12:17

## 2020-01-01 RX ADMIN — ACETAMINOPHEN 975 MG: 325 TABLET, FILM COATED ORAL at 19:37

## 2020-01-01 RX ADMIN — AZITHROMYCIN MONOHYDRATE 1000 MG: 250 TABLET ORAL at 03:08

## 2020-01-01 RX ADMIN — BETAMETHASONE SODIUM PHOSPHATE AND BETAMETHASONE ACETATE 12 MG: 3; 3 INJECTION, SUSPENSION INTRA-ARTICULAR; INTRALESIONAL; INTRAMUSCULAR at 03:43

## 2020-01-01 RX ADMIN — MAGNESIUM SULFATE HEPTAHYDRATE 6 G: 500 INJECTION, SOLUTION INTRAMUSCULAR; INTRAVENOUS at 05:35

## 2020-01-01 RX ADMIN — BUPIVACAINE HYDROCHLORIDE 10 ML: 2.5 INJECTION, SOLUTION EPIDURAL; INFILTRATION; INTRACAUDAL at 10:41

## 2020-01-01 RX ADMIN — SODIUM CHLORIDE, POTASSIUM CHLORIDE, SODIUM LACTATE AND CALCIUM CHLORIDE: 600; 310; 30; 20 INJECTION, SOLUTION INTRAVENOUS at 09:48

## 2020-01-01 RX ADMIN — SODIUM CHLORIDE, POTASSIUM CHLORIDE, SODIUM LACTATE AND CALCIUM CHLORIDE: 600; 310; 30; 20 INJECTION, SOLUTION INTRAVENOUS at 09:00

## 2020-01-01 RX ADMIN — CEFAZOLIN SODIUM 2 G: 2 INJECTION, SOLUTION INTRAVENOUS at 09:07

## 2020-01-01 RX ADMIN — BUPIVACAINE 10 ML: 13.3 INJECTION, SUSPENSION, LIPOSOMAL INFILTRATION at 10:41

## 2020-01-01 RX ADMIN — BUPIVACAINE HYDROCHLORIDE 10 ML: 2.5 INJECTION, SOLUTION EPIDURAL; INFILTRATION; INTRACAUDAL at 10:43

## 2020-01-01 RX ADMIN — SENNOSIDES AND DOCUSATE SODIUM 1 TABLET: 8.6; 5 TABLET ORAL at 19:38

## 2020-01-01 RX ADMIN — AMPICILLIN SODIUM 2 G: 2 INJECTION, POWDER, FOR SOLUTION INTRAMUSCULAR; INTRAVENOUS at 03:08

## 2020-01-01 RX ADMIN — FENTANYL CITRATE 10 MCG: 50 INJECTION, SOLUTION INTRAMUSCULAR; INTRAVENOUS at 09:08

## 2020-01-01 RX ADMIN — SODIUM CITRATE AND CITRIC ACID MONOHYDRATE 30 ML: 500; 334 SOLUTION ORAL at 08:53

## 2020-01-01 RX ADMIN — Medication 6 G: at 05:35

## 2020-01-01 RX ADMIN — MORPHINE SULFATE 0.15 MG: 1 INJECTION, SOLUTION EPIDURAL; INTRATHECAL; INTRAVENOUS at 09:08

## 2020-01-01 RX ADMIN — ONDANSETRON 4 MG: 2 INJECTION INTRAMUSCULAR; INTRAVENOUS at 09:40

## 2020-01-01 RX ADMIN — Medication 50 MCG/MIN: at 09:08

## 2020-01-01 RX ADMIN — BUPIVACAINE HYDROCHLORIDE IN DEXTROSE 1.6 ML: 7.5 INJECTION, SOLUTION SUBARACHNOID at 09:08

## 2020-01-01 RX ADMIN — BUPIVACAINE 10 ML: 13.3 INJECTION, SUSPENSION, LIPOSOMAL INFILTRATION at 10:43

## 2020-01-01 RX ADMIN — SODIUM CHLORIDE, SODIUM LACTATE, POTASSIUM CHLORIDE, CALCIUM CHLORIDE AND DEXTROSE MONOHYDRATE 125 ML/HR: 5; 600; 310; 30; 20 INJECTION, SOLUTION INTRAVENOUS at 18:00

## 2020-01-01 ASSESSMENT — MIFFLIN-ST. JEOR: SCORE: 1648.94

## 2020-01-01 ASSESSMENT — ENCOUNTER SYMPTOMS: SEIZURES: 0

## 2020-01-01 NOTE — PROVIDER NOTIFICATION
01/01/20 0440   Provider Notification   Provider Name/Title Dr. Santana   Method of Notification In Department   Notification Reason Maternal Vital Sign Change   Pt had an elevated blood pressure 141/91, over viewed with provider. Plan is to continue to monitor pt closely, continue q4hr blood pressures. Pt educated on symptoms and verbally agreed to tell nurse or provider if symptoms start.

## 2020-01-01 NOTE — PROVIDER NOTIFICATION
01/01/20 0518   Provider Notification   Provider Name/Title Dr. Santana   Method of Notification In Department   Request Evaluate in Person   Notification Reason Pain;Other (Comment)  (Pt getting more uncomfortable with contractions)     Writer at bedside to help adj external US monitors. Pt breathing through contractions and stated they are getting more uncomfortable. Contractions palpate moderate. RN notified Dr. Santana to assess pt d/t increasingly painful contractions. MD to see pt.

## 2020-01-01 NOTE — PLAN OF CARE
Estelle continued in labor and was found to be changing her cervix so the decision was made to have a CS for delivery. Consents were signed and witnessed, patients abdomen was prepped and patient along with anesthesia team arrived in the OR at 0900. Baby Boys were delivered at 9:32 and 9:34. She got a TAPs block for pain control before she left the OR. In the PACU Megans blood pressure was found to be elevated. HELLP labs were drawn and urine was sent. Plan to continue to monitor closely with a stop in the NICU on our way to PP Room.

## 2020-01-01 NOTE — PROGRESS NOTES
S: Key painfully. Breathing through contractions. Back pain, no pressure.   O:   Vitals:    20 0700 20 0710 20 0730   BP:  139/80    Pulse:      Resp:  16 18   Temp:   98.5  F (36.9  C)   SpO2: 98%  98%     FHT:   A: 130 moderate variability, accels present, no decels, cat 1    B: 130 moderate variability, accels present, no decels, cat 1  TOCO: Ctx Q2-3min   SVE: 3-4/90/-1 small parts palpated    A/P: 29 year old  @29w2d di/di twins, PPROM A now with PTL  Recommend primary CS at this time with rapid cervical dilation and breech presentation  Informed consent previously signed  Anesthesia updated    Alina Gama MD

## 2020-01-01 NOTE — PROVIDER NOTIFICATION
01/01/20 0126   Provider Notification   Provider Name/Title Dr. CARMEN Santana   Method of Notification At Bedside   Provider at bedside assessing pt in triage for pt arrival. Pt states that she had a gush of fluid at around midnight. Pt denies any vaginal bleeding, cramping, vaginal discharge or abdominal trauma. Plan is to send rom plus, urine culture and utox screening. After results are back from rom plus will do further testing. Will continue to monitor pt, vss, afebrile and EFM continuously.

## 2020-01-01 NOTE — PLAN OF CARE
Data: Estelle Escobar transferred to 7125 via cart at 1330. Twin Babies in NICU.  Action: Receiving unit notified of transfer: Yes. Patient and family notified of room change. Report given to REGGIE Gerardo at 1300. Belongings sent to receiving unit. Accompanied by Registered Nurse. Oriented patient to surroundings. Call light within reach.   Response: Patient tolerated transfer and is stable.

## 2020-01-01 NOTE — PLAN OF CARE
VSS. Postpartum checks WDL. Incisional drsg CDI. Lopez in place with adequate urine output. Pumping for twins in the NICU. Denies pain, taking scheduled tylenol and toradol.  supportive at the bedside.

## 2020-01-01 NOTE — ANESTHESIA PREPROCEDURE EVALUATION
Anesthesia Pre-Procedure Evaluation    Patient: Estelle Escobar   MRN:     [id number] Gender:   female   Age:    29 year old :      1990        Preoperative Diagnosis: Dichorionic diamniotic twin pregnancy, antepartum [O30.049]   Procedure(s):   SECTION     Past Medical History:   Diagnosis Date     Abnormal Pap smear of cervix 2013      Past Surgical History:   Procedure Laterality Date     ARTHROSCOPIC RECONSTRUCTION ANTERIOR CRUCIATE LIGAMENT       EXTRACTION(S) DENTAL       LEEP TX, CERVICAL  2016          Anesthesia Evaluation     . Pt has had prior anesthetic. Type: General    No history of anesthetic complications          ROS/MED HX    ENT/Pulmonary:  - neg pulmonary ROS    (-) asthma and recent URI   Neurologic:  - neg neurologic ROS    (-) seizures   Cardiovascular:  - neg cardiovascular ROS      (-) hypertension   METS/Exercise Tolerance:  >4 METS   Hematologic:     (+) Anemia, -      Musculoskeletal:         GI/Hepatic:     (+) GERD (mild with pregnancy, none now)       Renal/Genitourinary:  - ROS Renal section negative       Endo:  - neg endo ROS    (-) Type II DM and thyroid disease   Psychiatric:         Infectious Disease:  - neg infectious disease ROS       Malignancy:      - no malignancy   Other:    (+) Possibly pregnant                        PHYSICAL EXAM:   Mental Status/Neuro: A/A/O   Airway: Facies: Feasible  Mallampati: II  Mouth/Opening: Full  TM distance: > 6 cm  Neck ROM: Full   Respiratory: Auscultation: CTAB     Resp. Rate: Normal     Resp. Effort: Normal      CV: Rhythm: Regular  Rate: Age appropriate  Heart: Normal Sounds  Edema: None   Comments:      Dental: Details                  LABS:  CBC:   Lab Results   Component Value Date    WBC 11.7 (H) 2020    WBC 12.1 (H) 2019    HGB 10.6 (L) 2020    HGB 11.7 2019    HCT 31.9 (L) 2020    HCT 37.0 2019     (L) 2020     2019     BMP: No results found for:  "NA, POTASSIUM, CHLORIDE, CO2, BUN, CR, GLC  COAGS: No results found for: PTT, INR, FIBR  POC:   Lab Results   Component Value Date    HCG NEG 08/30/2016     OTHER:   Lab Results   Component Value Date    TSH 1.08 02/08/2019        Preop Vitals    BP Readings from Last 3 Encounters:   01/01/20 139/80   12/26/19 136/88   12/11/19 116/77    Pulse Readings from Last 3 Encounters:   01/01/20 85   12/26/19 84   12/11/19 77      Resp Readings from Last 3 Encounters:   01/01/20 18   05/05/13 18    SpO2 Readings from Last 3 Encounters:   01/01/20 98%   05/05/13 98%      Temp Readings from Last 1 Encounters:   01/01/20 36.9  C (98.5  F) (Oral)    Ht Readings from Last 1 Encounters:   01/01/20 1.676 m (5' 6\")      Wt Readings from Last 1 Encounters:   01/01/20 90.7 kg (200 lb)    Estimated body mass index is 32.28 kg/m  as calculated from the following:    Height as of this encounter: 1.676 m (5' 6\").    Weight as of this encounter: 90.7 kg (200 lb).     LDA:  Peripheral IV 01/01/20 Left Lower forearm (Active)   Site Assessment WDL 1/1/2020  6:00 AM   Line Status Infusing 1/1/2020  6:00 AM   Phlebitis Scale 0-->no symptoms 1/1/2020  6:00 AM   Infiltration Scale 0 1/1/2020  6:00 AM   Number of days: 0        Assessment:   ASA SCORE: 2    H&P: History and physical reviewed and following examination; no interval change.   Smoking Status:  Non-Smoker/Unknown   NPO Status: ELEVATED Aspiration Risk/Unknown     Plan:   Anes. Type:  MAC; Spinal; Peripheral Nerve Block; For Post-op pain in coordination with surgeon     Block Details: TAP; Exparel; Single Shot   Pre-Medication: None   Induction:  N/a   Airway: Native Airway   Access/Monitoring: PIV   Maintenance: N/a     Postop Plan:   Postop Pain: Regional  Postop Sedation/Airway: Not planned  Disposition: Inpatient/Admit     PONV Management:   Adult Risk Factors: Female, Non-Smoker   Prevention: Ondansetron, Dexamethasone     CONSENT: Direct conversation   Plan and risks discussed " with: Patient; Spouse   Blood Products: Consented (ALL Blood Products)       Comments for Plan/Consent:  Discussed risks of anesthesia including nausea, vomiting, headache, itching, bleeding, infection, cardiopulmonary complications, neurologic complications, and serious complications.                   Lore Blancas MD

## 2020-01-01 NOTE — PLAN OF CARE
29w2d di/di twins pt VSS, afebrile with PPROM. Pt was unable to get much rest due to interventions for pre-term labor. Both fetuses have had within normal range heart rates, moderate variability with accelerations (see flowhsheet for further info). Pt is still leaking clear fluid with one episode of scant light pink fluid. Pt is feeling cramping with contractions that radiates to her lower back. NICU consulted with patient, C/S consent signed. Pt had no signs of magnesium toxicity from loading dose and continuous rate. Plan is to continue with antibiotics, magnesium and LR infusions and other pre-term labor interventions. Pt to remain NPO until morning rounding.

## 2020-01-01 NOTE — PROGRESS NOTES
Post Partum Progress Note  POD#1, s/p PLTCS  Subjective:  She is resting comfortably in bed this morning and is without complaint. Pain is improving and well controlled on current medication regimen. Tolerating PO intake.  Lochia scant.  Ambulating independently in room. Lopez removed this morning, has not yet voided. Passing flatus, no BM yet. Denies dizziness or difficulty.  She denies headache, changes in vision, nausea/vomiting, chest pain, shortness of breath, RUQ pain, or worsening edema.  Plans to breastfeed and is pumping for infants in NICU    Objective:  Vitals:    20 0100 20 0130 20 0145 20 0500   BP:  (!) 161/92 135/82 (!) 147/89   BP Location:       Pulse:       Resp: 16   16   Temp: 98.1  F (36.7  C)   98.1  F (36.7  C)   TempSrc: Oral   Oral   SpO2: 100%   100%   Weight:       Height:         General: NAD. A&Ox3.  CV: RRR, +S1S2  Pulm: CTAB. Normal respiratory effort.  Abd: Soft, non-tender, non-distended. Fundus is firm and below the umbilicus.    Incision with bandage overlying, no shadowing  Ext: Trace edema, SCDs in place    Hemoglobin   Date Value Ref Range Status   2020 7.9 (L) 11.7 - 15.7 g/dL Final   ]   Assessment/Plan:  Estelle Escobar is a 29 year old  female who is POD#1 s/p PLTCS for PTL with di-di twins and breech/breech presentation.    #Gestational HTN  -Patient meets criteria for gestational HTN based on BPs > 140/90 greater than 4 hours apart.   -HELLP labs collected  WNL, UPC 0.28. Repeat if BP persistently elevated this a.m.  -Will continue to monitor for signs and symptoms of preeclampsia.   -Isolated non-sustained severe range BP overnight    - Encourage routine post-operative goals including ambulation   - PNC: Rh pos. Rubella immune. No intervention indicated.  - Pain: controlled on oral medications  - Heme: Hgb 10.6> > 7.9. Patient asymptomatic, BP mild range, no tachycardia. Monitor for symptoms. Will discharge home with iron.  -  GI: continue anti-emetics and stool softeners as needed.  - : s/p kingsley removal this morning. Follow-up TOV  - Infants: Stable in NICU  - Feeding: Plans on breastfeeding, pumping  - BC: To be discussed    Discharge to home when meeting postoperative goals, likely on POD#3.    Alicia Myhre, DO  Obstetrics and Gyncology, PGY-3  January 2, 2020, 7:31 AM    Appreciate note by Dr. Myhre. Patient has been seen and examined by me separate from the resident, agree with above note. Plan for discharge home POD#3 Will do IV iron for Hgb >8.      Alina Gama MD  3:47 PM

## 2020-01-01 NOTE — ANESTHESIA PROCEDURE NOTES
Spinal/LP Procedure Note    Spinal Block  Staff:     Anesthesiologist:  Lore Crump MD  Location: In OR BEFORE Induction and OB  Procedure Start/Stop Times:     patient identified, IV checked, site marked, risks and benefits discussed, informed consent, monitors and equipment checked, pre-op evaluation, at physician/surgeon's request and post-op pain management      Correct Patient: Yes      Correct Position: Yes      Correct Site: Yes      Correct Procedure: Yes      Correct Laterality:  Yes and N/A    Site Marked:  Yes and N/A  Procedure:     Procedure:  Intrathecal    ASA:  2 and Emergent    Diagnosis:   labor breech di-di twins    Position:  Sitting    Sterile Prep: chloraprep      Insertion site:  L4-5    Approach:  Midline    Needle Type:  Antoni    Needle gauge (G):  25    Local Skin Infiltration:  1% lidocaine    amount (ml):  3    Needle Length (in):  3.5    Introducer used: Yes      Introducer gauge:  20 G    Attempts:  1    Redirects:  0    CSF:  Clear    Paresthesias:  No  Assessment/Narrative:     Sensory Level:  T5

## 2020-01-01 NOTE — ANESTHESIA CARE TRANSFER NOTE
Patient: Estelle Escobar    Procedure(s):   SECTION    Diagnosis: Dichorionic diamniotic twin pregnancy, antepartum [O30.049]  Diagnosis Additional Information: No value filed.    Anesthesia Type:   MAC, Spinal, Peripheral Nerve Block, For Post-op pain in coordination with surgeon     Note:  Airway :Room Air  Patient transferred to:PACU  Comments: Comfortable, stable vital signs, report to OB, RNHandoff Report: Identifed the Patient, Identified the Reponsible Provider, Reviewed the pertinent medical history, Discussed the surgical course, Reviewed Intra-OP anesthesia mangement and issues during anesthesia, Set expectations for post-procedure period and Allowed opportunity for questions and acknowledgement of understanding      Vitals: (Last set prior to Anesthesia Care Transfer)    CRNA VITALS  2020 1019 - 2020 1056      2020             Pulse:  81    SpO2:  98 %                Electronically Signed By: CATHIE Farias CRNA  2020  10:56 AM

## 2020-01-01 NOTE — ANESTHESIA PROCEDURE NOTES
Peripheral Nerve Block Procedure Note    Staff:     Anesthesiologist:  Lore Crump MD  Location: OB  Procedure Start/Stop TImes:     patient identified, IV checked, site marked, risks and benefits discussed, informed consent, monitors and equipment checked, pre-op evaluation, at physician/surgeon's request and post-op pain management      Correct Patient: Yes      Correct Position: Yes      Correct Site: Yes      Correct Procedure: Yes      Correct Laterality:  Yes    Site Marked:  Yes  Procedure details:     Procedure:  TAP    ASA:  2 and Emergent    Diagnosis:  Post  pain    Laterality:  Bilateral    Position:  Supine    Sterile Prep: chloraprep      Needle:  Short bevel    Needle gauge:  21    Needle length (inches):  4    Ultrasound: Yes      Ultrasound used to identify targeted nerve, plexus, or vascular structure and placed a needle adjacent to it      Permanent Image entered into patiient's record      Abnormal pain on injection: No      Blood Aspirated: No      Paresthesias:  No    Bleeding at site: No      Bolus via:  Needle    Infusion Method:  Single Shot    Complications:  None

## 2020-01-01 NOTE — DISCHARGE SUMMARY
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  PPROM  Breech presentation          Discharge Diagnosis:   Same as above   labor  Gestational hypertension  Acute blood loss anemia            Procedures:     Procedure(s): Electronic fetal monitoring  Magnesium for fetal neuroprotection  Betamethasone for fetal lung maturity  PLTCS  Spinal                Medications Prior to Admission:     Medications Prior to Admission   Medication Sig Dispense Refill Last Dose     Prenatal MV-Min-Fe Fum-FA-DHA (PRENATAL 1 PO)    2019 at Unknown time     [DISCONTINUED] aspirin (ASA) 81 MG chewable tablet Take 1 tablet (81 mg) by mouth daily 90 tablet 3 2019 at Unknown time             Discharge Medications:        Review of your medicines      START taking      Dose / Directions   acetaminophen 325 MG tablet  Commonly known as:  TYLENOL  Used for:  S/P  section      Dose:  650 mg  Take 2 tablets (650 mg) by mouth every 6 hours as needed for mild pain Start after Delivery.  Quantity:  100 tablet  Refills:  0     ferrous sulfate 325 (65 Fe) MG tablet  Commonly known as:  FEROSUL  Used for:  Anemia due to blood loss, acute      Dose:  325 mg  Take 1 tablet (325 mg) by mouth daily (with breakfast)  Quantity:  60 tablet  Refills:  0     ibuprofen 600 MG tablet  Commonly known as:  ADVIL/MOTRIN  Used for:  S/P  section      Dose:  600 mg  Take 1 tablet (600 mg) by mouth every 6 hours as needed for moderate pain Start after delivery  Quantity:  60 tablet  Refills:  0     oxyCODONE 5 MG tablet  Commonly known as:  ROXICODONE  Used for:  S/P  section      Dose:  5 mg  Take 1 tablet (5 mg)  by mouth every 6 hours as needed for pain  Quantity:  3 tablet  Refills:  0     senna-docusate 8.6-50 MG tablet  Commonly known as:  SENOKOT-S/PERICOLACE  Used for:  S/P  section      Dose:  1 tablet  Take 1 tablet by mouth daily Start after delivery.  Quantity:  100 tablet  Refills:  0        CONTINUE these medicines which have NOT CHANGED      Dose / Directions   PRENATAL 1 PO      Refills:  0        STOP taking    aspirin 81 MG chewable tablet  Commonly known as:  ASA              Where to get your medicines      These medications were sent to [facility] [place], [place] - [address]  [address] [facility] [address] [address]    Phone:  [phone]     acetaminophen 325 MG tablet    ferrous sulfate 325 (65 Fe) MG tablet    ibuprofen 600 MG tablet    senna-docusate 8.6-50 MG tablet     Some of these will need a paper prescription and others can be bought over the counter. Ask your nurse if you have questions.    Bring a paper prescription for each of these medications    oxyCODONE 5 MG tablet               Consultations:   NICU  Social Work  Lactation          Brief History of Admission and Antepartum Course:   29 year old now  who presented at 29w2d by LMP c/w first trimester US for PPROM. She was started on latency antibiotics and given a course of BMZ for fetal lung maturity. Given concern for worsening contractions, she was started on magnesium for fetal neuroprotection. Routine PPROM labs were obtained and unremarkable. Shortly after admission patient began experiencing increase in painful contractions. She made cervical change to 4cm. Recommendation for  section was made.          Intraoperative Course:   Intraoperative course was unremarkable.  Please see operative note for full details.     EBL 786mL    Findings:   1. Clear amniotic fluid of Twins A and B  2. Twin A Liveborn male infant in breech presentation. Twin A: Apgars 8 at 1 minute & 6 at 5 minutes & 8  at 10 minutes. Weight 1.32kg. Arterial cord pH 7.28, base deficit 3.5. Venous cord pH 7.34, base deficit 3.6  3.Twin B liveborn male infant in breech: Apgars and weight pending. Apgars 8 at 1 minute & 5 at 5 minutes & 8 at 10 minutes. Weight 1.49kg. Arterial cord pH 7.29, base deficit 4.7. Venous cord pH 7.37, base deficit 3.3.  4. Normal uterus, fallopian tubes, and ovaries.         Postpartum Hospital Course:   The patient's postpartum course was notable for elevated mild range blood pressure, meeting criteria for gestational hypertension, with normal HELLP labs. She had one non-sustained severe range BP on the morning of POD#1, but was normotensive the remainder of her hospitalization. On discharge, her pain was well controlled. Vaginal bleeding is similar to peak menstrual flow.  Voiding without difficulty.  Ambulating well and tolerating a normal diet.  No fever.  Breastfeeding well.  Infants are stable.  She was discharged on post-partum day #3.    Post-partum hemoglobin: 7.9    Contraception: 6wk IUD    Rhogam was not indicated          Discharge Instructions and Follow-Up:     Discharge diet: Regular   Discharge activity: Lifting restricted to 15 pounds for 6 weeks  No lifting, driving, or strenuous exercise for 6 week(s)  No driving or operating machinery while on narcotic analgesics  Pelvic rest: abstain from intercourse and do not use tampons for 6 week(s)   Discharge follow-up: Follow up with OB provider in 1 week for a blood pressure check and in 6 weeks for routine postpartum visit   Wound care: Steri-strips off in 10 days           Discharge Disposition:     Discharged to home        Discussed with Dr. Manuel.    Quan Gold MD MPH  OB/Gyn PGY-1  01/04/20 8:15 AM    [facility] staff:  Appreciate note by Dr. Gold.  I have seen and examined the patient without the resident. I have reviewed, edited, and agree with the note.        Ness Manuel MD, FACOG

## 2020-01-01 NOTE — PLAN OF CARE
Data: Patient presented to [facility] at 0100.   Reason for maternal/fetal assessment per patient is Rule Out Labor  .  Patient is a . Prenatal record reviewed.      OB History    Para Term  AB Living   1 0 0 0 0 0   SAB TAB Ectopic Multiple Live Births   0 0 0 0 0      # Outcome Date GA Lbr Amilcar/2nd Weight Sex Delivery Anes PTL Lv   1 Current            . Medical history:   Past Medical History:   Diagnosis Date     Abnormal Pap smear of cervix 2013   . Gestational Age 29w2d. VSS. Fetal movement present. Patient denies cramping, backache, vaginal discharge, pelvic pressure, UTI symptoms, GI problems, bloody show, vaginal bleeding, edema, headache, visual disturbances, epigastric or URQ pain, abdominal pain. Support person Derek (spouse) present.  Action: Verbal consent for EFM. Triage assessment completed. EFM applied. Uterine assessment shows that pt is al q2-7 minutes. Fetal assessment: Presumed adequate fetal oxygenation documented (see flow record).   Response: Dr. Santana informed of pt arrival. Plan per provider is to admit pt for pre-term labor. Patient verbalized agreement with plan. Patient transferred to room 479 ambulatory, oriented to room and call light.

## 2020-01-01 NOTE — PROVIDER NOTIFICATION
01/01/20 0329   Provider Notification   Provider Name/Title Dr. Gama    Method of Notification At Bedside   provider at bedside doing initial assessment and speaking to pt and spouse about current plan.

## 2020-01-01 NOTE — PLAN OF CARE
Patient arrived to [facility] unit via zoom cart at 1330,with belongings, accompanied by family. Received report from REGGIE Zimmerman and checked bands. Unit and room orientation completd. Call light given; no concerns present at this time. Continue with plan of care.

## 2020-01-01 NOTE — H&P
History and Physical     Estelle Escobar MRN# [id number]   YOB: 1990 Age: 29 year old      Date of Admission: 2020    Primary care provider: No Ref-Primary, Physician      Assessment and Plan:     29 year old  at 29w2d by LMP c/w first trimester US, here for PPROM. Pregnancy is notable for Carrier of AR conditions, normal 1st tri screen, sciatica. Regularly al on the monitor on presentation. Afebrile.     # PPROM  - Labs: CBC, T&S, RPR, Wet prep, GBS, G/C, UA/UCx   - Latency antibiotics  - Will order MFM comp US  - Will monitor for fevers, fundal tenderness, foul smelling vaginal discharge.    # PNC  - Rh pos, Rubella immune, GCT wnl, GBS unknown  - Other prenatal labs wnl  - Imaging: breech/breech  - S/p flu, Tdap vaccines     # FWB:   Cat 1 tracing, reactive x2  - Continuous Fetal Monitoring  - BMZ for fetal lung maturity  - Magnesium for neuroprotection if delivery imminent  - NICU + SW consult ordered  - Intrauterine resuscitative measures prn      Patient discussed with Dr. Gama   Appreciate note by Dr. Santana. Patient has been seen and examined by me separate from the resident, agree with above note. Patient with PPROM di/di twins @29w2d. Now with contractions every 3-6 min. Will give indocin through BMZ window.     Alina Gama MD  4:09 AM       HPI:   Estelle Escobar is a 29 year old  with Di Di twin gestation at 29w2d by LMP c/w first trimester US who presents today for evaluation of LOF of fluid. After laying down for bed tonight, felt like she was urinating on herself and the fluid just kept coming. No odor to it. Notes some yellowish tinge, but generally clear. Denies vaginal bleeding. Not feeling contractions. Good FM+.    Pregnancy notable for:   - Di-Di twins, 6.2% discordance  - Carrier of AR conditions, normal 1st tri screen  - Sciatica      OB History:    OB History    Para Term  AB Living   1 0 0 0 0 0   SAB TAB Ectopic  Multiple Live Births   0 0 0 0 0      # Outcome Date GA Lbr Amilcar/2nd Weight Sex Delivery Anes PTL Lv   1 Current                 Prenatal Lab Results:  Lab Results   Component Value Date    ABO B 08/05/2019    RH Pos 08/05/2019    AS Neg 08/05/2019    HEPBANG Nonreactive 08/05/2019    CHPCRT Negative 09/17/2019    GCPCRT Negative 09/17/2019    TREPAB Negative 11/24/2015    HGB 11.7 12/11/2019       GBS Status:   No results found for: GBS             Past Medical History:     Past Medical History:   Diagnosis Date     Abnormal Pap smear of cervix 6/7/2013             Past Surgical History:     Past Surgical History:   Procedure Laterality Date     ARTHROSCOPIC RECONSTRUCTION ANTERIOR CRUCIATE LIGAMENT       EXTRACTION(S) DENTAL       LEEP TX, CERVICAL  8/30/2016             Social History:     Social History     Tobacco Use     Smoking status: Never Smoker     Smokeless tobacco: Never Used   Substance Use Topics     Alcohol use: Yes     Alcohol/week: 0.0 - 1.0 standard drinks             Family History:     Family History   Problem Relation Age of Onset     Cancer Maternal Grandfather         basal cell      Skin Cancer Maternal Grandfather      Diabetes Paternal Grandmother      Diabetes Paternal Grandfather              Immunizations:     Immunization History   Administered Date(s) Administered     DTAP (<7y) 09/03/2008     Influenza (IIV3) PF 10/21/2014, 11/25/2015     Influenza Vaccine IM > 6 months Valent IIV4 11/06/2018, 10/15/2019     TDAP Vaccine (Boostrix) 12/26/2019            Allergies:   No Known Allergies          Medications:     Medications Prior to Admission   Medication Sig Dispense Refill Last Dose     aspirin (ASA) 81 MG chewable tablet Take 1 tablet (81 mg) by mouth daily 90 tablet 3 12/31/2019 at Unknown time     Prenatal MV-Min-Fe Fum-FA-DHA (PRENATAL 1 PO)    12/31/2019 at Unknown time             Review of Systems & Physical Exam:   The Review of Systems is negative other than noted in the HPI  "     /79 (BP Location: Right arm)   Pulse 84   Temp 98.3  F (36.8  C) (Oral)   Resp 16   Ht 1.676 m (5' 6\")   Wt 90.7 kg (200 lb)   LMP 06/10/2019   BMI 32.28 kg/m    Gen: NAD  CV: RRR, nl S1/S2, no murmurs/clicks/gallops  Lungs: CTAB, non-labored breathing  Abd: Gravid, non-tender, non-distended  Ext: 1+ peripheral extremity edema    SSE: Copious clear amniotic fluid, cervix 1cm dilated  Presentation: breech/breech by BSUS.    FHT:  Monitoring External  A FHT: Baseline 140 bpm; moderate variability; 10x10 accels present; no decelerations    B FHT: Baseline 130s bpm; moderate variability; 10x10 accels present; no decelerations    TOCO 2 contractions in 10 minutes           Data:     ROM+ positive      Placido Santana MD  Obstetrics & Gynecology, PGY3    "

## 2020-01-01 NOTE — ANESTHESIA POSTPROCEDURE EVALUATION
Anesthesia POST Procedure Evaluation    Patient: Estelle Escobar   MRN:     [id number] Gender:   female   Age:    29 year old :      1990        Preoperative Diagnosis: Dichorionic diamniotic twin pregnancy, antepartum [O30.049]   Procedure(s):   SECTION   Postop Comments: No value filed.       Anesthesia Type:  Not documented  MAC, Spinal, Peripheral Nerve Block, For Post-op pain in coordination with surgeon    Reportable Event: NO     PAIN: Uncomplicated   Sign Out status: Comfortable, Well controlled pain     PONV: No PONV   Sign Out status:  No Nausea or Vomiting     Neuro/Psych: Uneventful perioperative course   Sign Out Status: Preoperative baseline; Age appropriate mentation     Airway/Resp.: Uneventful perioperative course   Sign Out Status: Non labored breathing, age appropriate RR; Resp. Status within EXPECTED Parameters     CV: Uneventful perioperative course   Sign Out status: Appropriate BP and perfusion indices; Appropriate HR/Rhythm     Disposition:   Sign Out in:  Labor and Delivery  Disposition:  Labor and Delivery  Recovery Course: Uneventful  Follow-Up: Not required           Last Anesthesia Record Vitals:  CRNA VITALS  2020 1019 - 2020 1119      2020             NIBP:  136/86     PACU initial    Pulse:  78          Last PACU Vitals:  Vitals Value Taken Time   /90 2020  4:30 PM   Temp 36.9  C (98.4  F) 2020  3:30 PM   Pulse 89 2020  4:30 PM   Resp 16 2020  4:30 PM   SpO2 100 % 2020  4:30 PM   Temp src     NIBP     Pulse     SpO2     Resp     Temp     Ht Rate     Temp 2           Electronically Signed By: Lore Blancas MD, 2020, 4:36 PM

## 2020-01-01 NOTE — ED NOTES
Pt arrived to ED with complaint of water broke .  Pt reports 29 weeks pregnant.   Pt is having contractions No.   Pt feels urge to push No.   Pt reports water broke Yes.   Report was called and pt was transferred to L&D Yes.

## 2020-01-01 NOTE — OP NOTE
[facility]  Surgery Date:   2020     Surgeon:         MD Alina Rasheed MD     Assistants:        Alicia Myhre, DO, PGY-3                          Pierre Gold MD PGY-1    Gene Chi, MS3     Pre-op Diagnosis:         1. Intrauterine pregnancy at 29w2d                                         2. Di-Di Twins                                         3. PPROM                                          4.  labor     Post-op Diagnosis:       1. Same                                       2. Twin A: Liveborn male infant. Twin B: Liveborn male infant     Procedure:        Primary low-transverse  section with two layer uterine closure via pfannenstiel incision     Anesthesia:      Spinal     EBL:     786 mL  IVF:       1200 mL crystalloid  UOP:    750 mL clear yellow urine at the end of the case     Drains: Lopez Catheter   Specimens:      placentas     Complications:             None apparent     Findings:   1. Clear amniotic fluid of Twins A and B  2. Twin A Liveborn male infant in breech presentation. Twin A: Apgars 8 at 1 minute & 6 at 5 minutes & 8 at 10 minutes. Weight 1320 g. Arterial cord pH 7.28, base deficit 3.5. Venous cord pH 7.34, base deficit 3.6  3.Twin B liveborn male infant in breech: Apgars 8 at 1 minute & 5 at 5 minutes & 8 at 10 minutes. Weight 1490 kg. Arterial cord pH 7.29, base deficit 4.7. Venous cord pH 7.37, base deficit 3.3.  4. Normal uterus, fallopian tubes, and ovaries.      Disposition:      Transferred in stable condition to PACU    Indications:   Estelle Escobar is a 29 year old  at 29w2d admitted for leaking of fluid and  contractions and PPROM of fetus A was diagnosed.  Upon admission a single dose of betamethasone was administered and latency antibiotics were started. She received a 6.0gm load of magnesium sulfate for fetal neuro protection. Several hours after admission she began to painfully contract and  made cervical change to 4cm. Recommendation was made to proceed with  section given breech/breech presentation.  The risks, benefits, and alternatives of  section were discussed with the patient, and she agreed to proceed. Signed consent was obtained.     Procedure Details:   The patient was brought to the OR, where adequate spinal anesthesia was administered.  She was placed in the dorsal supine position with a slight leftward tilt. She was prepped and draped in the usual sterile fashion. A surgical time out was performed.  She received a single dose of IV ancef for surgical prophylaxis and had received PO azithromycin for latency earlier in the day.    A low transverse skin incision was made with the scalpel, and carried down to the underlying fascia with sharp and blunt dissection. The fascia was incised in the midline, and the incision was extended laterally with the Medina scissors. The superior aspect of the fascia was grasped with the Kocher clamps and dissected off of the underlying rectus muscles with blunt and sharp dissection. Attention was then turned to the inferior aspect of the fascia, which was similarly dissected off of the underlying rectus muscles. The rectus muscles were  in the midline, and the peritoneum was grasped with two stefano clamps and a Metzenbaum scissors was used to enter, the opening was extended with digital pressure. The bladder blade was placed.     The vesicouterine peritoneum was incised in the midline, and the incision was extended laterally with the Metzenbaum scissors. A bladder flap was created digitally and the bladder blade was replaced. A transverse hysterotomy was made with the scalpel in the lower uterine segment, and the incision was extended with digital pressure. Twin A was noted to be in tim breech position, and was delivered atraumatically via breech maneuvers. No nuchal cord was noted. The cord was doubly clamped with straight clamps and  cut after 1 minute, and the infant was handed off to the awaiting NICU team. A segment of cord was cut. Twin B was noted to be in tim breech position. The fetal foot was grasped and AROM performed. Twin B was delivered atraumatically via breech maneuvers. No nuchal cord was noted. The cord was doubly clamped with curved clamps and cut after 1 minute, and the infant was handed off to the awaiting NICU team. A segment of cord was cut. The placentas delivered with gentle traction on the umbilical cord and uterine massage. The uterus was cleared of all clots and debris. Uterine tone was noted to be firm with pitocin given through the running IV and uterine massage.      The hysterotomy was closed with a running locked suture of 0 Vicryl.  The hysterotomy was then imbricated using an 0 Monocryl suture. The hysterotomy was noted to be hemostatic. The pericolic gutters were irrigated and cleared of all clots and debris. The hysterotomy was reexamined and noted to be hemostatic. The fascia and rectus muscles were examined and areas of oozing were controlled with electrocautery. Surgifoam was placed over inferior aspect of rectus muscles. The fascia was closed with two looped PDS sutures starting from each fascial angle and meeting in the midline. The subcutaneous tissue was irrigated and areas of oozing were controlled with electrocautery. The subcutaneous tissue was less than 2 cm in thickness, and was therefore not closed. The skin was closed with 4-0 monocryl and covered with a sterile dressing.    All sponge, needle, and instrument counts were correct. The patient tolerated the procedure well, and was transferred to recovery in stable condition. Dr. Lainez was present and scrubbed for the entirety of the procedure.     Alicia Myhre,   OB/GYN Resident, PGY-3  1/1/2020, 2:47 PM    I participated in all aspects of Estellekian Escobar's case with Dr. Myhre on 1/1/2020 and agree with the operative details in this note with  edits by me.     Lourdes Lainez MD MPH

## 2020-01-01 NOTE — PLAN OF CARE
VSS. Postpartum checks WDL. Pain managed with Toradol, Tylenol, and Taps block. Tolerating regular. Urine output via kingsley WDL. Dangled at bedside and ambulated to bathroom at 1715. Pt tolerated well then reported feeling dizzy and ringing in the ears upon return to bed. Dizziness and ringing in ears resolved after sitting in bed for a few minutes. Pumping and doing hand expression. Visiting babies in NICU. Continue cares.

## 2020-01-01 NOTE — PROVIDER NOTIFICATION
01/01/20 0205   Provider Notification   Provider Name/Title Dr. CARMEN Santana   Method of Notification At Bedside   Notification Reason Status Update;Lab/Diagnostic Study   Provider at bedside updating pt on results. Due to rom plus being positive and pt al, pt is being admitted as inpatient. Plan is to move pt to room 479 perform SVE, start IV, draw labs and receive vaginal swabs. Also to start IV fluids, antibiotics and betamethosone.

## 2020-01-01 NOTE — PROVIDER NOTIFICATION
01/01/20 0335   Provider Notification   Provider Name/Title Dr. Gama    Method of Notification At Bedside   provider at bedside going over c/s consent and blood transfusion consent with pt and spouse. All questions and concerns were addressed.

## 2020-01-01 NOTE — PROVIDER NOTIFICATION
01/01/20 0522   Provider Notification   Provider Name/Title Dr. Santana   Method of Notification At Bedside   Notification Reason Pain   Provider at bedside addressing pts pain with contractions. Plan is to start IV magnesium and continue to monitor pt closely.

## 2020-01-01 NOTE — PROVIDER NOTIFICATION
01/01/20 0521   Provider Notification   Provider Name/Title [place] NNP   Method of Notification Phone   Notification Reason Other (Comment)     Patient becoming more uncomfortable with contractions. NNP called for NICU consult.

## 2020-01-01 NOTE — CONSULTS
Neonatology Antepartum Counseling Consult    I was asked to provide antepartum counseling for Estelle Escobar at the request of Dr. Gama secondary to PPROM and PTL. Ms. Escobar is currently 29 weeks and has a hx significant for di di twin gestation. Betamethasone was administered on  and planned for . Ms. Escobar, accompanied by her , was counseled on the expected hospital course, potential risks, and outcomes associated with an infant born at approximately 29 weeks gestation. The counseling included: morbidity, mortality, initial delivery room stabilization, respiratory course, lung development, patent ductus arteriosus, retinopathy of prematurity, hyperbilirubinemia, hemodynamic support, infection (including NEC), intraventricular hemorrhage, nutrition, growth and development, and long term outcomes. Please feel free to call with any additional questions or concerns.      Floor Time (min): 5  Face to Face Time (min): 15  Total Time (minutes): 20  More than 50% of my time was spent in direct, face to face, antepartum counseling with the above patient.    Emerita Morales PA-C 6:04 AM 2020   Advanced Practice Provider  [facility]

## 2020-01-01 NOTE — BRIEF OP NOTE
[facility]   Brief Operative Note     Surgery Date: 2020    Surgeon:  Lourdes Lainez MD    Assistants:  Alicia Myhre, DO, PGY-3    Pierre Gold MD PGY-1    Pre-op Diagnosis:  1. Intrauterine pregnancy at 29w2d     2. Di-Di Twins     3. PPROM      4.  labor    Post-op Diagnosis:  1. Same      2.Twin A: Liveborn male infant. Twin B: Liveborn male infant    Procedure:  Primary low-transverse  section with two layer uterine closure via pfannenstiel incision    Anesthesia: spinal    EBL:  786 mL    IVF:  1200 mL crystalloid    UOP:  750 mL clear yellow urine at the end of the case    Drains: Lopez Catheter     Specimens:  placentas    Complications: None apparent    Findings:   1. Clear amniotic fluid of Twins A and B  2. Twin ALiveborn male infant in breech presentation. Twin A: Apgars and weight pending. Twin B liveborn male infant in breech: Apgars and weight pending   3. Normal uterus, fallopian tubes, and ovaries.     Disposition:  Transferred in stable condition to PAR Alicia Myhre, DO  OB/GYN Resident, PGY-3  2020 10:33 AM

## 2020-01-02 LAB
C TRACH DNA SPEC QL NAA+PROBE: NEGATIVE
HGB BLD-MCNC: 7.9 G/DL (ref 11.7–15.7)
N GONORRHOEA DNA SPEC QL NAA+PROBE: NEGATIVE
SPECIMEN SOURCE: NORMAL
SPECIMEN SOURCE: NORMAL

## 2020-01-02 PROCEDURE — 25000128 H RX IP 250 OP 636: Performed by: OBSTETRICS & GYNECOLOGY

## 2020-01-02 PROCEDURE — 85018 HEMOGLOBIN: CPT | Performed by: OBSTETRICS & GYNECOLOGY

## 2020-01-02 PROCEDURE — 25800030 ZZH RX IP 258 OP 636: Performed by: OBSTETRICS & GYNECOLOGY

## 2020-01-02 PROCEDURE — 25000132 ZZH RX MED GY IP 250 OP 250 PS 637: Performed by: STUDENT IN AN ORGANIZED HEALTH CARE EDUCATION/TRAINING PROGRAM

## 2020-01-02 PROCEDURE — 36415 COLL VENOUS BLD VENIPUNCTURE: CPT | Performed by: OBSTETRICS & GYNECOLOGY

## 2020-01-02 PROCEDURE — 12000001 ZZH R&B MED SURG/OB UMMC

## 2020-01-02 RX ORDER — METHYLPREDNISOLONE SODIUM SUCCINATE 125 MG/2ML
125 INJECTION, POWDER, LYOPHILIZED, FOR SOLUTION INTRAMUSCULAR; INTRAVENOUS
Status: DISCONTINUED | OUTPATIENT
Start: 2020-01-02 | End: 2020-01-04 | Stop reason: HOSPADM

## 2020-01-02 RX ORDER — ACETAMINOPHEN 325 MG/1
975 TABLET ORAL EVERY 8 HOURS
Status: DISCONTINUED | OUTPATIENT
Start: 2020-01-02 | End: 2020-01-04 | Stop reason: HOSPADM

## 2020-01-02 RX ORDER — AMOXICILLIN 250 MG
1 CAPSULE ORAL DAILY
Qty: 100 TABLET | Refills: 0 | Status: SHIPPED | OUTPATIENT
Start: 2020-01-02 | End: 2020-02-11

## 2020-01-02 RX ORDER — DIPHENHYDRAMINE HYDROCHLORIDE 50 MG/ML
50 INJECTION INTRAMUSCULAR; INTRAVENOUS
Status: DISCONTINUED | OUTPATIENT
Start: 2020-01-02 | End: 2020-01-04 | Stop reason: HOSPADM

## 2020-01-02 RX ORDER — IBUPROFEN 600 MG/1
600 TABLET, FILM COATED ORAL EVERY 6 HOURS
Status: DISCONTINUED | OUTPATIENT
Start: 2020-01-02 | End: 2020-01-04 | Stop reason: HOSPADM

## 2020-01-02 RX ORDER — ACETAMINOPHEN 325 MG/1
650 TABLET ORAL EVERY 6 HOURS PRN
Qty: 100 TABLET | Refills: 0 | Status: SHIPPED | OUTPATIENT
Start: 2020-01-02 | End: 2022-03-23

## 2020-01-02 RX ORDER — FERROUS SULFATE 325(65) MG
325 TABLET ORAL
Qty: 60 TABLET | Refills: 0 | Status: SHIPPED | OUTPATIENT
Start: 2020-01-02 | End: 2021-12-17

## 2020-01-02 RX ORDER — IBUPROFEN 600 MG/1
600 TABLET, FILM COATED ORAL EVERY 6 HOURS PRN
Qty: 60 TABLET | Refills: 0 | Status: SHIPPED | OUTPATIENT
Start: 2020-01-02 | End: 2020-02-11

## 2020-01-02 RX ADMIN — IRON SUCROSE 300 MG: 20 INJECTION, SOLUTION INTRAVENOUS at 18:41

## 2020-01-02 RX ADMIN — ACETAMINOPHEN 975 MG: 325 TABLET, FILM COATED ORAL at 12:11

## 2020-01-02 RX ADMIN — SENNOSIDES AND DOCUSATE SODIUM 1 TABLET: 8.6; 5 TABLET ORAL at 19:46

## 2020-01-02 RX ADMIN — KETOROLAC TROMETHAMINE 30 MG: 30 INJECTION, SOLUTION INTRAMUSCULAR at 04:11

## 2020-01-02 RX ADMIN — IBUPROFEN 600 MG: 600 TABLET ORAL at 18:53

## 2020-01-02 RX ADMIN — ACETAMINOPHEN 975 MG: 325 TABLET, FILM COATED ORAL at 04:59

## 2020-01-02 RX ADMIN — IBUPROFEN 600 MG: 600 TABLET ORAL at 12:12

## 2020-01-02 RX ADMIN — ACETAMINOPHEN 975 MG: 325 TABLET, FILM COATED ORAL at 19:46

## 2020-01-02 RX ADMIN — SENNOSIDES AND DOCUSATE SODIUM 1 TABLET: 8.6; 5 TABLET ORAL at 08:31

## 2020-01-02 NOTE — PLAN OF CARE
Stable postpartum. Incision pain managed with medication. Ambulating in room without difficulty. Independent with self cares. Visits babies in the NICU.

## 2020-01-02 NOTE — CONSULTS
[facility]  MATERNAL CHILD HEALTH   INITIAL NICU PSYCHOSOCIAL ASSESSMENT      DATA:      Presenting Information: Mom is a  who delivered twin infant males on 2020. Babies are presently in the NICU, as they were born as a result of PPROM at 29w3d gestation.     Living Situation: Mom, Estelle, reports that she lives with her , Jordan in [facility]. ([facility]     Family Constellation: Estelle and Jordan are legally  and this is their first child.     Social Support: Estelle reports that she and Jordan have good support from their parents. Maternal grandmother lives in [place] during the salcedo but plans to fly home to [place] in mid-February to spend time with the family. Paternal grandfather works long hours so may not be very involved during the postpartum period. Paternal grandparents are both medical professionals and live in the [place] area, so Estelle anticipates they will be very helpful in helping parents navigate the medical complexities of their babies' situation.     In addition, Estelle reports that she and Jordan have many friends who are supportive.     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     Insurance: [employer]     Source of Financial Support: Parental employment      Mental Health History: Per chart review, no mental health concerns.      History of Postpartum Mood Disorders: NA, as this is Estelle's first postpartum period.     Chemical Health History: None indicated.     Legal/Child Protection Involvement: None indicated.     Community Resources//Baby Supplies: No needs identified.     INTERVENTION:        Completed chart review and collaborated with the multidisciplinary team, primarily  ? Kadi Mom's bedside RN  ? Mally babies' bedside RN    Met  with Mom alone in the setting of her postpartum inpatient hospital room.     Provided introduction to Maternal Child Health  role and scope of practice     Orientation to the NICU (parking, lodging/NICU boarding rooms, rounding teams, visitation, NICU badges, meals, primary nurses)     Conducted Psychosocial Assessment     Assessed Chemical Health History and Current Symptoms     Assessed Mental Health History and Current Symptoms     Identified stressors, barriers and family concerns, primarily  ? Anticipated parking and commuting stresses - provided monthly parking pass and offered ongoing support for navigating the commuting stresses.    Provided Preemie baby book for tracking babies' progress in the NICU.    Provided supportive counseling. Active empathetic listening and validation.     Provided psychoeducation on  mood and anxiety disorders, assessed for any current symptoms or history    Provided community resource postcard for Postpartum Support [place] ([facility]      ASSESSMENT:      Assessment of Support System: stable and involved     Family and parent/infant interactions: Mom has visited babies several times today and has already held them. She expresses great excitement and dedication toward being involved in their cares.     Motivation/Ability to Access Services: Independent in accessing services     Level of engagement with SW: Engaged and appropriate.      Strengths: Caring family, willingness to ask for help, strong support network, connection to community multiples resources, financial stability.     Vulnerabilities: First-time parents, unexpected NICU stay     Identified Barriers: None at this time.     Family s understanding of baby s medical situation: Good grasp of the medical situation.     Lodging Plan: Parents will commute from [facility] until a private room becomes appropriate and available.     Family's interest in transferring to OSH as appropriate: Not  assessed.     PLAN:      SW will continue to follow throughout pt's Maternal-Child Health Journey as needs arise. SW will continue to collaborate with the multidisciplinary team.     PELON Mcdonald, [facility]   Social Worker  Maternal Child Health  [facility]  Direct: [phone]  Pager: [phone]

## 2020-01-02 NOTE — LACTATION NOTE
"D:  Estelle states she is normally in good health, takes no medications, and has no history of breast/chest surgery or trauma.  The twins are her first children. She has already started to pump, getting puddles to up to 15ml. She has a new Pump in Style she purchased with HSA money that was going to .  I:  I gave her a folder of introductory materials, reviewed physiology of colostrum and milk production, pumping guidelines, and I gave her a log and encouraged her to use it.  I explained how to access the videos \"Hand Expression\" and \"Maximizing Milk Production\"; as well as other helpful books and websites.  We discussed hands-on pumping techniques and usefulness of a hands-free pumping bra.  We discussed skin to skin holding and how to reach breastfeeding goals.  We talked about medications during breastfeeding.  She verbalized understanding. She will check hospital grade pump coverage through her insurance company.    A:  Mom has information she needs to initiate her supply.   P:  Will continue to provide lactation support.  Nancy Self, RNC, IBCLC              "

## 2020-01-02 NOTE — PROVIDER NOTIFICATION
DENISEI pt had an initial BP of 161/92 after settling back into bed from bathroom. Repeat 15 min later 135/82. Also, would you like the 975 dose of tylenol to be q8h instead of q6h? Thanks!

## 2020-01-03 PROCEDURE — 12000001 ZZH R&B MED SURG/OB UMMC

## 2020-01-03 PROCEDURE — 25000132 ZZH RX MED GY IP 250 OP 250 PS 637: Performed by: OBSTETRICS & GYNECOLOGY

## 2020-01-03 PROCEDURE — 25000132 ZZH RX MED GY IP 250 OP 250 PS 637: Performed by: STUDENT IN AN ORGANIZED HEALTH CARE EDUCATION/TRAINING PROGRAM

## 2020-01-03 RX ADMIN — IBUPROFEN 600 MG: 600 TABLET ORAL at 14:38

## 2020-01-03 RX ADMIN — ACETAMINOPHEN 975 MG: 325 TABLET, FILM COATED ORAL at 03:54

## 2020-01-03 RX ADMIN — IBUPROFEN 600 MG: 600 TABLET ORAL at 01:59

## 2020-01-03 RX ADMIN — SENNOSIDES AND DOCUSATE SODIUM 2 TABLET: 8.6; 5 TABLET ORAL at 20:08

## 2020-01-03 RX ADMIN — PRENATAL VITAMINS-IRON FUMARATE 27 MG IRON-FOLIC ACID 0.8 MG TABLET 1 TABLET: at 08:44

## 2020-01-03 RX ADMIN — IBUPROFEN 600 MG: 600 TABLET ORAL at 20:08

## 2020-01-03 RX ADMIN — SENNOSIDES AND DOCUSATE SODIUM 2 TABLET: 8.6; 5 TABLET ORAL at 08:33

## 2020-01-03 RX ADMIN — ACETAMINOPHEN 975 MG: 325 TABLET, FILM COATED ORAL at 12:19

## 2020-01-03 RX ADMIN — IBUPROFEN 600 MG: 600 TABLET ORAL at 08:33

## 2020-01-03 RX ADMIN — ACETAMINOPHEN 975 MG: 325 TABLET, FILM COATED ORAL at 20:08

## 2020-01-03 NOTE — PLAN OF CARE
Has good pain control with motrin and tylenol. Was able to shower and ambulate to NICU without any concern of being light headed. Pumping for baby in NICU. Will continue with plan of care.

## 2020-01-03 NOTE — PLAN OF CARE
Data: Vital signs and postpartum checks WDL  Patient eating and drinking normally. Patient able to empty bladder independently and is up ambulating.  Patient performing self cares and is able to care for infant in NICU. Pumping with assist. .  Action: Patient medicated during the shift for pain with Tylenol and Ibuprofen with relief after 1 hour. Patient education done see education record.  Response: Support persons  present.    Plan: Continue with the plan of care

## 2020-01-03 NOTE — PROGRESS NOTES
Post Partum Progress Note  POD#2, s/p PLTCS  Subjective:  She is resting comfortably in bed this morning. Feels like she got behind on pumping and then pain control yesterday so had some increased soreness, but pain is overall well controlled on current medication regimen. Tolerating PO intake.  Ambulating independently without dizziness or difficulty. Voiding spontaneously. Passing flatus, no BM yet. She denies headache, changes in vision, nausea/vomiting, chest pain, shortness of breath, RUQ pain.  Plans to breastfeed and is pumping for infants in NICU.    Objective:  Vitals:    20 0200   BP: 130/75 134/78 132/78 129/86   Pulse: 95 90 95 91   Resp: 18 17 18 17   Temp: 99.1  F (37.3  C) 98.3  F (36.8  C) 98.5  F (36.9  C) 99  F (37.2  C)   TempSrc: Oral Oral Oral Oral   SpO2: 100% 100% 100%    Weight:       Height:         General: NAD. A&Ox3.  CV: Regular rate  Pulm: Normal respiratory effort.  Abd: Soft, non-tender, non-distended. Fundus is firm at  the umbilicus.    Incision: bandage overlying, no shadowing  Ext: Trace edema, SCDs in place, non tender    Hemoglobin   Date Value Ref Range Status   2020 7.9 (L) 11.7 - 15.7 g/dL Final     Assessment/Plan:  Estelle Escobar is a 29 year old  female who is POD#2 s/p PLTCS for PTL with di-di twins and breech/breech presentation.    #Gestational HTN  -BPs mild range over last 24 hours after nonsustained severe range the night prior  -HELLP labs collected  WNL, UPC 0.28.  -Will continue to monitor for signs and symptoms of preeclampsia.     - Encourage routine post-operative goals including ambulation   - PNC: Rh pos. Rubella immune. No intervention indicated.  - Pain: controlled on oral medications  - Heme: Hgb 10.6> > 7.9. Patient asymptomatic, VSS. Will discharge home with iron.  - GI: continue anti-emetics and stool softeners as needed.  - : voiding spontaneously  - Infants: Stable in NICU  -  Feeding: Plans on breastfeeding, pumping  - BC: Planning IUD    Discharge to home tomorrow.    Rosario Laird MD  Ob/Gyn PGY-3  01/03/20 6:50 AM

## 2020-01-03 NOTE — PLAN OF CARE
Vital signs stable. Postpartum assessment WDL. Incision clean, dry, with dressing in place. Pain controlled with Ibuprofen and Tylenol. Patient ambulating and voiding independently. Patient passing gas. Pumping and visiting  twins in NICU frequently. Will continue with current plan of care.

## 2020-01-04 VITALS
HEIGHT: 66 IN | TEMPERATURE: 97.9 F | OXYGEN SATURATION: 100 % | SYSTOLIC BLOOD PRESSURE: 137 MMHG | WEIGHT: 200 LBS | HEART RATE: 99 BPM | BODY MASS INDEX: 32.14 KG/M2 | DIASTOLIC BLOOD PRESSURE: 84 MMHG | RESPIRATION RATE: 16 BRPM

## 2020-01-04 PROCEDURE — 25000132 ZZH RX MED GY IP 250 OP 250 PS 637: Performed by: STUDENT IN AN ORGANIZED HEALTH CARE EDUCATION/TRAINING PROGRAM

## 2020-01-04 RX ORDER — OXYCODONE HYDROCHLORIDE 5 MG/1
5 TABLET ORAL EVERY 6 HOURS PRN
Qty: 3 TABLET | Refills: 0 | Status: SHIPPED | OUTPATIENT
Start: 2020-01-04 | End: 2020-02-11

## 2020-01-04 RX ADMIN — SENNOSIDES AND DOCUSATE SODIUM 2 TABLET: 8.6; 5 TABLET ORAL at 08:03

## 2020-01-04 RX ADMIN — ACETAMINOPHEN 975 MG: 325 TABLET, FILM COATED ORAL at 04:01

## 2020-01-04 RX ADMIN — PRENATAL VITAMINS-IRON FUMARATE 27 MG IRON-FOLIC ACID 0.8 MG TABLET 1 TABLET: at 08:03

## 2020-01-04 RX ADMIN — IBUPROFEN 600 MG: 600 TABLET ORAL at 02:07

## 2020-01-04 RX ADMIN — IBUPROFEN 600 MG: 600 TABLET ORAL at 08:03

## 2020-01-04 NOTE — PLAN OF CARE
Vitals & PP assessments are stable. Lungs clear. Up ad abel & denies dizziness. Had BM & passing flatus. Incision healing & well approximated. No signs of infection noted.  Pain well controled with scheduled PO pain meds. Pt is pumping Q2-3 hours & is getting good amount of colostrum.     Continue on supportive cares.

## 2020-01-04 NOTE — PLAN OF CARE
Pt vitals are stable. Pain well control with Tylenol and Ibuprofen. Tolerating regular diet. Pumping. Visiting with infants in NICU. No complaints. Will continue with plan of care

## 2020-01-04 NOTE — PLAN OF CARE
Patient is discharged to home today in stable condition. Pain has been well ocntrolled with current pain regime. Discharge education and instructions reviewed and questions were addressed.

## 2020-01-04 NOTE — PROGRESS NOTES
Post Partum Progress Note  POD#3, s/p PLTCS  Subjective:  She is resting comfortably in bed this morning. States she has been Intermittently sad/tearful given abrupt nature of her delivery but is overall ok. Pain is well controlled on current medication regimen. Tolerating PO intake.  Ambulating independently without dizziness or difficulty. Voiding spontaneously. Passing flatus, has had BM. She denies headache, changes in vision, nausea/vomiting, chest pain, shortness of breath, RUQ pain.  Plans to breastfeed and is pumping for infants in NICU.    Objective:  Vitals:    20 0200 20 0848 20 0207   BP: 129/86 132/84 133/89 126/85   BP Location:   Right arm    Pulse: 91 98 98 100   Resp: 17 16 18 20   Temp: 99  F (37.2  C) 98.7  F (37.1  C) 98.4  F (36.9  C) 98.4  F (36.9  C)   TempSrc: Oral Oral Oral Oral   SpO2:       Weight:       Height:         General: NAD. A&Ox3.  CV: Regular rate  Pulm: Normal respiratory effort.  Abd: Soft, non-tender, non-distended. Fundus is firm at  the umbilicus.    Incision: steri strips in place, c/d/i, bruising below incision where bladder blade would have been placed, no erythema, tenderness, warmth  Ext: 1+ edema, SCDs in place, non-tender    Hemoglobin   Date Value Ref Range Status   2020 7.9 (L) 11.7 - 15.7 g/dL Final     Assessment/Plan:  Estelle Escobar is a 29 year old  female who is POD#3 s/p PLTCS for PTL with di-di twins and breech/breech presentation.    #Gestational HTN  - BPs normotensive over last 24 hours  - HELLP labs collected  WNL, UPC 0.28  - Will continue to monitor for signs and symptoms of preeclampsia  - Outpatient BP check in 1 week    - Encourage routine post-operative goals including ambulation   - PNC: Rh pos. Rubella immune. No intervention indicated.  - Pain: controlled on oral medications  - Heme: Hgb 10.6> > 7.9. Patient asymptomatic, VSS. Will discharge home with iron.  - GI: continue anti-emetics and  stool softeners as needed.  - : voiding spontaneously  - Infants: Stable in NICU  - Feeding: Plans on breastfeeding, pumping  - BC: Planning IUD    Discharge to home today.    Discussed with Dr. Manuel.    Quan Gold MD MPH  OB/Gyn PGY-1  01/04/20 7:27 AM    [facility] staff:  Appreciate note by Dr. Godl.  I have seen and examined the patient without the resident. I have reviewed, edited, and agree with the note.      eNss Manuel MD, FACOG  1/4/2020  11:33 AM

## 2020-01-07 ENCOUNTER — OFFICE VISIT (OUTPATIENT)
Dept: OBGYN | Facility: CLINIC | Age: 30
End: 2020-01-07
Attending: OBSTETRICS & GYNECOLOGY
Payer: COMMERCIAL

## 2020-01-07 VITALS
WEIGHT: 182.7 LBS | DIASTOLIC BLOOD PRESSURE: 77 MMHG | BODY MASS INDEX: 29.49 KG/M2 | HEART RATE: 103 BPM | SYSTOLIC BLOOD PRESSURE: 119 MMHG

## 2020-01-07 PROCEDURE — G0463 HOSPITAL OUTPT CLINIC VISIT: HCPCS | Mod: ZF

## 2020-01-07 ASSESSMENT — PAIN SCALES - GENERAL: PAINLEVEL: NO PAIN (0)

## 2020-01-07 NOTE — LETTER
2020       RE: Estelle Escobar  [address] [address]     Dear Colleague,    Thank you for referring your patient, Estelle Escobar, to the [facility] at [facility]. Please see a copy of my visit note below.    [facility] Visit    CC: postpartum incision, bp check    S: 29 year old  1 week s/p PLTCS for di/di twins  labor at 29 wks. Doing well since discharge. Is back home and getting into routine with babies in NICU. Pain is well controlled with ibuprofen/tylenol. Bleeding is light. Denies headache. Pumping for babies.     O: /77 (BP Location: Left arm, Patient Position: Chair)   Pulse 103   Wt 82.9 kg (182 lb 11.2 oz)   LMP 06/10/2019   Breastfeeding Yes   BMI 29.49 kg/m     General: No distress  Abdomen: Soft, non-tender, non-distended, no masses  Incision: Well healing    A:29 year old s/p PTLCS for di/di twins,  labor    P: BP normal today  Incision well healing  Mood stable  RTC 5 wks pp visit    Alina Gama MD FACOG

## 2020-01-13 PROBLEM — O09.92 HIGH-RISK PREGNANCY IN SECOND TRIMESTER: Status: RESOLVED | Noted: 2019-09-19 | Resolved: 2020-01-13

## 2020-01-13 PROBLEM — O60.00 PRETERM LABOR: Status: RESOLVED | Noted: 2020-01-01 | Resolved: 2020-01-13

## 2020-01-13 PROBLEM — Z36.89 ENCOUNTER FOR TRIAGE IN PREGNANT PATIENT: Status: RESOLVED | Noted: 2020-01-01 | Resolved: 2020-01-13

## 2020-01-13 PROBLEM — O30.042 DICHORIONIC DIAMNIOTIC TWIN PREGNANCY IN SECOND TRIMESTER: Status: RESOLVED | Noted: 2019-08-04 | Resolved: 2020-01-13

## 2020-01-13 PROBLEM — O30.049 DICHORIONIC DIAMNIOTIC TWIN PREGNANCY, ANTEPARTUM: Status: RESOLVED | Noted: 2019-11-15 | Resolved: 2020-01-13

## 2020-01-13 PROBLEM — O30.049 DICHORIONIC DIAMNIOTIC TWIN PREGNANCY: Status: RESOLVED | Noted: 2020-01-01 | Resolved: 2020-01-13

## 2020-01-13 NOTE — PROGRESS NOTES
[facility] Visit    CC: postpartum incision, bp check    S: 29 year old  1 week s/p PLTCS for di/di twins  labor at 29 wks. Doing well since discharge. Is back home and getting into routine with babies in NICU. Pain is well controlled with ibuprofen/tylenol. Bleeding is light. Denies headache. Pumping for babies.     O: /77 (BP Location: Left arm, Patient Position: Chair)   Pulse 103   Wt 82.9 kg (182 lb 11.2 oz)   LMP 06/10/2019   Breastfeeding Yes   BMI 29.49 kg/m    General: No distress  Abdomen: Soft, non-tender, non-distended, no masses  Incision: Well healing    A:29 year old s/p PTLCS for di/di twins,  labor    P: BP normal today  Incision well healing  Mood stable  RTC 5 wks pp visit      Alina Gama MD FACOG

## 2020-01-22 LAB — COPATH REPORT: NORMAL

## 2020-02-11 ENCOUNTER — OFFICE VISIT (OUTPATIENT)
Dept: OBGYN | Facility: CLINIC | Age: 30
End: 2020-02-11
Attending: OBSTETRICS & GYNECOLOGY
Payer: COMMERCIAL

## 2020-02-11 VITALS
HEART RATE: 90 BPM | HEIGHT: 66 IN | BODY MASS INDEX: 28.69 KG/M2 | SYSTOLIC BLOOD PRESSURE: 121 MMHG | DIASTOLIC BLOOD PRESSURE: 79 MMHG | WEIGHT: 178.5 LBS

## 2020-02-11 DIAGNOSIS — Z30.430 ENCOUNTER FOR IUD INSERTION: ICD-10-CM

## 2020-02-11 PROCEDURE — G0463 HOSPITAL OUTPT CLINIC VISIT: HCPCS | Mod: 25

## 2020-02-11 PROCEDURE — 58300 INSERT INTRAUTERINE DEVICE: CPT | Mod: ZF | Performed by: OBSTETRICS & GYNECOLOGY

## 2020-02-11 PROCEDURE — 25000128 H RX IP 250 OP 636: Mod: ZF | Performed by: OBSTETRICS & GYNECOLOGY

## 2020-02-11 RX ADMIN — LEVONORGESTREL 20 MCG: 52 INTRAUTERINE DEVICE INTRAUTERINE at 13:28

## 2020-02-11 ASSESSMENT — PATIENT HEALTH QUESTIONNAIRE - PHQ9
5. POOR APPETITE OR OVEREATING: SEVERAL DAYS
SUM OF ALL RESPONSES TO PHQ QUESTIONS 1-9: 3

## 2020-02-11 ASSESSMENT — ANXIETY QUESTIONNAIRES
GAD7 TOTAL SCORE: 2
1. FEELING NERVOUS, ANXIOUS, OR ON EDGE: NOT AT ALL
5. BEING SO RESTLESS THAT IT IS HARD TO SIT STILL: NOT AT ALL
6. BECOMING EASILY ANNOYED OR IRRITABLE: NOT AT ALL
3. WORRYING TOO MUCH ABOUT DIFFERENT THINGS: SEVERAL DAYS
2. NOT BEING ABLE TO STOP OR CONTROL WORRYING: NOT AT ALL
7. FEELING AFRAID AS IF SOMETHING AWFUL MIGHT HAPPEN: NOT AT ALL

## 2020-02-11 ASSESSMENT — MIFFLIN-ST. JEOR: SCORE: 1551.42

## 2020-02-11 NOTE — LETTER
2020       RE: Estelle Escobar  [address] [address]     Dear Colleague,    Thank you for referring your patient, Estelle Escobar, to the [facility] at [facility]. Please see a copy of my visit note below.    Nursing Notes:   Xiomy RojoKENNETH  2020  9:41 AM  Signed  SUBJECTIVE:   Estelle Escobar is here for her 6-week postpartum checkup.     PHQ-9 score: 3  Hx of Abuse:  No    Delivery Date: 20.    Delivering provider:  Lourdes Lainez MD.    Type of delivery:  .     Delivery complications:   Infant gender:  twin boys, weight 2 pounds 15 oz., 3pounds 5 ounces  Feeding Method:   and Bottlefed.  Complications reported with feeding:  none, infant thriving .    Bleeding:  Light.  Duration:  6 weeks.  Menses resumed:  No  Bowel/Urinary problems:  No    Contraception Planned:  Consult  She  has not had intercourse since delivery..       Estelle is coming in for a 6 week post partum visit without any specific concerns but would like an IUD placed today. Twin boys are still in the hospital, now moved to the 11th floor, both doing well with anticipated discharge around the end of the month, pending feeding/respiratory status. Denies any symptoms of depression but does occasionally get sad when spending long periods of time alone as during 72 hour infant lead feeding this past weekend in the hospital. Doing well with pumping, producing 40-42 ounces of milk/day. Goal is to primarily breastfeed once babies are home, breastfeeding going well with babies in hospital. Vaginal bleeding improved, barely any in last couple of days. Switched back to using tampons ~1 week ago after irritation from dampness of pads. No constipation or diarrhea, normal bowel movements. Desires IUD to be placed today for birth control, no plans for future children at this time. Denies fevers, headache, changes in vision, dyspnea, chest pain,  "dysuria, weakness/numbness in extremities.   ================================================================  ROS: 10 point ROS neg other than the symptoms noted above in the HPI.     EXAM:  /79   Pulse 90   Ht 1.676 m (5' 6\")   Wt 81 kg (178 lb 8 oz)   LMP 06/10/2019   BMI 28.81 kg/m       General: healthy, alert and no distress  Psych: negative for depression, anxiety  Last PHQ-9 score on record= 3  Breasts:  Nipples intact with no lesions  Abdomen: Benign, soft, flat, non-tender  Incision:  well healing, dry and intact  Vulva:  Normal genitalia and Bartholin's, Urethra, Corona's normal  Vagina:  normal with good muscle tone  Cervix:  no lesions  Uterus:  fully involuted and non-tender      IUD Insertion Note:      Time Out - \"Pause for the Cause\"  Just before the procedure begins, through verbal and active participation of team members, verify:    Initials   Patient Name    [facility]   Patient Date of Birth [facility]   Procedure to be performed  [facility]     Consent:  Risks, benefits of treatment, and no treatment were discussed.  Patient's questions were elicited and answered. , Written consent signed and scanned into medical record. and Patient received and verbalized understanding of discharge instructions    After informed consent was obtained from the patient, a speculum was placed in the vagina to visualized the cervix.  The cervix was then swabbed with a betadine prep x 3.   Tenaculum was placed at the 12 o'clock position on the cervix and the uterus sounded to 8.5 cm.  The Mirena  IUD was then placed in the usual fashion under sterile technique.  Strings were clipped about 2 cm from the cervical os.  Tenaculum was removed and cervix was hemostatic.  There were no complications.  The patient tolerated the procedure well.    ASSESSMENT:   Estelle is a 29 year old female  s/p PTLCS for di/di twins delivered at 29 weeks and 2 days due to  labor here for 6 week postpartum visit with no concerns. Twins " still in hospital but mom doing well with stable mood. Discussed differences between Mirena and Paraguard IUD, risks and benefits of Mirena and placement.    Normal postpartum exam after c/s for twin gestation,  labor  Pregnancy was complicated by:   Labor and Twins     PLAN:  - Placed IUD today  - Return to clinic in 4-6 weeks to check IUD      Patient seen and examined with attending, Dr. Gama.   Annetta Morales, MS3    Physician Attestation   I, Alina Gama, was present with the medical student who participated in the service and in the documentation of the note.  I have verified the history and personally performed the physical exam and medical decision making.  I agree with the assessment and plan of care as documented in the note.    I was present for IUD insertion    Alina Gama MD

## 2020-02-11 NOTE — PROGRESS NOTES
"Nursing Notes:   Xiomy Rojo, [facility]  2020  9:41 AM  Signed  SUBJECTIVE:   Estelle Escobar is here for her 6-week postpartum checkup.     PHQ-9 score: 3  Hx of Abuse:  No    Delivery Date: 20.    Delivering provider:  Lourdes Lainez MD.    Type of delivery:  .     Delivery complications:   Infant gender:  twin boys, weight 2 pounds 15 oz., 3pounds 5 ounces  Feeding Method:   and Bottlefed.  Complications reported with feeding:  none, infant thriving .    Bleeding:  Light.  Duration:  6 weeks.  Menses resumed:  No  Bowel/Urinary problems:  No    Contraception Planned:  Consult  She  has not had intercourse since delivery..       Estelle is coming in for a 6 week post partum visit without any specific concerns but would like an IUD placed today. Twin boys are still in the hospital, now moved to the 11th floor, both doing well with anticipated discharge around the end of the month, pending feeding/respiratory status. Denies any symptoms of depression but does occasionally get sad when spending long periods of time alone as during 72 hour infant lead feeding this past weekend in the hospital. Doing well with pumping, producing 40-42 ounces of milk/day. Goal is to primarily breastfeed once babies are home, breastfeeding going well with babies in hospital. Vaginal bleeding improved, barely any in last couple of days. Switched back to using tampons ~1 week ago after irritation from dampness of pads. No constipation or diarrhea, normal bowel movements. Desires IUD to be placed today for birth control, no plans for future children at this time. Denies fevers, headache, changes in vision, dyspnea, chest pain, dysuria, weakness/numbness in extremities.   ================================================================  ROS: 10 point ROS neg other than the symptoms noted above in the HPI.     EXAM:  /79   Pulse 90   Ht 1.676 m (5' 6\")   Wt 81 kg (178 lb 8 oz)   LMP 06/10/2019   BMI 28.81 " "kg/m      General: healthy, alert and no distress  Psych: negative for depression, anxiety  Last PHQ-9 score on record= 3  Breasts:  Nipples intact with no lesions  Abdomen: Benign, soft, flat, non-tender  Incision:  well healing, dry and intact  Vulva:  Normal genitalia and Bartholin's, Urethra, Hamler's normal  Vagina:  normal with good muscle tone  Cervix:  no lesions  Uterus:  fully involuted and non-tender      IUD Insertion Note:      Time Out - \"Pause for the Cause\"  Just before the procedure begins, through verbal and active participation of team members, verify:    Initials   Patient Name    [facility]   Patient Date of Birth [facility]   Procedure to be performed  [facility]     Consent:  Risks, benefits of treatment, and no treatment were discussed.  Patient's questions were elicited and answered. , Written consent signed and scanned into medical record. and Patient received and verbalized understanding of discharge instructions    After informed consent was obtained from the patient, a speculum was placed in the vagina to visualized the cervix.  The cervix was then swabbed with a betadine prep x 3.   Tenaculum was placed at the 12 o'clock position on the cervix and the uterus sounded to 8.5 cm.  The Mirena  IUD was then placed in the usual fashion under sterile technique.  Strings were clipped about 2 cm from the cervical os.  Tenaculum was removed and cervix was hemostatic.  There were no complications.  The patient tolerated the procedure well.    ASSESSMENT:   Estelle is a 29 year old female  s/p PTLCS for di/di twins delivered at 29 weeks and 2 days due to  labor here for 6 week postpartum visit with no concerns. Twins still in hospital but mom doing well with stable mood. Discussed differences between Mirena and Paraguard IUD, risks and benefits of Mirena and placement.    Normal postpartum exam after c/s for twin gestation,  labor  Pregnancy was complicated by:   Labor and Twins     PLAN:  - " Placed IUD today  - Return to clinic in 4-6 weeks to check IUD      Patient seen and examined with attending, Dr. Gama.   Annetta Morales, MS3    Physician Attestation   I, Alina Gama, was present with the medical student who participated in the service and in the documentation of the note.  I have verified the history and personally performed the physical exam and medical decision making.  I agree with the assessment and plan of care as documented in the note.    I was present for IUD insertion    Alina Gama MD

## 2020-02-11 NOTE — NURSING NOTE
SUBJECTIVE:   Estelle Escobar is here for her 6-week postpartum checkup.     PHQ-9 score: 3  Hx of Abuse:  No    Delivery Date: 20.    Delivering provider:  Lourdes Lainez MD.    Type of delivery:  .     Delivery complications:   Infant gender:  twin boys, weight 2 pounds 15 oz., 3pounds 5 ounces  Feeding Method:   and Bottlefed.  Complications reported with feeding:  none, infant thriving .    Bleeding:  Light.  Duration:  6 weeks.  Menses resumed:  No  Bowel/Urinary problems:  No    Contraception Planned:  Consult  She  has not had intercourse since delivery..

## 2020-02-12 ASSESSMENT — ANXIETY QUESTIONNAIRES: GAD7 TOTAL SCORE: 2

## 2020-03-01 ENCOUNTER — HEALTH MAINTENANCE LETTER (OUTPATIENT)
Age: 30
End: 2020-03-01

## 2020-12-13 ENCOUNTER — HEALTH MAINTENANCE LETTER (OUTPATIENT)
Age: 30
End: 2020-12-13

## 2021-04-17 ENCOUNTER — HEALTH MAINTENANCE LETTER (OUTPATIENT)
Age: 31
End: 2021-04-17

## 2021-05-17 ENCOUNTER — TELEPHONE (OUTPATIENT)
Dept: OBGYN | Facility: CLINIC | Age: 31
End: 2021-05-17

## 2021-05-17 NOTE — TELEPHONE ENCOUNTER
Spoke with patient who is tearful on call. Has been struggling recently with feeling of depression and is looking for help. Discussed with Dr. Gama who will add patient on for phone visit tomorrow. This was done.     RN placing [facility] ahead of this phone visit, per patient request.

## 2021-05-17 NOTE — TELEPHONE ENCOUNTER
----- Message from Carlotta Price RN sent at 5/17/2021  1:37 PM CDT -----  Regarding: Experiencing PPD, would like referral for help  Tearful on voicemail

## 2021-05-18 ENCOUNTER — VIRTUAL VISIT (OUTPATIENT)
Dept: OBGYN | Facility: CLINIC | Age: 31
End: 2021-05-18
Attending: OBSTETRICS & GYNECOLOGY
Payer: COMMERCIAL

## 2021-05-18 DIAGNOSIS — F32.A DEPRESSION, UNSPECIFIED DEPRESSION TYPE: Primary | ICD-10-CM

## 2021-05-18 PROCEDURE — 99213 OFFICE O/P EST LOW 20 MIN: CPT | Mod: 95 | Performed by: OBSTETRICS & GYNECOLOGY

## 2021-05-18 NOTE — LETTER
Date:May 20, 2021      Patient was self referred, no letter generated. Do not send.        [facility] Health Information

## 2021-05-18 NOTE — LETTER
"2021       RE: Estelle Escobar  [address] [address]     Dear Colleague,    Thank you for referring your patient, Estelle Escobar, to the [facility] [place] [facility]. Please see a copy of my visit note below.    [facility] TELEPHONE VISIT    SUBJECTIVE     Estelle Escobar is a 30 year old female who is being evaluated via a billable telephone visit.    Patient opted to conduct today's return visit via telephone secondary to the COVID-19 pandemic vs. an in person visit to the clinic.    I spoke with: Estelle Escobar    The patient has been notified of following:   \"This telephone visit will be conducted via a call between you and your physician/provider. We have found that certain health care needs can be provided without the need for a physical exam.  This service lets us provide the care you need with a short phone conversation.  If a prescription is necessary we can send it directly to your pharmacy.  If lab work is needed we can place an order for that and you can then stop by our lab to have the test done at a later time.  If during the course of the call the physician/provider feels a telephone visit is not appropriate, you will not be charged for this service.\"     The reason for the telephone visit: Concern for postpartum depression    Estelle reports that she has been increasingly anxious and having feelings of loss of interest recently. She is busy at home with her twins, but feels she is unable to get many tasks done. Though she enjoys some things, she has an overall sense of loss of enjoyment in general. She is tearful on the phone. Discussed with her , he expressed that he had expected her to be working more at this point following birth of their children last winter (2020). She feels he is supportive in general. She denies any suicidal ideation.       ASSESSMENT   Estelle Escobar is a 30 year old , " telephone visit for evaluation of depression/anxiety.     PLAN   Recommend therapy- referral to [facility] and given info for pyschologists in clinic as well.   Rx zoloft to start- reviewed dosing.   Follow up in 4-6 weeks  Phone call start:2:35  Phone call end:2:50  Phone call duration:  15 minutes      Alina Gama MD                Again, thank you for allowing me to participate in the care of your patient.      Sincerely,    Alina Gama MD

## 2021-05-19 ENCOUNTER — TELEPHONE (OUTPATIENT)
Dept: PSYCHIATRY | Facility: CLINIC | Age: 31
End: 2021-05-19

## 2021-05-19 NOTE — TELEPHONE ENCOUNTER
"PSYCHIATRY CLINIC PHONE INTAKE     SERVICES REQUESTED / INTERESTED IN          Med Management - [facility]    Presenting Problem and Brief History                              What would you like to be seen for? (brief description):  Pt is about 16 months post-partum. Pt had twins 10 weeks early in Jan 2020. Being in the NICU during the pandemic was hard. She feels that she has been internalizing a lot of stress lately. She feels like she cannot enjoy anything because she is always overwhelmed. She feels she lacks energy to engage in activities. She will become guilty for engaging in activities and then become overwhelmed. She feels like she has been dealing with it since the birth of her children. Pt mom also dealt with a similar mental health status. Pt stated she is sleeping well - better than when she was nursing twins. Her appetite is all over the place. Pt feels \"stationary\". Pt OBGYN gave referral - she gave the pt a ZOLOFT script.   Have you received a mental health diagnosis? Yes   Which one (s): Idiopathic Hyper somnia (2013);   Is there any history of developmental delay?  No   Are you currently seeing a mental health provider?  No            Who / month last seen:  NA   Do you have mental health records elsewhere?  Yes  Will you sign a release so we can obtain them?  Yes    Have you ever been hospitalized for psychiatric reasons?  No  Describe:  NA     Do you have current thoughts of self-harm?  No    Do you currently have thoughts of harming others?  No       Substance Use History     Do you have any history of alcohol / illicit drug use?  No  Describe:  NA   Have you ever received treatment for this?  No    Describe:  NA      Social History     Who is the patient's a guardian?  Yes    Name / number: Self   Have you had an ACT team in last 12 months?  No  Describe: NA    Do you have any current or past legal issues?  No  Describe: NA    OK to leave a detailed voicemail?  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 100 tablet 0     ferrous sulfate (FEROSUL) 325 (65 Fe) MG tablet Take 1 tablet (325 mg) by mouth daily (with breakfast) 60 tablet 0     Prenatal MV-Min-Fe Fum-FA-DHA (PRENATAL 1 PO)        sertraline (ZOLOFT) 50 MG tablet Take 1 tablet (50 mg) by mouth daily 90 tablet 3         DISPOSITION      Phone screen completed by Dawna Enrique.  Sent to Chana Piedra for review.

## 2021-05-19 NOTE — PROGRESS NOTES
"[facility] TELEPHONE VISIT    SUBJECTIVE     Estelle Escobar is a 30 year old female who is being evaluated via a billable telephone visit.    Patient opted to conduct today's return visit via telephone secondary to the COVID-19 pandemic vs. an in person visit to the clinic.    I spoke with: Estelle Escobar    The patient has been notified of following:   \"This telephone visit will be conducted via a call between you and your physician/provider. We have found that certain health care needs can be provided without the need for a physical exam.  This service lets us provide the care you need with a short phone conversation.  If a prescription is necessary we can send it directly to your pharmacy.  If lab work is needed we can place an order for that and you can then stop by our lab to have the test done at a later time.  If during the course of the call the physician/provider feels a telephone visit is not appropriate, you will not be charged for this service.\"     The reason for the telephone visit: Concern for postpartum depression    Estelle reports that she has been increasingly anxious and having feelings of loss of interest recently. She is busy at home with her twins, but feels she is unable to get many tasks done. Though she enjoys some things, she has an overall sense of loss of enjoyment in general. She is tearful on the phone. Discussed with her , he expressed that he had expected her to be working more at this point following birth of their children last winter (2020). She feels he is supportive in general. She denies any suicidal ideation.       ASSESSMENT   Estelle Escobar is a 30 year old , telephone visit for evaluation of depression/anxiety.     PLAN   Recommend therapy- referral to [facility] and given info for pyschologists in clinic as well.   Rx zoloft to start- reviewed dosing.   Follow up in 4-6 weeks  Phone call start:2:35  Phone call end:2:50  Phone call duration:  15 " minutes      Alina Gama MD

## 2021-06-04 NOTE — PLAN OF CARE
Blood pressure looks great!  I sent a new prescription of the 10 mg tablets to the pharmacy to take just 1 of those a day instead of breaking the 20s in half.    I did place the referral for sleep medicine.  You should be getting a call within the next 2 weeks or so to get set up to talk with them.    I usually comment on labs and imaging after they are all resulted within 2 business days. If you haven't heard your results within a week, please contact the office.    Thank you for coming in today!    If you receive a survey from [email or web address] about your experience today, it would be very helpful if you could fill it out to let us know what went well and what we can improve!    General Information:    Today you had your appointment with Curry Hameed NP    My hours are:    Monday : Out of clinic  Tuesday : 8:00AM - 5:00 PM  Wednesday: 8:00AM - 5:00 PM  Thursday: 8:00AM - 5:00 PM  Friday: 8:00AM - 5:00 PM    I am not in the office Mondays. Therefore non-urgent calls and medical messages received on Monday will be addressed when I am back in the office on Tuesday. Urgent matters will be reviewed and addressed by one of my partners in the office as needed.    If lab work was done today as part of your evaluation you will generally be contacted via [employer], mail, or phone with the results within 1-5 days. If there is an alarming result we will contact you by phone. Lab results come back at varying times, I generally wait until all lab results are available before making comments on the results.     If you need refills please contact your pharmacy. They will send a refill request to me to review. Please allow 3-5 business days for us to process all refill requests.     My Clinical Assistant is Apolonia. Please call us at [phone] or send a medical message with any questions or concerns.      Pt had a significant BP of 161/92 after ambulation. The recheck to follow at 0145 was 135/82. Pt denies headache or blurry vision, though mild edema present. All other vitals stable. Urine output clear and adequate. Patient ambulating independently with only stand- by assist. Catheter removed this morning. Pain managed with tylenol and toradol. Passing gas, no BM. Fundus firm bleeding scant. Pumping with minimal assist, collecting 5mL approx. Significant other Jordan present at bedside. Continue plan of care.

## 2021-06-17 ENCOUNTER — VIRTUAL VISIT (OUTPATIENT)
Dept: PSYCHIATRY | Facility: CLINIC | Age: 31
End: 2021-06-17
Attending: PSYCHOLOGIST
Payer: COMMERCIAL

## 2021-06-17 ENCOUNTER — MYC MEDICAL ADVICE (OUTPATIENT)
Dept: PSYCHIATRY | Facility: CLINIC | Age: 31
End: 2021-06-17
Payer: COMMERCIAL

## 2021-06-17 DIAGNOSIS — F33.1 MAJOR DEPRESSIVE DISORDER, RECURRENT EPISODE, MODERATE (H): ICD-10-CM

## 2021-06-17 DIAGNOSIS — F41.1 GENERALIZED ANXIETY DISORDER: Primary | ICD-10-CM

## 2021-06-17 PROCEDURE — 90791 PSYCH DIAGNOSTIC EVALUATION: CPT | Mod: GT

## 2021-07-08 ENCOUNTER — TELEPHONE (OUTPATIENT)
Dept: PSYCHIATRY | Facility: CLINIC | Age: 31
End: 2021-07-08

## 2021-07-08 NOTE — TELEPHONE ENCOUNTER
Spoke with patient to offer therapy services through the [facility]. Patient accepted and scheduled first appointment for 7/15.

## 2021-07-13 NOTE — PROGRESS NOTES
[facility] [place]                                                                 Department of Psychiatry  [phone] (Office)                                                             [address] [place]  [phone] (Clinic)                                                             [address] [place] [address]  [phone] (Fax)                                                                [place]  [address]     [facility]  DIAGNOSTIC EVALUATION AND PSYCHOLOGICAL ASSESSMENT        PATIENT: Estelle Escobar     : 1990     Encounter Date: 21                                        MRN: [id number]     Evaluator: Stefanie Palencia MA, Practicum Student  Supervisor: Марина Laguerre Psy.D., [place]    Psychiatric Diagnostic Evaluation: 2 hours spent with the patient ([id number])      Type of service: Telemedicine Evaluation  Reason for Telemedicine Visit: COVID-19 public health recommendations on in-person sessions  Mode of transmission: Secure real time interactive audio and visual telecommunication system (video conference via [employer])  Location of originating and distant sites:  ? Originating site (patient location): patient home  ? Distant site (provider site): HIPAA compliant location within provider home/remote setting  Telemedicine Visit: The patient's condition can be safely assessed and treated via synchronous audio and visual telemedicine encounter.    Patient has agreed to receiving services via telemedicine technology.     IDENTIFYING DATA:   Estelle Ely is a 30-year-old White-American female who presented for the current evaluation as part of the assessment process for the [facility]. She was seeking care with the [facility] due to the experience of increased anxiety and symptoms of depression since becoming pregnant and giving birth to twin boys in 2020. The following information was obtained through a clinical interview  and chart review. Limits of confidentiality and the purpose of the appointment (diagnostic evaluation) were described at the beginning of the session.      CHIEF COMPLAINT:    Estelle is seeking care with the [facility] team due to the experience of increased symptoms of anxiety and depression since giving birth to her two twin boys in January 2020. She reported that her goals were to receive recommendations about services to address her concerns. Estelle was placed on a 50 mg prescription of Zoloft by her OB/GYN about a month ago. Estelle reported some alleviation of symptoms but indicated that she is still experiencing significant anxiety and down mood. 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      MENTAL HEALTH HISTORY AND DIAGNOSTIC INTERVIEW: Estelle completed a clinical interview and the Mini International Neuropsychiatric Interview (M.I.N.I) to assist with our diagnostic assessment of current psychological functioning.     Estelle noted a considerable increase in symptoms of worry that are hard to control. 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 She reported that this worry has felt like she has a hard time controlling her thoughts, and it has been ongoing for over 6 months. She often feels restless, keyed up, and easily fatigued. She reported that she has tight muscles and jaw pain. Her anxious thoughts are making it hard for her to organize tasks, and making it difficult to fall asleep. Estelle also noted intrusive thoughts and nightmares about bad things happening like her baby or dogs dying.     Estelle reported an increase in down mood over the last 6 months to a year. She reported a lack of interest in doing activities she was once interested in and a lack of motivation to complete tasks. She also indicated increase in guilt - especially  around feeling like she is not fulfilling her roles in her family and job - as well as worthlessness/hopelessness. She indicated swings in energy, where sometimes she feels so exhausted that she cannot do anything. She also reported extreme indecisiveness and a reliance on her  to help her make decisions and help her plan out the tasks she will complete in the day. Estelle reported that before getting an IUD, she experienced mood changes in the week before her period. During this time she felt down and like she lost a lot of self-confidence.     Estelle denied current suicidal ideation and intent. She denied past experiences of trauma and panic attacks. Estelle indicated no symptoms of renay or delusions during our interview. Estelle indicated no repetitive or compulsive behaviors in order to help resist her recurrent anxious thoughts. Estelle denied purging or restrictive eating behaviors. She is 5 6  and at her heaviest was 180 lbs., and at her lowest was 140 lbs. Estelle did indicate some concerns about her weight and her current body image. Estelle denied current substance use concerns, but did report some drinking (1 to 2 drinks nightly).     PREVIOUS PSYCHIATRIC HISTORY:  Estelle reported no previous mental health diagnoses or therapy.      PREGNANCY/BIRTH HISTORY:  Estelle s pregnancy with her two twin boys was her only known pregnancy. This was a planned pregnancy, but she reported that it took her a while to get pregnant. Estelle reported that she was very excited about being pregnant, and cried from being over joyed at finding out she was having twins. She reported that she always wanted twins, but didn t think it was possible since twins did not run in her family. She described the pregnancy as generally easy, but that she did experience a brief  slump  during her pregnancy, that in retrospect she attributes to symptoms of Depression. Estelle also indicated that she was reading a lot of literature about having  multiples, and that some of the books were giving her nightmares. She did report that she has a friend that has also had multiples and has been a continuous source of support.     Estelle reported that her water broke when she was in bed in January (approximately 10 weeks before her due date). At first she found it really funny, because she thought she was urinating on herself. Once she realized what was happening they rushed her to the hospital. At the hospital they discovered that both babies were in breech positions and that they had to do a . Estelle reported that this part of the experience was a  blur . One of her babies spent 10 weeks in the NICU and the other spent 12 weeks. Estelle went to the NICU every day. She reported a very positive experience with the hospital staff, and that some of them have come to visit the boys since being discharged from the NICU. Estelle indicated that she continues to have a sense of guilt because her  body failed at protecting  her children. Estelle breast fed her babies for 15 months, and is not currently breastfeeding. She is not currently planning on having more children.     CURRENT MEDICATIONS:   Estelle is currently taking 50 mg of Zoloft daily. Her OB/GYN is managing her prescription. She also takes a multi-vitamin and fish oil supplement.     PAST MEDICATIONS:   Estelle has taken Methylphenidate and an unknown medication to help her fall asleep at night. She reported she stopped these before becoming pregnant. Estelle denied taking any additional psychiatric medications for mental health concerns.     SOCIAL HISTORY:   Estelle lives with her  and two children in [place], [place]. 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 Estelle reported that her  is the financial bread winner for the household. She reported that financially they are able to meet  their basic needs but are not currently saving. This has been a source of stress or Estelle and her family. Estelle reported a positive and supportive relationship with her . She also reported a positive and supportive relationship with her  s parents. Her other immediate family is mostly in [place], but she reported that she often talks with her mother. She reported that she can talk with her family about her mental health, and she has a network of friends.     When Estelle was a child her father was an oil rig worker. In order for him to find work, they moved approximately two times a year until she was 10 years old. Estelle s family then moved to [place] until she was done with college.     FAMILY PSYCHIATRIC HISTORY:   Estelle s father has a history of Depression, Anxiety, and an Alcohol Use Disorder. Her mother has a history of post-partum Depression and her sister has a Depression diagnosis. Her father and sister take Prozac to manage their symptoms.     MEDICAL HISTORY:  Estelle has a generally unremarkable medical history. Estelle has had two knee surgeries and an ACL surgery from sports accidents. Estelle reported that her concentration is largely normal, and that she is feeling tired, but attributes that to being a mother of two. She reported no symptoms of constipation or diarrhea. She indicated that she has had a suppressed appetite and is not hungry. She reported that she feels like she is getting enough nutrition through grazing. In the past Estelle indicated that she was eating due to stress. Estelle reported continued difficulties with sleep. She has a hard time falling asleep because she is thinking through lists of things to do, or playing out conversations with people. She also reported recurrent and vivid nightmares.     MENTAL STATUS EXAM:   Estelle was casually dressed, appropriately groomed and appeared her stated age. Eye contact was appropriate. During the interview, her mood appeared  generally euthymic, but at times she became tearful when discussing the pressures she was feeling to fulfill her roles as well as guilt around delivering her babies early. She was oriented to person, place, and time. She was cooperative and answered the questions asked by the interviewer. Attention and concentration were not formally assessed but Estelle appeared to lose her train of thought at several instances throughout the interview (>10 times). At times she did appear to have a hard time organizing her thoughts. This could be due to a variety of reasons, including effects of depression, and lack of sleep. Insight and judgment were intact. Her speech was normal in tone, rate, and volume. Estelle endorsed interest in changing her medication plan to help alleviate symptoms, as well as exploring options for therapy.      DSM-5 DIAGNOSES:   300.02 (F41.1)  Generalized Anxiety Disorder  296.32 (F33.1)  Major Depressive Disorder, Recurrent Episode, Moderate     PLAN AND RECOMMENDATIONS:    Taken together, the results of this evaluation suggest that Estelle will benefit from working with a therapist, in order to gain skills and support for managing her mental health symptoms. Estelle may also contact her provider about her medication treatment to discuss optimizing her treatment given her ongoing symptoms.     We recommend that Estelle engage with skills-based psychotherapy. We currently have a therapy opening in our clinic with [facility]. She will reach out to you in order to discuss your interest in services and scheduling.     If Estelle prefers a different provider, we recommend looking into the following clinicians:    Joshua George, PhD, LP ([phone]). [email or web address]    Lula Mckinney MA, LM ([phone]). [email or web address]    We recommend that Estelle reach back out to  her sleep specialist to explore options for managing her difficulties with sleep.     Estelle may be interested in joining a facebook support group for women who are also navigating mental difficulties during and post pregnancy: [email or web address]    Estelle may also be interested in other resources at Pregnancy and Postpartum support [place]: [email or web address]-[email or web address]/ and Post[employer]: https://www.postpartum.net/resources/overview/.     Estelle may be interested in joining parenting support groups for parents of multiples: [email or web address]    Estelle can reach back out to our team with any questions or concerns regarding this report and our recommendations.      Stefanie Palencia MA  Practicum Student      I was present for portions of this evaluation and supervised the completion of this report. I agree with the results of the assessment and plan as documented.      Марина Laguerre Psy.D.,   Clinical Supervisor, Department of Psychiatry and Behavioral Sciences

## 2021-07-13 NOTE — TELEPHONE ENCOUNTER
[facility]                                                                 Department of Psychiatry  [phone] (Office)                                                             [address] [place]  [phone] (Clinic)                                                             [address] [place] [address]  [phone] (Fax)                                                                [place]  [address]     [facility]  DIAGNOSTIC EVALUATION AND PSYCHOLOGICAL ASSESSMENT        PATIENT: Estelle Escobar     : 1990     Encounter Date: 21                                        MRN: [id number]     Evaluator: Stefanie Palencia MA, Practicum Student  Supervisor: Марина Laguerre Psy.D., [place]    Psychiatric Diagnostic Evaluation: 2 hours spent with the patient ([id number])      Type of service: Telemedicine Evaluation  Reason for Telemedicine Visit: COVID-19 public health recommendations on in-person sessions  Mode of transmission: Secure real time interactive audio and visual telecommunication system (video conference via [employer])  Location of originating and distant sites:  ? Originating site (patient location): patient home  ? Distant site (provider site): HIPAA compliant location within provider home/remote setting  Telemedicine Visit: The patient's condition can be safely assessed and treated via synchronous audio and visual telemedicine encounter.    Patient has agreed to receiving services via telemedicine technology.     IDENTIFYING DATA:   Estelle Ely is a 30-year-old White-American female who presented for the current evaluation as part of the assessment process for the [facility]. She was seeking care with the [facility] due to the experience of increased anxiety and symptoms of depression since becoming pregnant and giving birth to twin boys in 2020. The following information was obtained through a clinical interview  and chart review. Limits of confidentiality and the purpose of the appointment (diagnostic evaluation) were described at the beginning of the session.      CHIEF COMPLAINT:    Estelle is seeking care with the [facility] team due to the experience of increased symptoms of anxiety and depression since giving birth to her two twin boys in January 2020. She reported that her goals were to receive recommendations about services to address her concerns. Estelle was placed on a 50 mg prescription of Zoloft by her OB/GYN about a month ago. Estelle reported some alleviation of symptoms but indicated that she is still experiencing significant anxiety and down mood. 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      MENTAL HEALTH HISTORY AND DIAGNOSTIC INTERVIEW: Estelle completed a clinical interview and the Mini International Neuropsychiatric Interview (M.I.N.I) to assist with our diagnostic assessment of current psychological functioning.     Estelle noted a considerable increase in symptoms of worry that are hard to control. 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 She reported that this worry has felt like she has a hard time controlling her thoughts, and it has been ongoing for over 6 months. She often feels restless, keyed up, and easily fatigued. She reported that she has tight muscles and jaw pain. Her anxious thoughts are making it hard for her to organize tasks, and making it difficult to fall asleep. Estelle also noted intrusive thoughts and nightmares about bad things happening like her baby or dogs dying.     Estelle reported an increase in down mood over the last 6 months to a year. She reported a lack of interest in doing activities she was once interested in and a lack of motivation to complete tasks. She also indicated increase in guilt - especially  around feeling like she is not fulfilling her roles in her family and job - as well as worthlessness/hopelessness. She indicated swings in energy, where sometimes she feels so exhausted that she cannot do anything. She also reported extreme indecisiveness and a reliance on her  to help her make decisions and help her plan out the tasks she will complete in the day. Estelle reported that before getting an IUD, she experienced mood changes in the week before her period. During this time she felt down and like she lost a lot of self-confidence.     Estelle denied current suicidal ideation and intent. She denied past experiences of trauma and panic attacks. Estelle indicated no symptoms of renay or delusions during our interview. Estelle indicated no repetitive or compulsive behaviors in order to help resist her recurrent anxious thoughts. Estelle denied purging or restrictive eating behaviors. She is 5 6  and at her heaviest was 180 lbs., and at her lowest was 140 lbs. Estelle did indicate some concerns about her weight and her current body image. Estelle denied current substance use concerns, but did report some drinking (1 to 2 drinks nightly).     PREVIOUS PSYCHIATRIC HISTORY:  Estelle reported no previous mental health diagnoses or therapy.      PREGNANCY/BIRTH HISTORY:  Estelle s pregnancy with her two twin boys was her only known pregnancy. This was a planned pregnancy, but she reported that it took her a while to get pregnant. Estelle reported that she was very excited about being pregnant, and cried from being over joyed at finding out she was having twins. She reported that she always wanted twins, but didn t think it was possible since twins did not run in her family. She described the pregnancy as generally easy, but that she did experience a brief  slump  during her pregnancy, that in retrospect she attributes to symptoms of Depression. Estelle also indicated that she was reading a lot of literature about having  multiples, and that some of the books were giving her nightmares. She did report that she has a friend that has also had multiples and has been a continuous source of support.     Estelle reported that her water broke when she was in bed in January (approximately 10 weeks before her due date). At first she found it really funny, because she thought she was urinating on herself. Once she realized what was happening they rushed her to the hospital. At the hospital they discovered that both babies were in breech positions and that they had to do a . Estelle reported that this part of the experience was a  blur . One of her babies spent 10 weeks in the NICU and the other spent 12 weeks. Estelle went to the NICU every day. She reported a very positive experience with the hospital staff, and that some of them have come to visit the boys since being discharged from the NICU. Estelle indicated that she continues to have a sense of guilt because her  body failed at protecting  her children. Estelle breast fed her babies for 15 months, and is not currently breastfeeding. She is not currently planning on having more children.     CURRENT MEDICATIONS:   Esetlle is currently taking 50 mg of Zoloft daily. Her OB/GYN is managing her prescription. She also takes a multi-vitamin and fish oil supplement.     PAST MEDICATIONS:   Estelle has taken Methylphenidate and an unknown medication to help her fall asleep at night. She reported she stopped these before becoming pregnant. Estelle denied taking any additional psychiatric medications for mental health concerns.     SOCIAL HISTORY:   Estelle lives with her  and two children in [place], [place]. 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 Estelle reported that her  is the financial bread winner for the household. She reported that financially they are able to meet  their basic needs but are not currently saving. This has been a source of stress or Estelle and her family. Estelle reported a positive and supportive relationship with her . She also reported a positive and supportive relationship with her  s parents. Her other immediate family is mostly in [place], but she reported that she often talks with her mother. She reported that she can talk with her family about her mental health, and she has a network of friends.     When Estelle was a child her father was an oil rig worker. In order for him to find work, they moved approximately two times a year until she was 10 years old. Estelle s family then moved to [place] until she was done with college.     FAMILY PSYCHIATRIC HISTORY:   Estelle s father has a history of Depression, Anxiety, and an Alcohol Use Disorder. Her mother has a history of post-partum Depression and her sister has a Depression diagnosis. Her father and sister take Prozac to manage their symptoms.     MEDICAL HISTORY:  Estelle has a generally unremarkable medical history. Estelle has had two knee surgeries and an ACL surgery from sports accidents. Estelle reported that her concentration is largely normal, and that she is feeling tired, but attributes that to being a mother of two. She reported no symptoms of constipation or diarrhea. She indicated that she has had a suppressed appetite and is not hungry. She reported that she feels like she is getting enough nutrition through grazing. In the past Estelle indicated that she was eating due to stress. Estelle reported continued difficulties with sleep. She has a hard time falling asleep because she is thinking through lists of things to do, or playing out conversations with people. She also reported recurrent and vivid nightmares.     MENTAL STATUS EXAM:   Estelle was casually dressed, appropriately groomed and appeared her stated age. Eye contact was appropriate. During the interview, her mood appeared  generally euthymic, but at times she became tearful when discussing the pressures she was feeling to fulfill her roles as well as guilt around delivering her babies early. She was oriented to person, place, and time. She was cooperative and answered the questions asked by the interviewer. Attention and concentration were not formally assessed but Estelle appeared to lose her train of thought at several instances throughout the interview (>10 times). At times she did appear to have a hard time organizing her thoughts. This could be due to a variety of reasons, including effects of depression, and lack of sleep. Insight and judgment were intact. Her speech was normal in tone, rate, and volume. Estelle endorsed interest in changing her medication plan to help alleviate symptoms, as well as exploring options for therapy.      DSM-5 DIAGNOSES:   300.02 (F41.1)  Generalized Anxiety Disorder  296.32 (F33.1)  Major Depressive Disorder, Recurrent Episode, Moderate     PLAN AND RECOMMENDATIONS:    Taken together, the results of this evaluation suggest that Estelle will benefit from working with a therapist, in order to gain skills and support for managing her mental health symptoms. Estelle may also contact her provider about her medication treatment to discuss optimizing her treatment given her ongoing symptoms.     We recommend that Estelle engage with skills-based psychotherapy. We currently have a therapy opening in our clinic with Ana Rosa Cleaning. She will reach out to you in order to discuss your interest in services and scheduling.     If Estelle prefers a different provider, we recommend looking into the following clinicians:    Joshua George, PhD, LP ([phone]). [email or web address]    Lula Mckinney MA, LM ([phone]). [email or web address]    We recommend that Estelle reach back out to  her sleep specialist to explore options for managing her difficulties with sleep.     Estelle may be interested in joining a facebook support group for women who are also navigating mental difficulties during and post pregnancy: [email or web address]/[email or web address]    Estelle may also be interested in other resources at Pregnancy and Postpartum support [place]: [email or web address]-[email or web address]-parents/ and Postpartum Support International: https://www.postpartum.net/resources/overview/.     Estelle may be interested in joining parenting support groups for parents of multiples: [email or web address]    Estelle can reach back out to our team with any questions or concerns regarding this report and our recommendations.      Stefanie Palencia MA  Practicum Student      Psychological evaluation services was completed by the trainee under my direct supervision. I saw the patient with the trainee and participated in a portion of the service, including a discussion of the diagnosis, recommendations, and plan. I agree with the findings and plan as documented in this note.     Марина Laguerre PsyD, LP    Department of Psychiatry and Behavioral Sciences  Clinical Supervisor

## 2021-07-15 ENCOUNTER — VIRTUAL VISIT (OUTPATIENT)
Dept: PSYCHIATRY | Facility: CLINIC | Age: 31
End: 2021-07-15
Attending: PSYCHOLOGIST
Payer: COMMERCIAL

## 2021-07-15 DIAGNOSIS — F33.1 MAJOR DEPRESSIVE DISORDER, RECURRENT EPISODE, MODERATE (H): ICD-10-CM

## 2021-07-15 DIAGNOSIS — F41.1 GENERALIZED ANXIETY DISORDER: Primary | ICD-10-CM

## 2021-07-15 PROCEDURE — 90837 PSYTX W PT 60 MINUTES: CPT | Mod: HN | Performed by: PSYCHOLOGIST

## 2021-07-16 NOTE — PROGRESS NOTES
"OUTPATIENT PSYCHOTHERAPY PROGRESS NOTE    Client Name: Estelle Escobar   YOB: 1990 (30 year old)   Date of Service:  Jul 15, 2021  Time of Service: 4:00 to 5:00 (60 minutes)  Service Type(s):33281 psychotherapy (53-60 min. with patient and/or family)    Type of service: Telemedicine Psychotherapy  Reason for Telemedicine Visit: COVID-19 public health recommendations on in-person sessions  Mode of transmission: Secure real time interactive audio and visual telecommunication system (videoconference via [employer])  Location of originating and distant sites:    Originating site (patient location): patient home    Distant site (provider site): HIPAA compliant location within provider home/remote setting  Telemedicine Visit: The patient's condition can be safely assessed and treated via synchronous audio and visual telemedicine encounter.    Patient has agreed to receiving services via telemedicine technology.    Diagnoses:   Encounter Diagnoses   Name Primary?     Generalized anxiety disorder Yes     Major depressive disorder, recurrent episode, moderate (H)        Individuals Present:   Patient    Treatment goal(s) being addressed:   Initial psychotherapy session.    Subjective:  Estelle reported feeling less \"stressed\" since her intake with [facility] on [facility]. The Zoloft seems to be helping, and she has decided to stop working for the time being, with her 's support. While these changes have been positive, Estelle continues to feel overwhelmed and described having trouble moving forward in daily activities. Estelle stated that she wants to make changes with regards to her eating, activity level, and progress on projects around the house.    Treatment:   Estelle participated in an initial session of individual psychotherapy. Topics discussed included stress around roles/responsibilities and goals related to improving her sense of well-being. We reflected on the high expectations Estelle " "has for herself and feelings of anxiety, guilt, and self-criticism that she experiences around these expectations. She described the \"wendie\" that she shows to her children and  but reported struggling to show herself the same. Therapeutic strategies used included validation, reflective listening, and psychoeducation.     Assessment and Progress:   Estelle was pleasant and engaged throughout the session. She was casually dressed and neatly groomed. Her mood was generally euthymic, with one period of tearfulness. She was oriented to person, place, and time. She displayed good insight and judgment. Her speech was normal in tone, rate, and volume. Her thought content was logical and coherent. She did not endorse suicidal ideation or behavior. Estelle indicated a desire to continue therapy and to work towards developing treatment goals at our next session.    Plan:   Estelle was asked to reflect on goals for therapy. As she will be out-of-town next week, our next therapy appointment has been scheduled for Thursday, 7/29 at 4:00pm, when we will develop a treatment plan.      Ana Rosa Cleaning MA  Practicum Student      I did not see this patient directly. This patient was discussed with me in psychotherapy supervision, and I agree with the plan as documented.    Opal Guillen, Ph.D., L.P.  Clinical Supervisor, Department of Psychiatry and Behavioral Sciences    "

## 2021-07-29 ENCOUNTER — VIRTUAL VISIT (OUTPATIENT)
Dept: PSYCHIATRY | Facility: CLINIC | Age: 31
End: 2021-07-29
Attending: PSYCHOLOGIST
Payer: COMMERCIAL

## 2021-07-29 DIAGNOSIS — F41.1 GENERALIZED ANXIETY DISORDER: Primary | ICD-10-CM

## 2021-07-29 DIAGNOSIS — F33.1 MAJOR DEPRESSIVE DISORDER, RECURRENT EPISODE, MODERATE (H): ICD-10-CM

## 2021-07-29 PROCEDURE — 90837 PSYTX W PT 60 MINUTES: CPT | Mod: GT | Performed by: PSYCHOLOGIST

## 2021-07-30 NOTE — PROGRESS NOTES
"OUTPATIENT PSYCHOTHERAPY PROGRESS NOTE    Client Name: Estelle Escobar   YOB: 1990 (30 year old)   Date of Service:  Jul 29, 2021  Time of Service: 4:01 to 5:01 (60 minutes)  Service Type(s):55258 psychotherapy (53-60 min. with patient and/or family)    Type of service: Telemedicine Psychotherapy  Reason for Telemedicine Visit: COVID-19 public health recommendations on in-person sessions  Mode of transmission: Secure real time interactive audio and visual telecommunication system (videoconference via [employer])  Location of originating and distant sites:    Originating site (patient location): patient home    Distant site (provider site): HIPAA compliant location within provider home/remote setting  Telemedicine Visit: The patient's condition can be safely assessed and treated via synchronous audio and visual telemedicine encounter.    Patient has agreed to receiving services via telemedicine technology.    Diagnoses:   Encounter Diagnoses   Name Primary?     Generalized anxiety disorder Yes     Major depressive disorder, recurrent episode, moderate (H)        Individuals Present:   Patient    Treatment goal(s) being addressed:   N/A, treatment goals established at this session.    Subjective:  Estelle described continuing to feel overwhelmed. She detailed times when she is \"in a funk\" and \"can't function\", in which she avoids completing tasks that she \"should\" be doing. She also discussed feeling like she is enjoying things less than she would like to be (like gardening) and a sense that she is never fully satisfied with her efforts. She identified that she would like to \"slow down\" and \"live in the moment\". Estelle asked about possibly increasing her Zoloft dose; this provider encouraged her to contact her prescribing provider for a discussion along these lines.    Treatment:   Estelle participated in a session of individual psychotherapy in the framework of cognitive behavioral therapy. Today's session " "was used to continue to build rapport and to develop treatment goals (see BEH treatment plan). Goals were developed around addressing maladaptive thought patterns, anhedonia, and difficulties with task initiation and completion. We discussed Estelle's \"cycles\" of high expectations/pressure followed by feelings of guilt when she \"procrastinates\" and is not able to meet those high expectations. Estelle provided verbal consent for this treatment plan, as the session took place by telehealth. Therapeutic strategies used included open-ended questioning, reflective listening, validation, and psychoeducation.    Assessment and Progress:   Session was started on time while Estelle was at her home. She was pleasant and engaged throughout the session. She was casually dressed and neatly groomed. Her mood was generally euthymic. She was oriented to person, place, and time. She displayed good insight and judgment. Her speech was normal in tone, rate, and volume. Her thought content was logical and coherent. She did not endorse suicidal ideation or behavior. Estelle actively participated in the development of her treatment goals and indicated a desire to continue therapy and to work towards these goals.    Plan:   Weekly sessions will continue and will focus on treatment goals. As this provider will be out of the office next week, our next therapy appointment has been scheduled for Thursday, 8/12 at 4:00pm.      Ana Rosa Cleaning MA  Practicum Student      I did not see this patient directly. This patient was discussed with me in psychotherapy supervision, and I agree with the plan as documented.    Opal Guillen, Ph.D., L.P.  Clinical Supervisor, Department of Psychiatry and Behavioral Sciences    "

## 2021-08-12 ENCOUNTER — VIRTUAL VISIT (OUTPATIENT)
Dept: PSYCHIATRY | Facility: CLINIC | Age: 31
End: 2021-08-12
Attending: PSYCHOLOGIST
Payer: COMMERCIAL

## 2021-08-12 DIAGNOSIS — F33.1 MAJOR DEPRESSIVE DISORDER, RECURRENT EPISODE, MODERATE (H): ICD-10-CM

## 2021-08-12 DIAGNOSIS — F41.1 GENERALIZED ANXIETY DISORDER: Primary | ICD-10-CM

## 2021-08-12 PROCEDURE — 90837 PSYTX W PT 60 MINUTES: CPT | Mod: HN

## 2021-08-14 NOTE — PROGRESS NOTES
"OUTPATIENT PSYCHOTHERAPY PROGRESS NOTE    Client Name: Estelle Escobar   YOB: 1990 (30 year old)   Date of Service:  August 12, 2021  Time of Service: 4:01 to 4:55 (54 minutes)  Service Type(s):41437 psychotherapy (53-60 min. with patient and/or family)    Type of service: Telemedicine Psychotherapy  Reason for Telemedicine Visit: COVID-19 public health recommendations on in-person sessions  Mode of transmission: Secure real time interactive audio and visual telecommunication system (videoconference via [employer])  Location of originating and distant sites:    Originating site (patient location): patient home    Distant site (provider site): HIPAA compliant location within provider home/remote setting  Telemedicine Visit: The patient's condition can be safely assessed and treated via synchronous audio and visual telemedicine encounter.    Patient has agreed to receiving services via telemedicine technology.    Diagnoses:   Encounter Diagnoses   Name Primary?     Generalized anxiety disorder Yes     Major depressive disorder, recurrent episode, moderate (H)        Individuals Present:   Patient    Treatment goal(s) being addressed:   Behavioral activation    Subjective:  Estelle shared that they hosted Neighbors Night Out last Tuesday, which was nice but stressful. She reported that she has had a social activity every night since and has also been sick, so is feeling tired. The twins are at their grandparents' house until Saturday. Estelle wants to address the \"looming mess\" in the basement. Her neighbors have several family members who are very good with cleaning/organization, so Estelle called them for help, and they are coming on Saturday morning.    Treatment:  Estelle participated in a session of individual psychotherapy in the framework of cognitive behavioral therapy. The CBT model was introduced, along with behavioral activation concepts. Idea of addressing avoidance by approaching was applied to the " basement task, which has been a stressor. Discussed what the avoidance behaviors look like and possibility of breaking the task down into smaller pieces. Also reflected on why she has been avoiding the task- it would mean facing that they have limited space and deciding what to keep versus give away. Discussed what items in basement are and what it would mean to let go of some of them. Discussed identifying values and priorities. Therapeutic strategies used included open-ended questioning, reflective listening, validation, and psychoeducation.     Assessment and Progress:   Session was started on time while Estelle was at her home. She was pleasant and engaged throughout the session. She was casually dressed and neatly groomed. Her mood was generally euthymic. She was oriented to person, place, and time. She displayed good insight and judgment. Her speech was normal in tone, rate, and volume. Her thought content was logical and coherent. She did not endorse suicidal ideation or behavior.    Plan:   Weekly sessions will continue and will focus on treatment goals.Our next therapy appointment is scheduled for Thursday, 8/19 at 4:00pm.       Ana Rosa Cleaning MA  Practicum Student    I did not see this patient directly. This patient was discussed with me in psychotherapy supervision, and I agree with the plan as documented.    Opal Guillen, Ph.D., L.P.  Clinical Supervisor, Department of Psychiatry and Behavioral Sciences

## 2021-08-19 ENCOUNTER — VIRTUAL VISIT (OUTPATIENT)
Dept: PSYCHIATRY | Facility: CLINIC | Age: 31
End: 2021-08-19
Attending: PSYCHOLOGIST
Payer: COMMERCIAL

## 2021-08-19 DIAGNOSIS — F33.1 MAJOR DEPRESSIVE DISORDER, RECURRENT EPISODE, MODERATE (H): ICD-10-CM

## 2021-08-19 DIAGNOSIS — F41.1 GENERALIZED ANXIETY DISORDER: Primary | ICD-10-CM

## 2021-08-19 PROCEDURE — 90837 PSYTX W PT 60 MINUTES: CPT | Mod: HN

## 2021-08-26 ENCOUNTER — VIRTUAL VISIT (OUTPATIENT)
Dept: PSYCHIATRY | Facility: CLINIC | Age: 31
End: 2021-08-26
Attending: PSYCHOLOGIST
Payer: COMMERCIAL

## 2021-08-26 DIAGNOSIS — F41.1 GENERALIZED ANXIETY DISORDER: Primary | ICD-10-CM

## 2021-08-26 DIAGNOSIS — F33.1 MAJOR DEPRESSIVE DISORDER, RECURRENT EPISODE, MODERATE (H): ICD-10-CM

## 2021-08-26 PROCEDURE — 90837 PSYTX W PT 60 MINUTES: CPT | Mod: GT

## 2021-08-28 NOTE — PROGRESS NOTES
"OUTPATIENT PSYCHOTHERAPY PROGRESS NOTE    Client Name: Estelle Escobar   YOB: 1990 (30 year old)   Date of Service:  August 26, 2021  Time of Service: 4:02 to 4:56 (54 minutes)  Service Type(s): 36039 psychotherapy (53-60 min. with patient and/or family)    Type of service: Telemedicine Psychotherapy  Reason for Telemedicine Visit: COVID-19 public health recommendations on in-person sessions  Mode of transmission: Secure real time interactive audio and visual telecommunication system (videoconference via [employer]aborate)  Location of originating and distant sites:    Originating site (patient location): patient home    Distant site (provider site): HIPAA compliant location within provider home/remote setting  Telemedicine Visit: The patient's condition can be safely assessed and treated via synchronous audio and visual telemedicine encounter.    Patient has agreed to receiving services via telemedicine technology.    Diagnoses:   Encounter Diagnoses   Name Primary?     Generalized anxiety disorder Yes     Major depressive disorder, recurrent episode, moderate (H)        Individuals Present:   Patient    Treatment goal(s) being addressed:   Behavioral activation    Subjective:  Estelle described her trip with her twins to visit family in [place], which was good but tiring. One of her sons was teething/feverish and would not let anyone other than Estelle hold him, and the other twin took a while to warm to Estelle's father. Estelle reported she is relieved that the camping trip they were supposed to go on this week was cancelled, as her sister and brother-in-law are coming next weekend, so it will be good to have a break this weekend. Estelle indicated that she has been working to accept she \"can't get it all done\" and that her  is supportive of this.    Treatment:  Estelle participated in a session of individual psychotherapy in the framework of cognitive behavioral therapy. CBT model was reviewed. " Concepts of behavioral activation to reduce avoidance and promote positive activities during periods of depression were discussed. Introduced the idea of making a list to reference that includes activities to increase pleasure and sense of mastery. Discussed Estelle's list of tasks she wanted to accomplish for the day and how she is overwhelmed by it and disappointed/critical of herself when she cannot complete all of the tasks. Introduced activity tracking as a way to determine how long tasks take in order to create daily task lists that are more manageable and achievable. Introduced concept of SMART goals. Therapeutic strategies used included open-ended questioning, reflective listening, validation, and psychoeducation.    Assessment and Progress:   Session was started on time while Estelle was at her home. She was pleasant and engaged throughout the session. She was casually dressed and neatly groomed. Her mood was euthymic. She was oriented to person, place, and time. She displayed good insight and judgment. Her speech was normal in tone, rate, and volume. Her thought content was logical and coherent. She did not endorse suicidal ideation or behavior.    Plan  Encouraged Estelle to practice behavioral activation skills in the coming weeks. Our next therapy appointments appointments are scheduled for Thursday, 9/9 and Thursday, 9/23 at 4:00pm. Upcoming sessions will continue to focus on behavioral activation strategies and will introduce cognitive restructuring.      Ana Rosa Cleaning MA  Practicum Student    I did not see this patient directly. This patient was discussed with me in psychotherapy supervision, and I agree with the plan as documented.    Opal Guillen, Ph.D., L.P.  Clinical Supervisor, Department of Psychiatry and Behavioral Sciences

## 2021-09-09 ENCOUNTER — VIRTUAL VISIT (OUTPATIENT)
Dept: PSYCHIATRY | Facility: CLINIC | Age: 31
End: 2021-09-09
Attending: PSYCHOLOGIST
Payer: COMMERCIAL

## 2021-09-09 DIAGNOSIS — F41.1 GENERALIZED ANXIETY DISORDER: Primary | ICD-10-CM

## 2021-09-09 DIAGNOSIS — F33.1 MAJOR DEPRESSIVE DISORDER, RECURRENT EPISODE, MODERATE (H): ICD-10-CM

## 2021-09-09 PROCEDURE — 90837 PSYTX W PT 60 MINUTES: CPT | Mod: GT

## 2021-09-13 NOTE — PROGRESS NOTES
"OUTPATIENT PSYCHOTHERAPY PROGRESS NOTE    Client Name: Estelle Escobar   YOB: 1990 (30 year old)   Date of Service:  September 9, 2021  Time of Service: 4:02 to 4:58 (56 minutes)  Service Type(s): 89967 psychotherapy (53-60 min. with patient and/or family)    Type of service: Telemedicine Psychotherapy  Reason for Telemedicine Visit: COVID-19 public health recommendations on in-person sessions  Mode of transmission: Secure real time interactive audio and visual telecommunication system (videoconference via [employer])  Location of originating and distant sites:    Originating site (patient location): patient home    Distant site (provider site): HIPAA compliant location within provider home/remote setting  Telemedicine Visit: The patient's condition can be safely assessed and treated via synchronous audio and visual telemedicine encounter.    Patient has agreed to receiving services via telemedicine technology.    Diagnoses:   Encounter Diagnoses   Name Primary?     Generalized anxiety disorder Yes     Major depressive disorder, recurrent episode, moderate (H)        Individuals Present:   Patient    Treatment goal(s) being addressed:   Behavioral activation; cognitive reframing    Subjective:  Estelle described a situation which occurred recently in which she became dysregulated while at home by herself. Her  had been catarino earlier, so the kitchen was a \"mess.\" Estelle was looking for a tape measure and was unable to find one despite knowing there were three in the house. She became \"irate\" and started screaming and swearing, then threw something at the mess in the kitchen which broke. She stated that she started crying and thought, \"I can't live like this.\" She described feeling \"trapped\" and having pessimistic thoughts about the future in this moment. She reported that she then called her  who suggested she take a break. Estelle reported she then read for an hour and a half which allowed " "her to \"re-center.\" She was able to do work for 3 hours and \"get a ton done.\"     Treatment:  Estelle participated in a session of individual psychotherapy in the framework of cognitive behavioral therapy. Provider praised Estelle for her success in regulating herself after her outburst of anger with a pleasurable activity, which allowed her to be effective the rest of the day. Processed what was behind the anger. Estelle stated that the last 2 months have been chaotic, and the stress was \"built up\" and \"compounding.\" Discussed scheduling breaks and pleasurable activities to prevent this. Discussed Estelle's need to \"ask my  permission\" to take a break. Linked to introducing concept of cognitive reframing around some of Estelle's unhelpful thoughts. Therapeutic strategies used included open-ended questioning, reflective listening, validation, and psychoeducation.    Assessment and Progress:   Session was started on time while Estelle was at her home. We had some technical difficulties early in the session, so Estelle moved to her basement for the remainder of the session. Estelle was engaged and cooperative throughout the session. She was casually dressed and neatly groomed. Her mood appeared more down than in previous sessions. She was oriented to person, place, and time. She displayed good insight and judgment. Her speech was normal in tone, rate, and volume. Her thought content was logical and coherent. She did not endorse suicidal ideation or behavior.    Plan  Encouraged Estelle to continue to practice behavioral activation skills in the coming weeks. Our next therapy appointments appointment is scheduled for Thursday, 9/23 at 4:00pm. Upcoming session will focus on cognitive reframing.      Ana Rosa Cleaning MA  Practicum Student    I did not see this patient directly. This patient was discussed with me in psychotherapy supervision, and I agree with the plan as documented.    Opal Guillen, Ph.D., L.P.  Clinical " Supervisor, Department of Psychiatry and Behavioral Sciences

## 2021-09-23 ENCOUNTER — VIRTUAL VISIT (OUTPATIENT)
Dept: PSYCHIATRY | Facility: CLINIC | Age: 31
End: 2021-09-23
Attending: PSYCHOLOGIST
Payer: COMMERCIAL

## 2021-09-23 DIAGNOSIS — F33.1 MAJOR DEPRESSIVE DISORDER, RECURRENT EPISODE, MODERATE (H): ICD-10-CM

## 2021-09-23 DIAGNOSIS — F41.1 GENERALIZED ANXIETY DISORDER: Primary | ICD-10-CM

## 2021-09-23 PROCEDURE — 90837 PSYTX W PT 60 MINUTES: CPT | Mod: HN

## 2021-09-27 ENCOUNTER — TELEPHONE (OUTPATIENT)
Dept: OBGYN | Facility: CLINIC | Age: 31
End: 2021-09-27

## 2021-09-27 NOTE — TELEPHONE ENCOUNTER
----- Message from Carlotta Price RN sent at 9/27/2021 12:28 PM CDT -----  Regarding: Concern regarding bleeding after anal intercourse  Has noted continued bleeding after bowel movements. Is OB the right doctor to call about this? Says she doesn't have a PCP.

## 2021-09-27 NOTE — TELEPHONE ENCOUNTER
Tried to reach Racquel, but received voicemail.  Left message to check mychart, and call back if additional questions.

## 2021-09-30 NOTE — PROGRESS NOTES
"OUTPATIENT PSYCHOTHERAPY PROGRESS NOTE    Client Name: Estelle Escobar   YOB: 1990 (30 year old)   Date of Service:  September 23, 2021  Time of Service: 4:03 to 5:01 (58 minutes)  Service Type(s): 63930 psychotherapy (53-60 min. with patient and/or family)    Type of service: Telemedicine Psychotherapy  Reason for Telemedicine Visit: COVID-19 public health recommendations on in-person sessions  Mode of transmission: Secure real time interactive audio and visual telecommunication system (videoconference via [employer])  Location of originating and distant sites:    Originating site (patient location): patient home    Distant site (provider site): HIPAA compliant location within provider home/remote setting  Telemedicine Visit: The patient's condition can be safely assessed and treated via synchronous audio and visual telemedicine encounter.    Patient has agreed to receiving services via telemedicine technology.    Diagnoses:   Encounter Diagnoses   Name Primary?     Generalized anxiety disorder Yes     Major depressive disorder, recurrent episode, moderate (H)        Individuals Present:   Patient    Treatment goal(s) being addressed:   Relaxation/mindfulness; cognitive reframing    Subjective:  Estelle expressed feeling overwhelmed and anxious, including regarding preparations that need to be made for the family's upcoming house renovation. She stated that she has had trouble sleeping and difficulty turning off her thoughts, which she described as a \"hamster wheel.\" Estelle shared that she heard about an activity involving generating a list of priorities in life and selecting the top 5 to focus on. She and her  are making separate lists and then will be coming together to combine them and pick their top 5. 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    Treatment:  Estelle participated in a session of " "individual psychotherapy in the framework of cognitive behavioral therapy. Discussed Estelle's feelings of overwhelm and anxiety. Validated and reflected on the stress/responsibility of caring for two toddlers while trying to accomplish other big tasks. Discussed asking for help; Estelle indicated she feels able to do so. Provider described relaxation strategies (e.g., diaphragmatic breathing and guided imagery) and encouraged Estelle try to practice them for a few minutes per day, even when not feeling particularly distressed. Discussed Estelle's \"to do\" list and idea of prioritizing based on her/her 's values. Introduced concept of \"automatic thoughts\" and provided examples (e.g., statements Estelle has previously made which suggest all-or-nothing/black-and-white thinking). Therapeutic strategies used included open-ended questioning, reflective listening, validation, and psychoeducation.    Assessment and Progress:   Session was started on time while Estelle was at her home. Estelle was engaged and cooperative throughout the session. She was casually dressed and neatly groomed. Her mood appeared distressed at times. She was oriented to person, place, and time. She displayed good insight and judgment. Her speech was normal in tone, rate, and volume. Her thought content was logical and coherent. She did not endorse suicidal ideation or behavior.    Plan  Encouraged Estelle to try relaxation exercises in the coming weeks (emailed her examples). Estelle requested that our next therapy appointments appointment be scheduled for Thursday, 10/21 at 4:00pm due to upcoming commitments. Next sessions will focus on cognitive reframing.      Ana Rosa Cleaning MA  Practicum Student     I did not see this patient directly. This patient was discussed with me in psychotherapy supervision, and I agree with the plan as documented.    Opal Guillen, Ph.D., L.P.  Clinical Supervisor, Department of Psychiatry and Behavioral Sciences    "

## 2021-10-02 ENCOUNTER — HEALTH MAINTENANCE LETTER (OUTPATIENT)
Age: 31
End: 2021-10-02

## 2021-10-21 ENCOUNTER — VIRTUAL VISIT (OUTPATIENT)
Dept: PSYCHIATRY | Facility: CLINIC | Age: 31
End: 2021-10-21
Attending: PSYCHOLOGIST
Payer: COMMERCIAL

## 2021-10-21 DIAGNOSIS — F41.1 GENERALIZED ANXIETY DISORDER: Primary | ICD-10-CM

## 2021-10-21 DIAGNOSIS — F33.1 MAJOR DEPRESSIVE DISORDER, RECURRENT EPISODE, MODERATE (H): ICD-10-CM

## 2021-10-21 PROCEDURE — 90837 PSYTX W PT 60 MINUTES: CPT | Mod: HN

## 2021-10-21 NOTE — PROGRESS NOTES
OUTPATIENT PSYCHOTHERAPY PROGRESS NOTE    Client Name: Estelle Escobar   YOB: 1990 (30 year old)   Date of Service:  October 21, 2021  Time of Service: 4:02 to 4:55 (53 minutes)  Service Type(s): 51649 psychotherapy (53-60 min. with patient and/or family)    Type of service: Telemedicine Psychotherapy  Reason for Telemedicine Visit: COVID-19 public health recommendations on in-person sessions  Mode of transmission: Secure real time interactive audio and visual telecommunication system (videoconference via [employer])  Location of originating and distant sites:    Originating site (patient location): patient home    Distant site (provider site): HIPAA compliant location within provider home/remote setting  Telemedicine Visit: The patient's condition can be safely assessed and treated via synchronous audio and visual telemedicine encounter.    Patient has agreed to receiving services via telemedicine technology.    Diagnoses:   Encounter Diagnoses   Name Primary?     Generalized anxiety disorder Yes     Major depressive disorder, recurrent episode, moderate (H)        Individuals Present:   Patient    Treatment goal(s) being addressed:   Behavioral activation; cognitive reframing    Subjective:  Estelle reported that things have been going well with planning around the house for their upcoming renovation. She reported that her mood has been good, and she did not recall any periods of significant anxiety. She described one episode of tearfulness around deciding whether to get rid of her climbing gear while she and her  were going through items in the basement to get rid of, and she described their ensuing conversation around the importance/benefits of climbing for Estelle.    Treatment:  Estelle participated in a session of individual psychotherapy in the framework of cognitive behavioral therapy. Discussed all-or-nothing/black-and-white thinking in the context of finishing tasks. Estelle indicated she  "and her  often finish tasks \"95%.\" Discussed ideas around avoidance of finishing (possibly due to risk that it won't be perfect) and acceptance of 95% finished as finished (\"good enough\"). Estelle used the analogy of \"grading on a curve.\" Discussed emotions around wanting to keep climbing gear versus get rid of it. Discussed moment when Estelle felt anger/frustration and wanted to throw something but resisted. Provider praised Estelle for her ability to \"create space\" from the thought and decide how to respond. Applied behavioral activation concepts to Estelle's plans to schedule time to go climbing and to get out more with her kids (e.g., she recently took them to a new park and purchased monthly passes to [place]). Discussed planning for winter when Estelle has historically experienced periods of lower mood. Therapeutic strategies used included open-ended questioning, reflective listening, validation, and psychoeducation.    Assessment and Progress:   Session was started on time while Estelle was at her home. Estelle was engaged and cooperative throughout the session. She was casually dressed and neatly groomed. Her mood appeared euthymic throughout the session. She was oriented to person, place, and time. She displayed good insight and judgment. Her speech was normal in tone, rate, and volume. Her thought content was logical and coherent. She did not endorse suicidal ideation or behavior.    Plan:  Discussed options for treatment moving forward. Given that Estelle feels she is doing well, she decided to step back from therapy for now, with the knowledge that she can reach out to me at any time to reestablish treatment. It has been a pleasure working with Estelle, and I would be happy to see her again if desired.      Ana Rosa Cleaning MA  Practicum Student     I did not see this patient directly. This patient was discussed with me in psychotherapy supervision, and I agree with the plan as documented.    Opal Guillen, " Ph.D., L.P.  Clinical Supervisor, Department of Psychiatry and Behavioral Sciences

## 2021-12-11 ENCOUNTER — HOSPITAL ENCOUNTER (EMERGENCY)
Facility: CLINIC | Age: 31
Discharge: HOME | End: 2021-12-12
Attending: FAMILY MEDICINE | Admitting: FAMILY MEDICINE
Payer: COMMERCIAL

## 2021-12-11 DIAGNOSIS — E87.6 HYPOKALEMIA: ICD-10-CM

## 2021-12-11 DIAGNOSIS — R74.01 TRANSAMINITIS: ICD-10-CM

## 2021-12-11 DIAGNOSIS — E80.6 HYPERBILIRUBINEMIA: ICD-10-CM

## 2021-12-11 LAB
ALBUMIN SERPL-MCNC: 3.6 G/DL (ref 3.4–5)
ALP SERPL-CCNC: 199 U/L (ref 40–150)
ALT SERPL W P-5'-P-CCNC: 169 U/L (ref 0–50)
ANION GAP SERPL CALCULATED.3IONS-SCNC: 9 MMOL/L (ref 3–14)
AST SERPL W P-5'-P-CCNC: 84 U/L (ref 0–45)
BASOPHILS # BLD AUTO: 0 10E3/UL (ref 0–0.2)
BASOPHILS NFR BLD AUTO: 0 %
BILIRUB DIRECT SERPL-MCNC: 2.7 MG/DL (ref 0–0.2)
BILIRUB SERPL-MCNC: 3.1 MG/DL (ref 0.2–1.3)
BUN SERPL-MCNC: 7 MG/DL (ref 7–30)
CALCIUM SERPL-MCNC: 8.9 MG/DL (ref 8.5–10.1)
CHLORIDE BLD-SCNC: 106 MMOL/L (ref 94–109)
CO2 SERPL-SCNC: 24 MMOL/L (ref 20–32)
CREAT SERPL-MCNC: 0.55 MG/DL (ref 0.52–1.04)
DEPRECATED S PYO AG THROAT QL EIA: NEGATIVE
EOSINOPHIL # BLD AUTO: 0.3 10E3/UL (ref 0–0.7)
EOSINOPHIL NFR BLD AUTO: 5 %
ERYTHROCYTE [DISTWIDTH] IN BLOOD BY AUTOMATED COUNT: 12 % (ref 10–15)
FLUAV RNA SPEC QL NAA+PROBE: NEGATIVE
FLUBV RNA RESP QL NAA+PROBE: NEGATIVE
GFR SERPL CREATININE-BSD FRML MDRD: >90 ML/MIN/1.73M2
GLUCOSE BLD-MCNC: 100 MG/DL (ref 70–99)
HCT VFR BLD AUTO: 36.6 % (ref 35–47)
HGB BLD-MCNC: 12.2 G/DL (ref 11.7–15.7)
IMM GRANULOCYTES # BLD: 0 10E3/UL
IMM GRANULOCYTES NFR BLD: 0 %
INR PPP: 0.97 (ref 0.86–1.14)
LACTATE SERPL-SCNC: 1.1 MMOL/L (ref 0.7–2)
LIPASE SERPL-CCNC: 100 U/L (ref 73–393)
LYMPHOCYTES # BLD AUTO: 0.8 10E3/UL (ref 0.8–5.3)
LYMPHOCYTES NFR BLD AUTO: 15 %
MCH RBC QN AUTO: 30.4 PG (ref 26.5–33)
MCHC RBC AUTO-ENTMCNC: 33.3 G/DL (ref 31.5–36.5)
MCV RBC AUTO: 91 FL (ref 78–100)
MONOCYTES # BLD AUTO: 0.3 10E3/UL (ref 0–1.3)
MONOCYTES NFR BLD AUTO: 7 %
MONOCYTES NFR BLD AUTO: NEGATIVE %
NEUTROPHILS # BLD AUTO: 3.8 10E3/UL (ref 1.6–8.3)
NEUTROPHILS NFR BLD AUTO: 73 %
NRBC # BLD AUTO: 0 10E3/UL
NRBC BLD AUTO-RTO: 0 /100
PLATELET # BLD AUTO: 219 10E3/UL (ref 150–450)
POTASSIUM BLD-SCNC: 3.1 MMOL/L (ref 3.4–5.3)
PROT SERPL-MCNC: 7.1 G/DL (ref 6.8–8.8)
RBC # BLD AUTO: 4.01 10E6/UL (ref 3.8–5.2)
RSV RNA SPEC NAA+PROBE: NEGATIVE
SARS-COV-2 RNA RESP QL NAA+PROBE: NEGATIVE
SODIUM SERPL-SCNC: 139 MMOL/L (ref 133–144)
T4 FREE SERPL-MCNC: 1.13 NG/DL (ref 0.76–1.46)
TSH SERPL DL<=0.005 MIU/L-ACNC: 4.11 MU/L (ref 0.4–4)
WBC # BLD AUTO: 5.3 10E3/UL (ref 4–11)

## 2021-12-11 PROCEDURE — 85025 COMPLETE CBC W/AUTO DIFF WBC: CPT | Performed by: FAMILY MEDICINE

## 2021-12-11 PROCEDURE — 36415 COLL VENOUS BLD VENIPUNCTURE: CPT | Performed by: FAMILY MEDICINE

## 2021-12-11 PROCEDURE — 80048 BASIC METABOLIC PNL TOTAL CA: CPT | Performed by: FAMILY MEDICINE

## 2021-12-11 PROCEDURE — 83690 ASSAY OF LIPASE: CPT | Performed by: FAMILY MEDICINE

## 2021-12-11 PROCEDURE — C9803 HOPD COVID-19 SPEC COLLECT: HCPCS | Performed by: FAMILY MEDICINE

## 2021-12-11 PROCEDURE — 99285 EMERGENCY DEPT VISIT HI MDM: CPT | Performed by: FAMILY MEDICINE

## 2021-12-11 PROCEDURE — 99285 EMERGENCY DEPT VISIT HI MDM: CPT | Mod: 25 | Performed by: FAMILY MEDICINE

## 2021-12-11 PROCEDURE — 258N000003 HC RX IP 258 OP 636: Performed by: FAMILY MEDICINE

## 2021-12-11 PROCEDURE — 85610 PROTHROMBIN TIME: CPT | Performed by: FAMILY MEDICINE

## 2021-12-11 PROCEDURE — 87637 SARSCOV2&INF A&B&RSV AMP PRB: CPT | Performed by: FAMILY MEDICINE

## 2021-12-11 PROCEDURE — 82248 BILIRUBIN DIRECT: CPT | Performed by: FAMILY MEDICINE

## 2021-12-11 PROCEDURE — 96361 HYDRATE IV INFUSION ADD-ON: CPT | Performed by: FAMILY MEDICINE

## 2021-12-11 PROCEDURE — 84439 ASSAY OF FREE THYROXINE: CPT | Performed by: FAMILY MEDICINE

## 2021-12-11 PROCEDURE — 83605 ASSAY OF LACTIC ACID: CPT | Performed by: FAMILY MEDICINE

## 2021-12-11 PROCEDURE — 87651 STREP A DNA AMP PROBE: CPT | Performed by: FAMILY MEDICINE

## 2021-12-11 PROCEDURE — 86308 HETEROPHILE ANTIBODY SCREEN: CPT | Performed by: FAMILY MEDICINE

## 2021-12-11 PROCEDURE — 96360 HYDRATION IV INFUSION INIT: CPT | Mod: 59 | Performed by: FAMILY MEDICINE

## 2021-12-11 PROCEDURE — 84443 ASSAY THYROID STIM HORMONE: CPT | Performed by: FAMILY MEDICINE

## 2021-12-11 RX ORDER — SODIUM CHLORIDE 9 MG/ML
INJECTION, SOLUTION INTRAVENOUS CONTINUOUS
Status: DISCONTINUED | OUTPATIENT
Start: 2021-12-11 | End: 2021-12-12 | Stop reason: HOSPADM

## 2021-12-11 RX ADMIN — SODIUM CHLORIDE: 9 INJECTION, SOLUTION INTRAVENOUS at 23:05

## 2021-12-12 ENCOUNTER — APPOINTMENT (OUTPATIENT)
Dept: CT IMAGING | Facility: CLINIC | Age: 31
End: 2021-12-12
Attending: EMERGENCY MEDICINE
Payer: COMMERCIAL

## 2021-12-12 ENCOUNTER — APPOINTMENT (OUTPATIENT)
Dept: ULTRASOUND IMAGING | Facility: CLINIC | Age: 31
End: 2021-12-12
Attending: FAMILY MEDICINE
Payer: COMMERCIAL

## 2021-12-12 VITALS
SYSTOLIC BLOOD PRESSURE: 114 MMHG | DIASTOLIC BLOOD PRESSURE: 69 MMHG | HEART RATE: 79 BPM | BODY MASS INDEX: 27.37 KG/M2 | RESPIRATION RATE: 18 BRPM | TEMPERATURE: 98.2 F | WEIGHT: 169.6 LBS | OXYGEN SATURATION: 100 %

## 2021-12-12 LAB
GROUP A STREP BY PCR: NOT DETECTED
HCG UR QL: NEGATIVE
INTERNAL QC OK POCT: NORMAL

## 2021-12-12 PROCEDURE — 74174 CTA ABD&PLVS W/CONTRAST: CPT

## 2021-12-12 PROCEDURE — 250N000009 HC RX 250: Performed by: EMERGENCY MEDICINE

## 2021-12-12 PROCEDURE — 76705 ECHO EXAM OF ABDOMEN: CPT

## 2021-12-12 PROCEDURE — 81025 URINE PREGNANCY TEST: CPT | Performed by: EMERGENCY MEDICINE

## 2021-12-12 PROCEDURE — 250N000013 HC RX MED GY IP 250 OP 250 PS 637: Performed by: EMERGENCY MEDICINE

## 2021-12-12 PROCEDURE — 250N000011 HC RX IP 250 OP 636: Performed by: EMERGENCY MEDICINE

## 2021-12-12 RX ORDER — POTASSIUM CHLORIDE 20MEQ/15ML
40 LIQUID (ML) ORAL ONCE
Status: COMPLETED | OUTPATIENT
Start: 2021-12-12 | End: 2021-12-12

## 2021-12-12 RX ORDER — IOPAMIDOL 755 MG/ML
100 INJECTION, SOLUTION INTRAVASCULAR ONCE
Status: COMPLETED | OUTPATIENT
Start: 2021-12-12 | End: 2021-12-12

## 2021-12-12 RX ADMIN — POTASSIUM CHLORIDE 40 MEQ: 1.5 SOLUTION ORAL at 02:05

## 2021-12-12 RX ADMIN — SODIUM CHLORIDE 50 ML: 9 INJECTION, SOLUTION INTRAVENOUS at 02:19

## 2021-12-12 RX ADMIN — IOPAMIDOL 80 ML: 755 INJECTION, SOLUTION INTRAVENOUS at 02:19

## 2021-12-12 NOTE — ED TRIAGE NOTES
Pt states she has been having symptoms since Wednesday night 12/8/21; Pt went to urgent care and had recommended pt to come for kidney and liver evaluation.  Pt states she has sore throat and needs strep test. Pt has taken Iburpofen has not been working for lower back pain/generalized body aches, Pt has Rash/ on hands/face/neck.  Pt has swollen left hand, difficulty sleeping and has body chills and sweats, pt also mentioned that eyes appear more yellow.  Ketones/proteins and bilirubin levels were elevated via urine test at .

## 2021-12-12 NOTE — ED NOTES
4:43 AM this is a 31-year-old female with significant past medical history who was signed out to me pending results.  She was found to have elevated bilirubin ALT AST and alkaline phosphatase.  Her work-up was comprehensive mono was negative ultrasound showed no evidence for gallstones there was question of a venous thrombus we then proceeded to CT of the abdomen with IV contrast that was time for arterial thrombus evaluation.  The only finding was a calcification associated with the right renal vein which could have been what was seen on the ultrasound.  She is otherwise well and will discharge home with the recommendation to have her LFTs rechecked on Monday or Tuesday at her primary clinic if she is unable to get in she should return to the emergency department.  In the meantime if she gets significantly worse she should return to the ER.  I recommended no Tylenol and no alcohol.     Rashaun Hein MD  12/12/21 [id number]

## 2021-12-12 NOTE — DISCHARGE INSTRUCTIONS
No definite cause for your abnormal labs: Bilirubin, ALT, AST, alkaline phosphatase.  I recommend that you have these rechecked Monday or Tuesday in your clinic  In the meantime if your symptoms worsen or you are unable to get into your clinic return to the ER on Monday or  Tuesday to recheck your labs.  Do not take Tylenol or drink alcohol

## 2021-12-12 NOTE — ED PROVIDER NOTES
ED Provider Note  [facility]      History     Chief Complaint   Patient presents with     Generalized Body Aches     Pharyngitis     Covid 19 Testing     Abnormal Labs     Jaundice     HPI  Estelle Escobar is a 31 year old female who Zentz with multiple complaints.  Patient states she started feeling poorly 4 days ago when she was doing some remodeling in her kitchen throughout the day, mainly sanding cabinets.  That night she felt fatigued, myalgias, generalized weakness and developed a diffuse itching swollen rash on both arms and her cheeks.  The following day she developed a sore throat, worsening myalgias, and malaise.  No cough, fever, shortness of breath, chest pain, abdominal pain, nausea vomiting or diarrhea.  Patient did have a GI illness which lasted several weeks but ended 3 weeks ago, characterized mostly as vomiting and diarrhea.  She drinks occasional alcohol but is not drinking this week.  No street drug use.  Had symptoms of a UTI earlier in the week which she treated with over-the-counter medication and drinking fluids.  She went to an urgent care where they noted slight scleral icterus and elevated bilirubin in her urine and referred her to the ED.    Past Medical History  Past Medical History:   Diagnosis Date     Abnormal Pap smear of cervix 2013     Past Surgical History:   Procedure Laterality Date     ARTHROSCOPIC RECONSTRUCTION ANTERIOR CRUCIATE LIGAMENT        SECTION N/A 2020    Procedure:  SECTION;  Surgeon: Alina Gama MD;  Location: [facility] L+D     EXTRACTION(S) DENTAL       LEEP TX, CERVICAL  2016     sertraline (ZOLOFT) 50 MG tablet  acetaminophen (TYLENOL) 325 MG tablet  ferrous sulfate (FEROSUL) 325 (65 Fe) MG tablet  Prenatal MV-Min-Fe Fum-FA-DHA (PRENATAL 1 PO)      No Known Allergies  Family History  Family History   Problem Relation Age of Onset     Cancer Maternal Grandfather         basal cell      Skin  Cancer Maternal Grandfather      Diabetes Paternal Grandmother      Diabetes Paternal Grandfather      Social History   Social History     Tobacco Use     Smoking status: Never Smoker     Smokeless tobacco: Never Used   Substance Use Topics     Alcohol use: Yes     Alcohol/week: 0.0 - 1.0 standard drinks     Drug use: No      Past medical history, past surgical history, medications, allergies, family history, and social history were reviewed with the patient. No additional pertinent items.       Review of Systems  A complete review of systems was performed with pertinent positives and negatives noted in the HPI, and all other systems negative.    Physical Exam   BP: 113/64  Pulse: 84  Temp: 98.2  F (36.8  C)  Resp: 12  Weight: 76.9 kg (169 lb 9.6 oz)  SpO2: 99 %  Physical Exam  Vitals and nursing note reviewed.   Constitutional:       General: She is not in acute distress.     Appearance: She is not diaphoretic.   HENT:      Head: Atraumatic.      Mouth/Throat:      Pharynx: No oropharyngeal exudate.   Eyes:      General: Scleral icterus present.      Pupils: Pupils are equal, round, and reactive to light.   Cardiovascular:      Heart sounds: Normal heart sounds.   Pulmonary:      Effort: No respiratory distress.      Breath sounds: Normal breath sounds.   Abdominal:      General: Bowel sounds are normal.      Palpations: Abdomen is soft.      Tenderness: There is no abdominal tenderness.   Musculoskeletal:         General: No tenderness.      Cervical back: Neck supple.   Skin:     General: Skin is warm.      Findings: Rash (Maculopapular rash on both upper extremities and cheeks) present.   Neurological:      General: No focal deficit present.      Mental Status: She is oriented to person, place, and time.         ED Course      Procedures       The medical record was reviewed and interpreted.  Current labs reviewed and interpreted.  Previous labs reviewed and interpreted.  Current images reviewed and interpreted:  Right upper quadrant ultrasound.              Results for orders placed or performed during the hospital encounter of 12/11/21   Abdomen US, limited (RUQ only)     Status: None    Narrative    EXAM: ULTRASOUND ABDOMEN LIMITED - RIGHT UPPER QUADRANT  LOCATION: [facility]  DATE/TIME: 12/12/2021 12:16 AM    INDICATION: Malaise. Hyperbilirubinemia. Transaminitis.  COMPARISON: None.    TECHNIQUE: Limited abdominal ultrasound.    FINDINGS:    GALLBLADDER: Unremarkable, without gallstones, wall thickening or pericholecystic fluid. No focal tenderness over the gallbladder.    BILE DUCTS: No intra or extrahepatic biliary dilatation. The common duct measures 0.3 cm in diameter.    LIVER: The liver is prominent in size. Normal echogenicity. No visualized masses.    RIGHT KIDNEY: 10.9 cm in length. Normal echogenicity. No intrarenal collecting system dilatation, calculi or masses.     OTHER: No free fluid in the upper right hemiabdomen. A small 2 cm eccentric echogenic structure is present within the infrahepatic inferior vena cava.      Impression    IMPRESSION:   1. A small eccentric echogenic structure is present within the infrahepatic inferior vena cava. This is nonspecific, but could represent a nonocclusive thrombus. A CT or MR could be considered for further evaluation.  2. The liver is prominent in size. Otherwise, unremarkable appearance of the gallbladder and liver.  3. No biliary dilatation.   CTA Abdomen Pelvis with Contrast     Status: None    Narrative    EXAM: CTA ABDOMEN PELVIS WITH CONTRAST  LOCATION: [facility]  DATE/TIME: 12/12/2021 2:16 AM    INDICATION: Malaise. Hyperbilirubinemia and transaminitis.  COMPARISON: Ultrasound 12/12/2021  TECHNIQUE: CT angiogram abdomen pelvis during arterial phase of injection of IV contrast. 2D and 3D MIP reconstructions were performed by the CT technologist. Dose reduction techniques  were used.  CONTRAST: 80 mls of Isov 370    FINDINGS:  ANGIOGRAM ABDOMEN/PELVIS: The celiac, superior mesenteric, renal and inferior mesenteric arteries are patent. Normal caliber abdominal aorta. Venous structures/IVC are not opacified on this exam. 1.7 x 0.5 cm calcification confluence of right renal vein   and subhepatic IVC coronal image 56.    LOWER CHEST: Bibasilar emphysema.    HEPATOBILIARY: Hepatic steatosis. Mild hepatic megaly. Gallbladder is contracted. No biliary dilatation.    PANCREAS: Normal.    SPLEEN: Mildly prominent.    ADRENAL GLANDS: Normal.    KIDNEYS/BLADDER: Normal.    BOWEL: Normal caliber.    LYMPH NODES: Normal.    PELVIC ORGANS: IUD. Trace amount of pelvic free fluid.    MUSCULOSKELETAL: Normal.      Impression    IMPRESSION:  1.  Hepatic steatosis and mild hepatomegaly. Spleen is prominent. Correlate for hepatic parenchymal disease.  2.   1.7 x 0.5 cm calcification at the confluence of the right renal vein and IVC presumably chronic, possibly related to prior thrombosis. Venous structures are not opacified on this arterial exam reducing evaluation for venous thrombosis.  3.  Trace amount of pelvic free fluid.   Basic metabolic panel     Status: Abnormal   Result Value Ref Range    Sodium 139 133 - 144 mmol/L    Potassium 3.1 (L) 3.4 - 5.3 mmol/L    Chloride 106 94 - 109 mmol/L    Carbon Dioxide (CO2) 24 20 - 32 mmol/L    Anion Gap 9 3 - 14 mmol/L    Urea Nitrogen 7 7 - 30 mg/dL    Creatinine 0.55 0.52 - 1.04 mg/dL    Calcium 8.9 8.5 - 10.1 mg/dL    Glucose 100 (H) 70 - 99 mg/dL    GFR Estimate >90 >60 mL/min/1.73m2   Hepatic panel     Status: Abnormal   Result Value Ref Range    Bilirubin Total 3.1 (H) 0.2 - 1.3 mg/dL    Bilirubin Direct 2.7 (H) 0.0 - 0.2 mg/dL    Protein Total 7.1 6.8 - 8.8 g/dL    Albumin 3.6 3.4 - 5.0 g/dL    Alkaline Phosphatase 199 (H) 40 - 150 U/L    AST 84 (H) 0 - 45 U/L     (H) 0 - 50 U/L   Mononucleosis screen     Status: Normal   Result Value Ref Range     Mononucleosis Screen Negative Negative   INR     Status: Normal   Result Value Ref Range    INR 0.97 0.86 - 1.14   Lipase     Status: Normal   Result Value Ref Range    Lipase 100 73 - 393 U/L   Lactic acid whole blood     Status: Normal   Result Value Ref Range    Lactic Acid 1.1 0.7 - 2.0 mmol/L   TSH with free T4 reflex     Status: Abnormal   Result Value Ref Range    TSH 4.11 (H) 0.40 - 4.00 mU/L   CBC with platelets and differential     Status: None   Result Value Ref Range    WBC Count 5.3 4.0 - 11.0 10e3/uL    RBC Count 4.01 3.80 - 5.20 10e6/uL    Hemoglobin 12.2 11.7 - 15.7 g/dL    Hematocrit 36.6 35.0 - 47.0 %    MCV 91 78 - 100 fL    MCH 30.4 26.5 - 33.0 pg    MCHC 33.3 31.5 - 36.5 g/dL    RDW 12.0 10.0 - 15.0 %    Platelet Count 219 150 - 450 10e3/uL    % Neutrophils 73 %    % Lymphocytes 15 %    % Monocytes 7 %    % Eosinophils 5 %    % Basophils 0 %    % Immature Granulocytes 0 %    NRBCs per 100 WBC 0 <1 /100    Absolute Neutrophils 3.8 1.6 - 8.3 10e3/uL    Absolute Lymphocytes 0.8 0.8 - 5.3 10e3/uL    Absolute Monocytes 0.3 0.0 - 1.3 10e3/uL    Absolute Eosinophils 0.3 0.0 - 0.7 10e3/uL    Absolute Basophils 0.0 0.0 - 0.2 10e3/uL    Absolute Immature Granulocytes 0.0 <=0.4 10e3/uL    Absolute NRBCs 0.0 10e3/uL   Symptomatic Influenza A/B & SARS-CoV2 (COVID-19) Virus PCR Multiplex Nasopharyngeal     Status: Normal    Specimen: Nasopharyngeal; Swab   Result Value Ref Range    Influenza A PCR Negative Negative    Influenza B PCR Negative Negative    RSV PCR Negative Negative    SARS CoV2 PCR Negative Negative, Testing sent to reference lab. Results will be returned via unsolicited result    Narrative    Testing was performed using the Xpert Xpress CoV2/Flu/RSV Assay on the [employer] GeneXpert Instrument. This test should be ordered for the detection of SARS-CoV-2 and influenza viruses in individuals who meet clinical and/or epidemiological criteria. Test performance is unknown in asymptomatic patients.  This test is for in vitro diagnostic use under the FDA EUA for laboratories certified under CLIA to perform high or moderate complexity testing. This test has not been FDA cleared or approved. A negative result does not rule out the presence of PCR inhibitors in the specimen or target RNA in concentration below the limit of detection for the assay. If only one viral target is positive but coinfection with multiple targets is suspected, the sample should be re-tested with another FDA cleared, approved, or authorized test, if coinfection would change clinical management. This test was validated by the [email or web address]. These laboratories are certified under the Clinical  Laboratory Improvement Amendments of 1988 (CLIA-88) as qualified to perform high complexity laboratory testing.   T4 free     Status: Normal   Result Value Ref Range    Free T4 1.13 0.76 - 1.46 ng/dL   hCG qual urine POCT     Status: Normal   Result Value Ref Range    HCG Qual Urine Negative Negative    Internal QC Check POCT Valid Valid   Streptococcus A Rapid Screen w/Reflex to PCR     Status: Normal    Specimen: Throat; Swab   Result Value Ref Range    Group A Strep antigen Negative Negative   Group A Streptococcus PCR Throat Swab     Status: Normal    Specimen: Throat; Swab   Result Value Ref Range    Group A strep by PCR Not Detected Not Detected    Narrative    The Xpert Xpress Strep A test, performed on the [employer], is a rapid, qualitative in vitro diagnostic test for the detection of Streptococcus pyogenes (Group A ß-hemolytic Streptococcus, Strep A) in throat swab specimens from patients with signs and symptoms of pharyngitis. The Xpert Xpress Strep A test can be used as an aid in the diagnosis of Group A Streptococcal pharyngitis. The assay is not intended to monitor treatment for Group A Streptococcus infections. The Xpert Xpress Strep A test utilizes an automated real-time polymerase chain reaction (PCR)  to detect Streptococcus pyogenes DNA.   CBC with platelets differential     Status: None    Narrative    The following orders were created for panel order CBC with platelets differential.  Procedure                               Abnormality         Status                     ---------                               -----------         ------                     CBC with platelets and d...[[id number]]                      Final result                 Please view results for these tests on the individual orders.     Medications   potassium chloride (KAYCIEL) solution 40 mEq (40 mEq Oral Given 12/12/21 0205)   iopamidol (ISOVUE-370) solution 100 mL (80 mLs Intravenous Given 12/12/21 0219)   sodium chloride 0.9 % bag 500mL for CT scan flush use (50 mLs As instructed Given 12/12/21 0219)        Assessments & Plan (with Medical Decision Making)   A previously healthy 31-year-old woman who presented with several days of fatigue, myalgias, pruritic macular rash on the extremities and cheeks and sore throat.  Noted on physical exam to have normal vitals, no signs of hemodynamic compromise, no respiratory difficulty, mild posterior pharyngeal erythema, no obvious hepatosplenomegaly but there is scleral icterus present.  She has a macular rash on the hands and forearms as well as on the cheeks, no involvement of mucous membranes, no target lesions.  Differential includes acute viral syndrome such as infectious mononucleosis, COVID-19, hepatitis A, B or C, influenza, also consideration of streptococcal infection, direct hyperbilirubinemia related to some type of obstruction or previously undiagnosed chronic liver disease, toxic effect of medication, alcohol (patient reports she is not a significant consumer of alcohol or acetaminophen).  Her labs did reveal negative testing for Covid mono and strep.  She has direct hyperbilirubinemia and mild elevation of alk phos and liver transaminases.  Her other labs are unremarkable.  At  this point a right upper quadrant ultrasound was ordered which showed a nonspecific lucency in the IVC which could be consistent with thrombosis.  Further work-up is indicated and so a CT with contrast was ordered.  At this time the patient is being signed out to the evening physician at shift change to review that study and assist with final disposition.  Likely patient will require outpatient follow-up for recheck labs and possibly GI for further work-up, pending that study.  Will hold off on final decisions regarding anticoagulation, admission, etc. until that study is available.    I have reviewed the nursing notes. I have reviewed the findings, diagnosis, plan and need for follow up with the patient.    Discharge Medication List as of 12/12/2021  5:12 AM          Final diagnoses:   Hyperbilirubinemia   Transaminitis   Hypokalemia       --  Azael Vidal MD  [facility] EMERGENCY DEPARTMENT  12/11/2021     Azael Vidal MD  12/12/21 2318

## 2021-12-13 ENCOUNTER — TELEPHONE (OUTPATIENT)
Dept: FAMILY MEDICINE | Facility: CLINIC | Age: 31
End: 2021-12-13
Payer: COMMERCIAL

## 2021-12-13 NOTE — TELEPHONE ENCOUNTER
Incoming call from pt needing ER follow up. Pt stated she is Dr Mcgill's daughter in law and he recommended Dr Knott or Dr Ruth for pt to est care with. She is needing follow up labs for elevated bili, alt, ast and alk phos. Pt is very concerned about elevated labs and would like to be seen asap if possible. Are either of you able to see her?

## 2021-12-14 ENCOUNTER — APPOINTMENT (OUTPATIENT)
Dept: LAB | Facility: CLINIC | Age: 31
End: 2021-12-14
Payer: COMMERCIAL

## 2021-12-17 ENCOUNTER — TELEPHONE (OUTPATIENT)
Dept: FAMILY MEDICINE | Facility: CLINIC | Age: 31
End: 2021-12-17

## 2021-12-17 ENCOUNTER — OFFICE VISIT (OUTPATIENT)
Dept: FAMILY MEDICINE | Facility: CLINIC | Age: 31
End: 2021-12-17
Payer: COMMERCIAL

## 2021-12-17 VITALS
SYSTOLIC BLOOD PRESSURE: 117 MMHG | OXYGEN SATURATION: 100 % | WEIGHT: 166.6 LBS | RESPIRATION RATE: 14 BRPM | BODY MASS INDEX: 26.15 KG/M2 | TEMPERATURE: 98.4 F | HEART RATE: 80 BPM | DIASTOLIC BLOOD PRESSURE: 74 MMHG | HEIGHT: 67 IN

## 2021-12-17 DIAGNOSIS — R74.01 NONSPECIFIC ELEVATION OF LEVELS OF TRANSAMINASE AND LACTIC ACID DEHYDROGENASE (LDH): Primary | ICD-10-CM

## 2021-12-17 DIAGNOSIS — N87.1 CIN II (CERVICAL INTRAEPITHELIAL NEOPLASIA II): ICD-10-CM

## 2021-12-17 DIAGNOSIS — N89.8 VAGINAL IRRITATION: ICD-10-CM

## 2021-12-17 DIAGNOSIS — I82.3 RENAL VEIN THROMBOSIS (H): ICD-10-CM

## 2021-12-17 DIAGNOSIS — Z12.4 SCREENING FOR CERVICAL CANCER: ICD-10-CM

## 2021-12-17 DIAGNOSIS — L29.9 ITCHING: ICD-10-CM

## 2021-12-17 DIAGNOSIS — Z00.00 ENCOUNTER FOR MEDICAL EXAMINATION TO ESTABLISH CARE: ICD-10-CM

## 2021-12-17 DIAGNOSIS — R74.02 NONSPECIFIC ELEVATION OF LEVELS OF TRANSAMINASE AND LACTIC ACID DEHYDROGENASE (LDH): Primary | ICD-10-CM

## 2021-12-17 DIAGNOSIS — G47.9 DIFFICULTY SLEEPING: ICD-10-CM

## 2021-12-17 LAB
CLUE CELLS: NORMAL
TRICHOMONAS, WET PREP: NORMAL
WBC'S/HIGH POWER FIELD, WET PREP: NORMAL
YEAST, WET PREP: NORMAL

## 2021-12-17 PROCEDURE — 99205 OFFICE O/P NEW HI 60 MIN: CPT | Performed by: FAMILY MEDICINE

## 2021-12-17 PROCEDURE — G0123 SCREEN CERV/VAG THIN LAYER: HCPCS | Performed by: FAMILY MEDICINE

## 2021-12-17 PROCEDURE — 87210 SMEAR WET MOUNT SALINE/INK: CPT | Performed by: FAMILY MEDICINE

## 2021-12-17 PROCEDURE — 87624 HPV HI-RISK TYP POOLED RSLT: CPT | Performed by: FAMILY MEDICINE

## 2021-12-17 RX ORDER — CHOLESTYRAMINE 4 G/9G
1-2 POWDER, FOR SUSPENSION ORAL 2 TIMES DAILY WITH MEALS
Qty: 120 EACH | Refills: 1 | Status: SHIPPED | OUTPATIENT
Start: 2021-12-17 | End: 2022-03-23

## 2021-12-17 RX ORDER — NYSTATIN 100000 U/G
CREAM TOPICAL 3 TIMES DAILY
Qty: 30 G | Refills: 0 | Status: SHIPPED | OUTPATIENT
Start: 2021-12-17 | End: 2022-03-23

## 2021-12-17 RX ORDER — HYDROXYZINE PAMOATE 50 MG/1
50-100 CAPSULE ORAL AT BEDTIME
Qty: 60 CAPSULE | Refills: 0 | Status: SHIPPED | OUTPATIENT
Start: 2021-12-17 | End: 2022-01-10

## 2021-12-17 ASSESSMENT — MIFFLIN-ST. JEOR: SCORE: 1495.38

## 2021-12-17 NOTE — PROGRESS NOTES
ASSESSMENT and PLAN:     Assessment & Plan   Problem List Items Addressed This Visit     Vaginal irritation     Wet prep negative. Recommended trial of nystatin          Relevant Medications    nystatin (MYCOSTATIN) 787099 UNIT/GM external cream    BLANCO II (cervical intraepithelial neoplasia II)     History of BLANCO-2 status post colposcopy and biopsy.  Has had normal Pap smears and is due today so Pap smear obtained since we are doing a pelvic exam         Renal vein thrombosis (H)     Possible old or chronic thrombosis.  I recommended pursuing with imaging for confirmation since asymptomatic patients can sometimes become symptomatic.  MRA ordered         Relevant Orders    MRA Abdomen wo & w Contrast    Difficulty sleeping     Difficulty sleeping due to increased anxiety over her current clinical diagnosis as well as severe pruritus.  Will do a trial of hydroxyzine starting with 25 to 50 mg at bedtime as needed.  Can further titrate dose if needed to 100 mg         Relevant Medications    hydrOXYzine (VISTARIL) 50 MG capsule    Itching     Severe pruritus due to elevated liver transaminases.  Also accompanied with some anorexia (decreased appetite) .  Prescription sent in for cholestyramine and can also do hydroxyzine.  We will continue to trend liver transaminases and look for underlying causes of acute hepatic injury         Relevant Medications    hydrOXYzine (VISTARIL) 50 MG capsule    cholestyramine (QUESTRAN) 4 g packet    Nonspecific elevation of levels of transaminase and lactic acid dehydrogenase (LDH) - Primary     Reviewed broad differential diagnosis with patient however my suspicion most likely is that her diarrheal illness may have set things off.  Labs have been ordered for hepatitis and since she has difficulties with lab draws we will have her do this in the next couple weeks since it does not  and she just had labs done a couple days ago.  Liver transaminases trending down but still  quite elevated.  thyroid dysfunction was ruled out.  Previously normal labs back in 2018 with pregnancy.  There is a possible thrombosis from the IVC and renal vein however I do not think that this is obstructing causing hepatomegaly especially given some splenomegaly this seems more infectious.  We will also check parvovirus, CMV and EBV.  Monospot was however negative.  We will also check ferritin and iron stores as well as consideration for hemolytic anemia although hemoglobin has been stable as has the RDW.  Patient does endorse daily alcohol use not to extremes but more than typically recommended for women.  This also could have contributed slightly and I recommended until her liver transaminases resolve to abstain from any alcohol.  I do not think sertraline is contributing at this point although it was a new start.  Consider holding this medication if symptoms do not resolve.         Relevant Medications    nystatin (MYCOSTATIN) 668439 UNIT/GM external cream    Other Relevant Orders    Comprehensive metabolic panel (BMP + Alb, Alk Phos, ALT, AST, Total. Bili, TP)    CBC with platelets and differential    UA Macro with Reflex to Micro and Culture - lab collect    Wet prep - Clinic Collect (Completed)    Hepatitis A antibody IgM    Hepatitis C antibody    Ferritin    Iron and iron binding capacity    Hepatitis Antibody A IgG    Hepatitis B surface antigen    Hepatitis B Surface Antibody    Hepatitis B core antibody    Hepatitis C antibody    CMV Antibody IgG    CMV antibody IgM    EBV Capsid Antibody IgG    EBV Capsid Antibody IgM    Tissue transglutaminase krystyna IgA and IgG    Lactate Dehydrogenase    Parvovirus B19 antibodies IgG IgM      Other Visit Diagnoses     Screening for cervical cancer        Relevant Orders    Gynecologic Cytology (PAP) (Completed)    HPV High Risk Types DNA Cervical (Completed)    Encounter for medical examination to establish care        Postpartum depression    -stable on sertraline  "    Relevant Medications    hydrOXYzine (VISTARIL) 50 MG capsule           Review of external notes as documented elsewhere in note  Review of the result(s) of each unique test - Ct abdomen, US abdomen -showing possible thrombosis. possible steatosis   Ordering of each unique test  Prescription drug management2         BMI:   Estimated body mass index is 26.49 kg/m  as calculated from the following:    Height as of this encounter: 1.689 m (5' 6.5\").    Weight as of this encounter: 75.6 kg (166 lb 9.6 oz).       See Patient Instructions  Patient Instructions   Try hydroxyzine for sleep 1-2 capsules/ can do during the day if needed  - labs pending, will plan for redraw in 1-2 weeks  -possible liver consult and possible vascular??   -       No follow-ups on file.    Glo Knott MD  [facility]    There are no Patient Instructions on file for this visit.    Orders Placed This Encounter   Procedures     MRA Abdomen wo & w Contrast     Comprehensive metabolic panel (BMP + Alb, Alk Phos, ALT, AST, Total. Bili, TP)     UA Macro with Reflex to Micro and Culture - lab collect     Hepatitis A antibody IgM     Hepatitis C antibody     Ferritin     Iron and iron binding capacity     Hepatitis Antibody A IgG     HPV High Risk Types DNA Cervical     Hepatitis B surface antigen     Hepatitis B Surface Antibody     Hepatitis B core antibody     Hepatitis C antibody     CMV Antibody IgG     CMV antibody IgM     EBV Capsid Antibody IgG     EBV Capsid Antibody IgM     Tissue transglutaminase krystyna IgA and IgG     Lactate Dehydrogenase     Parvovirus B19 antibodies IgG IgM     CBC with platelets and differential     Medications Discontinued During This Encounter   Medication Reason     ferrous sulfate (FEROSUL) 325 (65 Fe) MG tablet Stopped by Patient     cholestyramine (QUESTRAN) 400 MG CAPS capsule        No follow-ups on file.    DATA REVIEWED:  I have reviewed most recent office notes in the " chart/care everywhere as well as most recent labs and imaging that is available.     60 minutes spent on the date of the encounter doing chart review, history and exam, documentation and further activities per the note    CHIEF COMPLAINT:  Patient presents with:  Hospital F/U: Feeling better.  But still having itching all over her body.  Having a lot of itching vaginally, along with a more pungent odor.  Hx of viruses attacking her liver.  She was jaundiced and very dark urine when at the ER.      HISTORY OF PRESENT ILLNESS:  Estelle Escobar is a 31 year old female is being seen for ER follow up from [facility] 12/11/2. She also plans to establish care. Daughter in Law of Dr. Mcgill  Symptoms she reports  Started late Sunday. Was feeling really exhausted. They were doing a kitchen remodel. Did 14h of work straight through sanding, etc  Started noticing urine was really dark. Usually really hydrated so that was abnormal. Friday  Wasn't sleeping well - left hand swelled up to the knuckle pins and needle feeling and swollen. Saturday started to notice face got really red and looked flushed  Had hives. Saturday was putting kids to bed, eyes looked yellow.   Urine in urgent care had high urobilinogen and ketones but says had no appetite.   Total bili elevated, alk phos and AST and ALT   -jaundice went away after hooked up to fluids. Hives disappeared.   Urine still dark. Whole body itchy now. Tongue started to go numb. Father in law thought enlarged papillae. Vaginal itching as well. Noticed some crystalized discharge.   Sensitive to cold and has splitting with ear lobes and   Sex was painful and urinating after was excruciating.    Sertraline been on a couple months. Works well for postpartum depression and anxiety  G[place] up living abroad in [place] in [facility], had all vaccinations for being abroad.   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 When she was 14 or 15- had virus that attacked liver.  "When got out of bed would pass out. Nails were peeling back. Doesn't know the name of it.   No drug use. Alcohol - 2-3 beers 5 nights a week. Nothing since not feeling well.   1 month long of diarrhea prior. Son did as well. No travel but friends 50th people. Oct 9th was party         Difficult for IV draw. Vomited while they attempted    Has twin boys 2 years old   Went into  labor at 29 weeks . Legs were so swollen.  No preeclampsia.     Recent covid booster mid November    Diarrhea entire nov through December before this started.   All stools for veronica were neg including giardia     REVIEW OF SYSTEMS:    Comprehensive review of systems is negative except as noted in the HPI.     PFSH:  Tobacco use and medications reviewed below.   Daughter in law of dr. Mcgill   TOBACCO USE:  History   Smoking Status     Never Smoker   Smokeless Tobacco     Never Used       VITALS:  Vitals:    21 1317   BP: 117/74   Pulse: 80   Resp: 14   Temp: 98.4  F (36.9  C)   TempSrc: Oral   SpO2: 100%   Weight: 75.6 kg (166 lb 9.6 oz)   Height: 1.689 m (5' 6.5\")     Wt Readings from Last 3 Encounters:   21 75.6 kg (166 lb 9.6 oz)   21 76.9 kg (169 lb 9.6 oz)   20 81 kg (178 lb 8 oz)       PHYSICAL EXAM:  Constitutional:  Reveals a 31 year old female in NAD.  Vitals:  Per nursing notes.  Eyes with scleral icterus  Neck:  Supple, thyroid not palpable.  Cardiac:  Regular rate and rhythm without murmurs, rubs, or gallops. Carotids without bruits. Legs without edema. Palpation of the radial pulse regular.  Lungs: Clear.  Respiratory effort normal.  Skin:   Without rash, bruise, or palpable lesions.  Rheumatologic: Normal joints and nails of the hands.  Neurologic:  Cranial nerves II-XII intact.     Psychiatric:  Mood appropriate, memory intact.   : mild erythema between labia minora and majora on the left. Internal speculum exam normal with normal appearing discharge.   Abdominal exam- no " hepatosplenomegaly      MEDICATIONS:  Current Outpatient Medications   Medication Sig Dispense Refill     acetaminophen (TYLENOL) 325 MG tablet Take 2 tablets (650 mg) by mouth every 6 hours as needed for mild pain Start after Delivery. 100 tablet 0     cholestyramine (QUESTRAN) 4 g packet Take 1-2 packets (4-8 g) by mouth 2 times daily (with meals) As needed for itching 120 each 1     hydrOXYzine (VISTARIL) 50 MG capsule Take 1-2 capsules ( mg) by mouth At Bedtime 60 capsule 0     levonorgestrel (MIRENA) 20 MCG/24HR IUD 1 each by Intrauterine route once       nystatin (MYCOSTATIN) 014906 UNIT/GM external cream Apply topically 3 times daily To outer vagina for rash up to 10 days as needed 30 g 0     Prenatal MV-Min-Fe Fum-FA-DHA (PRENATAL 1 PO)        sertraline (ZOLOFT) 50 MG tablet Take 1 tablet (50 mg) by mouth daily 90 tablet 3       Recent Results (from the past 720 hour(s))   Streptococcus A Rapid Screen w/Reflex to PCR    Collection Time: 12/11/21  9:13 PM    Specimen: Throat; Swab   Result Value Ref Range    Group A Strep antigen Negative Negative   Group A Streptococcus PCR Throat Swab    Collection Time: 12/11/21  9:13 PM    Specimen: Throat; Swab   Result Value Ref Range    Group A strep by PCR Not Detected Not Detected   Symptomatic Influenza A/B & SARS-CoV2 (COVID-19) Virus PCR Multiplex Nasopharyngeal    Collection Time: 12/11/21  9:13 PM    Specimen: Nasopharyngeal; Swab   Result Value Ref Range    Influenza A PCR Negative Negative    Influenza B PCR Negative Negative    RSV PCR Negative Negative    SARS CoV2 PCR Negative Negative, Testing sent to reference lab. Results will be returned via unsolicited result   Lactic acid whole blood    Collection Time: 12/11/21 10:53 PM   Result Value Ref Range    Lactic Acid 1.1 0.7 - 2.0 mmol/L   Basic metabolic panel    Collection Time: 12/11/21 10:54 PM   Result Value Ref Range    Sodium 139 133 - 144 mmol/L    Potassium 3.1 (L) 3.4 - 5.3 mmol/L     Chloride 106 94 - 109 mmol/L    Carbon Dioxide (CO2) 24 20 - 32 mmol/L    Anion Gap 9 3 - 14 mmol/L    Urea Nitrogen 7 7 - 30 mg/dL    Creatinine 0.55 0.52 - 1.04 mg/dL    Calcium 8.9 8.5 - 10.1 mg/dL    Glucose 100 (H) 70 - 99 mg/dL    GFR Estimate >90 >60 mL/min/1.73m2   Hepatic panel    Collection Time: 12/11/21 10:54 PM   Result Value Ref Range    Bilirubin Total 3.1 (H) 0.2 - 1.3 mg/dL    Bilirubin Direct 2.7 (H) 0.0 - 0.2 mg/dL    Protein Total 7.1 6.8 - 8.8 g/dL    Albumin 3.6 3.4 - 5.0 g/dL    Alkaline Phosphatase 199 (H) 40 - 150 U/L    AST 84 (H) 0 - 45 U/L     (H) 0 - 50 U/L   Mononucleosis screen    Collection Time: 12/11/21 10:54 PM   Result Value Ref Range    Mononucleosis Screen Negative Negative   INR    Collection Time: 12/11/21 10:54 PM   Result Value Ref Range    INR 0.97 0.86 - 1.14   Lipase    Collection Time: 12/11/21 10:54 PM   Result Value Ref Range    Lipase 100 73 - 393 U/L   TSH with free T4 reflex    Collection Time: 12/11/21 10:54 PM   Result Value Ref Range    TSH 4.11 (H) 0.40 - 4.00 mU/L   CBC with platelets and differential    Collection Time: 12/11/21 10:54 PM   Result Value Ref Range    WBC Count 5.3 4.0 - 11.0 10e3/uL    RBC Count 4.01 3.80 - 5.20 10e6/uL    Hemoglobin 12.2 11.7 - 15.7 g/dL    Hematocrit 36.6 35.0 - 47.0 %    MCV 91 78 - 100 fL    MCH 30.4 26.5 - 33.0 pg    MCHC 33.3 31.5 - 36.5 g/dL    RDW 12.0 10.0 - 15.0 %    Platelet Count 219 150 - 450 10e3/uL    % Neutrophils 73 %    % Lymphocytes 15 %    % Monocytes 7 %    % Eosinophils 5 %    % Basophils 0 %    % Immature Granulocytes 0 %    NRBCs per 100 WBC 0 <1 /100    Absolute Neutrophils 3.8 1.6 - 8.3 10e3/uL    Absolute Lymphocytes 0.8 0.8 - 5.3 10e3/uL    Absolute Monocytes 0.3 0.0 - 1.3 10e3/uL    Absolute Eosinophils 0.3 0.0 - 0.7 10e3/uL    Absolute Basophils 0.0 0.0 - 0.2 10e3/uL    Absolute Immature Granulocytes 0.0 <=0.4 10e3/uL    Absolute NRBCs 0.0 10e3/uL   T4 free    Collection Time: 12/11/21 10:54  PM   Result Value Ref Range    Free T4 1.13 0.76 - 1.46 ng/dL   hCG qual urine POCT    Collection Time: 12/12/21  1:51 AM   Result Value Ref Range    HCG Qual Urine Negative Negative    Internal QC Check POCT Valid Valid   Hepatic Panel    Collection Time: 12/14/21 10:59 AM   Result Value Ref Range    Bilirubin Total 2.3 (H) 0.2 - 1.3 mg/dL    Bilirubin Direct 1.8 (H) 0.0 - 0.2 mg/dL    Protein Total 7.1 6.8 - 8.8 g/dL    Albumin 3.1 (L) 3.4 - 5.0 g/dL    Alkaline Phosphatase 195 (H) 40 - 150 U/L    AST 68 (H) 0 - 45 U/L     (H) 0 - 50 U/L   Basic metabolic panel    Collection Time: 12/14/21 10:59 AM   Result Value Ref Range    Sodium 135 133 - 144 mmol/L    Potassium 3.8 3.4 - 5.3 mmol/L    Chloride 109 94 - 109 mmol/L    Carbon Dioxide (CO2) 18 (L) 20 - 32 mmol/L    Anion Gap 8 3 - 14 mmol/L    Urea Nitrogen 5 (L) 7 - 30 mg/dL    Creatinine 0.52 0.52 - 1.04 mg/dL    Calcium 8.7 8.5 - 10.1 mg/dL    Glucose 116 (H) 70 - 99 mg/dL    GFR Estimate >90 >60 mL/min/1.73m2   Wet prep - Clinic Collect    Collection Time: 12/17/21  2:28 PM    Specimen: Vagina; Swab   Result Value Ref Range    Trichomonas Absent Absent    Yeast Absent Absent    Clue Cells Absent Absent    WBCs/high power field None None   Gynecologic Cytology (PAP)    Collection Time: 12/17/21  2:28 PM   Result Value Ref Range    Interpretation        Negative for Intraepithelial Lesion or Malignancy (NILM)    Comment         Papanicolaou Test Limitations:  Cervical cytology is a screening test with limited sensitivity, and regular screening is critical for cancer prevention.  Pap tests are primarily effective for the diagnosis/prevention of squamous cell carcinoma, not adenocarcinoma or other cancers.        Specimen Adequacy       Satisfactory for evaluation, endocervical/transformation zone component present    Clinical Information       none[leep 2017      Reflex Testing Yes regardless of result     Previous Abnormal?       No      Performing Labs        The technical component of this testing was completed at [facility]     HPV High Risk Types DNA Cervical    Collection Time: 12/17/21  2:28 PM   Result Value Ref Range    Other HR HPV Negative Negative    HPV16 DNA Negative Negative    HPV18 DNA Negative Negative    FINAL DIAGNOSIS       This patient's sample is negative for HPV DNA.        This test was developed and its performance characteristics determined by the [facility], [facility]. It has not been cleared or approved by the FDA. The laboratory is regulated under CLIA as qualified to perform high-complexity testing. This test is used for clinical purposes. It should not be regarded as investigational or for research.    METHODOLOGY: The Roche Dick [id number] system uses automated extraction, simultaneous amplification of HPV (L1 region) and beta-globin, followed by real time detection of fluorescent labeled HPV and beta globin using specific oligonucleotide probes. The test specifically identified types HPV 16 DNA and HPV 18 DNA while concurrently detecting the rest of the high risk types (31, 33, 35, 39, 45, 51, 52, 56, 58, 59, 66 or 68).    COMMENTS: This test is not intended for use as a screening device for woman under age 30 with normal cervical cytology. Results should be correlated with cytologic and histologic findings. Close clinical followup is recommended.

## 2021-12-17 NOTE — TELEPHONE ENCOUNTER
----- Message from Glo Knott MD sent at 12/17/2021  4:00 PM CST -----  Call patient to let her know there was no yeast so hold off on monistat but I will call in topical nystatin that she can use on the outer part of the vagina where it is irritated 3x per day

## 2021-12-17 NOTE — PATIENT INSTRUCTIONS
Try hydroxyzine for sleep 1-2 capsules/ can do during the day if needed  - labs pending, will plan for redraw in 1-2 weeks  -possible liver consult and possible vascular??   -

## 2021-12-21 LAB
HUMAN PAPILLOMA VIRUS 16 DNA: NEGATIVE
HUMAN PAPILLOMA VIRUS 18 DNA: NEGATIVE
HUMAN PAPILLOMA VIRUS FINAL DIAGNOSIS: NORMAL
HUMAN PAPILLOMA VIRUS OTHER HR: NEGATIVE

## 2021-12-23 PROBLEM — I82.3 RENAL VEIN THROMBOSIS (H): Status: ACTIVE | Noted: 2021-12-23

## 2021-12-23 PROBLEM — R74.02 NONSPECIFIC ELEVATION OF LEVELS OF TRANSAMINASE AND LACTIC ACID DEHYDROGENASE (LDH): Status: ACTIVE | Noted: 2021-12-23

## 2021-12-23 PROBLEM — L29.9 ITCHING: Status: ACTIVE | Noted: 2021-12-23

## 2021-12-23 PROBLEM — R74.01 NONSPECIFIC ELEVATION OF LEVELS OF TRANSAMINASE AND LACTIC ACID DEHYDROGENASE (LDH): Status: ACTIVE | Noted: 2021-12-23

## 2021-12-23 PROBLEM — G47.9 DIFFICULTY SLEEPING: Status: ACTIVE | Noted: 2021-12-23

## 2021-12-23 LAB
BKR LAB AP GYN ADEQUACY: NORMAL
BKR LAB AP GYN INTERPRETATION: NORMAL
BKR LAB AP HPV REFLEX: NORMAL
BKR LAB AP PREVIOUS ABNORMAL: NORMAL
PATH REPORT.COMMENTS IMP SPEC: NORMAL
PATH REPORT.COMMENTS IMP SPEC: NORMAL
PATH REPORT.RELEVANT HX SPEC: NORMAL

## 2021-12-24 NOTE — ASSESSMENT & PLAN NOTE
Severe pruritus due to elevated liver transaminases.  Also accompanied with some anorexia (decreased appetite) .  Prescription sent in for cholestyramine and can also do hydroxyzine.  We will continue to trend liver transaminases and look for underlying causes of acute hepatic injury

## 2021-12-24 NOTE — ASSESSMENT & PLAN NOTE
Reviewed broad differential diagnosis with patient however my suspicion most likely is that her diarrheal illness may have set things off.  Labs have been ordered for hepatitis and since she has difficulties with lab draws we will have her do this in the next couple weeks since it does not  and she just had labs done a couple days ago.  Liver transaminases trending down but still quite elevated.  thyroid dysfunction was ruled out.  Previously normal labs back in 2018 with pregnancy.  There is a possible thrombosis from the IVC and renal vein however I do not think that this is obstructing causing hepatomegaly especially given some splenomegaly this seems more infectious.  We will also check parvovirus, CMV and EBV.  Monospot was however negative.  We will also check ferritin and iron stores as well as consideration for hemolytic anemia although hemoglobin has been stable as has the RDW.  Patient does endorse daily alcohol use not to extremes but more than typically recommended for women.  This also could have contributed slightly and I recommended until her liver transaminases resolve to abstain from any alcohol.  I do not think sertraline is contributing at this point although it was a new start.  Consider holding this medication if symptoms do not resolve.

## 2021-12-24 NOTE — ASSESSMENT & PLAN NOTE
Difficulty sleeping due to increased anxiety over her current clinical diagnosis as well as severe pruritus.  Will do a trial of hydroxyzine starting with 25 to 50 mg at bedtime as needed.  Can further titrate dose if needed to 100 mg

## 2021-12-24 NOTE — ASSESSMENT & PLAN NOTE
Possible old or chronic thrombosis.  I recommended pursuing with imaging for confirmation since asymptomatic patients can sometimes become symptomatic.  MRA ordered

## 2022-01-12 ENCOUNTER — LAB (OUTPATIENT)
Dept: LAB | Facility: CLINIC | Age: 32
End: 2022-01-12
Payer: COMMERCIAL

## 2022-01-12 DIAGNOSIS — R74.02 NONSPECIFIC ELEVATION OF LEVELS OF TRANSAMINASE AND LACTIC ACID DEHYDROGENASE (LDH): ICD-10-CM

## 2022-01-12 DIAGNOSIS — R74.01 NONSPECIFIC ELEVATION OF LEVELS OF TRANSAMINASE AND LACTIC ACID DEHYDROGENASE (LDH): ICD-10-CM

## 2022-01-12 LAB
ALBUMIN SERPL-MCNC: 4.4 G/DL (ref 3.5–5)
ALBUMIN UR-MCNC: NEGATIVE MG/DL
ALP SERPL-CCNC: 98 U/L (ref 45–120)
ALT SERPL W P-5'-P-CCNC: 59 U/L (ref 0–45)
ANION GAP SERPL CALCULATED.3IONS-SCNC: 11 MMOL/L (ref 5–18)
APPEARANCE UR: CLEAR
AST SERPL W P-5'-P-CCNC: 38 U/L (ref 0–40)
BASOPHILS # BLD AUTO: 0 10E3/UL (ref 0–0.2)
BASOPHILS NFR BLD AUTO: 0 %
BILIRUB SERPL-MCNC: 0.8 MG/DL (ref 0–1)
BILIRUB UR QL STRIP: NEGATIVE
BUN SERPL-MCNC: 10 MG/DL (ref 8–22)
CALCIUM SERPL-MCNC: 9.2 MG/DL (ref 8.5–10.5)
CHLORIDE BLD-SCNC: 105 MMOL/L (ref 98–107)
CO2 SERPL-SCNC: 23 MMOL/L (ref 22–31)
COLOR UR AUTO: YELLOW
CREAT SERPL-MCNC: 0.72 MG/DL (ref 0.6–1.1)
EOSINOPHIL # BLD AUTO: 0.2 10E3/UL (ref 0–0.7)
EOSINOPHIL NFR BLD AUTO: 3 %
ERYTHROCYTE [DISTWIDTH] IN BLOOD BY AUTOMATED COUNT: 11.5 % (ref 10–15)
FERRITIN SERPL-MCNC: 97 NG/ML (ref 10–130)
GFR SERPL CREATININE-BSD FRML MDRD: >90 ML/MIN/1.73M2
GLUCOSE BLD-MCNC: 116 MG/DL (ref 70–125)
GLUCOSE UR STRIP-MCNC: NEGATIVE MG/DL
HCT VFR BLD AUTO: 36.2 % (ref 35–47)
HGB BLD-MCNC: 13.4 G/DL (ref 11.7–15.7)
HGB UR QL STRIP: NEGATIVE
IMM GRANULOCYTES # BLD: 0 10E3/UL
IMM GRANULOCYTES NFR BLD: 0 %
KETONES UR STRIP-MCNC: NEGATIVE MG/DL
LDH SERPL L TO P-CCNC: 169 U/L (ref 125–220)
LEUKOCYTE ESTERASE UR QL STRIP: NEGATIVE
LYMPHOCYTES # BLD AUTO: 1.7 10E3/UL (ref 0.8–5.3)
LYMPHOCYTES NFR BLD AUTO: 20 %
MCH RBC QN AUTO: 29.6 PG (ref 26.5–33)
MCHC RBC AUTO-ENTMCNC: 37 G/DL (ref 31.5–36.5)
MCV RBC AUTO: 80 FL (ref 78–100)
MONOCYTES # BLD AUTO: 0.4 10E3/UL (ref 0–1.3)
MONOCYTES NFR BLD AUTO: 5 %
NEUTROPHILS # BLD AUTO: 6.2 10E3/UL (ref 1.6–8.3)
NEUTROPHILS NFR BLD AUTO: 72 %
NITRATE UR QL: NEGATIVE
PH UR STRIP: 6 [PH] (ref 5–8)
PLATELET # BLD AUTO: 203 10E3/UL (ref 150–450)
POTASSIUM BLD-SCNC: 3.8 MMOL/L (ref 3.5–5)
PROT SERPL-MCNC: 7.1 G/DL (ref 6–8)
RBC # BLD AUTO: 4.53 10E6/UL (ref 3.8–5.2)
SODIUM SERPL-SCNC: 139 MMOL/L (ref 136–145)
SP GR UR STRIP: 1.01 (ref 1–1.03)
UROBILINOGEN UR STRIP-ACNC: 0.2 E.U./DL
WBC # BLD AUTO: 8.6 10E3/UL (ref 4–11)

## 2022-01-12 PROCEDURE — 86706 HEP B SURFACE ANTIBODY: CPT

## 2022-01-12 PROCEDURE — 86708 HEPATITIS A ANTIBODY: CPT

## 2022-01-12 PROCEDURE — 36415 COLL VENOUS BLD VENIPUNCTURE: CPT

## 2022-01-12 PROCEDURE — 85025 COMPLETE CBC W/AUTO DIFF WBC: CPT

## 2022-01-12 PROCEDURE — 86665 EPSTEIN-BARR CAPSID VCA: CPT | Mod: 59

## 2022-01-12 PROCEDURE — 86665 EPSTEIN-BARR CAPSID VCA: CPT

## 2022-01-12 PROCEDURE — 83615 LACTATE (LD) (LDH) ENZYME: CPT

## 2022-01-12 PROCEDURE — 86803 HEPATITIS C AB TEST: CPT

## 2022-01-12 PROCEDURE — 86704 HEP B CORE ANTIBODY TOTAL: CPT

## 2022-01-12 PROCEDURE — 86709 HEPATITIS A IGM ANTIBODY: CPT

## 2022-01-12 PROCEDURE — 87340 HEPATITIS B SURFACE AG IA: CPT

## 2022-01-12 PROCEDURE — 82728 ASSAY OF FERRITIN: CPT

## 2022-01-12 PROCEDURE — 86645 CMV ANTIBODY IGM: CPT

## 2022-01-12 PROCEDURE — 81003 URINALYSIS AUTO W/O SCOPE: CPT

## 2022-01-12 PROCEDURE — 86747 PARVOVIRUS ANTIBODY: CPT | Mod: 90

## 2022-01-12 PROCEDURE — 80053 COMPREHEN METABOLIC PANEL: CPT

## 2022-01-12 PROCEDURE — 99000 SPECIMEN HANDLING OFFICE-LAB: CPT

## 2022-01-12 PROCEDURE — 83550 IRON BINDING TEST: CPT

## 2022-01-12 PROCEDURE — 86644 CMV ANTIBODY: CPT

## 2022-01-12 PROCEDURE — 86364 TISS TRNSGLTMNASE EA IG CLAS: CPT

## 2022-01-13 LAB
CMV IGG SERPL IA-ACNC: 8.8 U/ML
CMV IGG SERPL IA-ACNC: ABNORMAL
CMV IGM SERPL IA-ACNC: <8 AU/ML
CMV IGM SERPL IA-ACNC: NEGATIVE
EBV VCA IGG SER IA-ACNC: 485 U/ML
EBV VCA IGG SER IA-ACNC: POSITIVE
EBV VCA IGM SER IA-ACNC: <10 U/ML
EBV VCA IGM SER IA-ACNC: NORMAL
HBV SURFACE AB SERPLBLD QL IA.RAPID: POSITIVE
HBV SURFACE AG SERPL QL IA: NONREACTIVE
HCV AB SERPL QL IA: NEGATIVE
IRON SATN MFR SERPL: 18 % (ref 15–46)
IRON SERPL-MCNC: 71 UG/DL (ref 35–180)
TIBC SERPL-MCNC: 390 UG/DL (ref 240–430)

## 2022-01-14 LAB
B19V IGG SER IA-ACNC: 12.24 IV
B19V IGM SER IA-ACNC: 0.38 IV
HAV IGG SER QL IA: NEGATIVE
HAV IGM SERPL QL IA: NEGATIVE
HBV CORE AB SERPL QL IA: NEGATIVE

## 2022-01-17 LAB
TTG IGA SER-ACNC: 0.9 U/ML
TTG IGG SER-ACNC: 0.7 U/ML

## 2022-01-19 DIAGNOSIS — R74.02 NONSPECIFIC ELEVATION OF LEVELS OF TRANSAMINASE AND LACTIC ACID DEHYDROGENASE (LDH): Primary | ICD-10-CM

## 2022-01-19 DIAGNOSIS — R74.01 NONSPECIFIC ELEVATION OF LEVELS OF TRANSAMINASE AND LACTIC ACID DEHYDROGENASE (LDH): Primary | ICD-10-CM

## 2022-01-31 ENCOUNTER — LAB (OUTPATIENT)
Dept: LAB | Facility: CLINIC | Age: 32
End: 2022-01-31
Payer: COMMERCIAL

## 2022-01-31 DIAGNOSIS — R74.01 NONSPECIFIC ELEVATION OF LEVELS OF TRANSAMINASE AND LACTIC ACID DEHYDROGENASE (LDH): ICD-10-CM

## 2022-01-31 DIAGNOSIS — R74.02 NONSPECIFIC ELEVATION OF LEVELS OF TRANSAMINASE AND LACTIC ACID DEHYDROGENASE (LDH): ICD-10-CM

## 2022-01-31 DIAGNOSIS — Z11.3 ROUTINE SCREENING FOR STI (SEXUALLY TRANSMITTED INFECTION): ICD-10-CM

## 2022-01-31 LAB
ALBUMIN SERPL-MCNC: 3.9 G/DL (ref 3.5–5)
ALP SERPL-CCNC: 80 U/L (ref 45–120)
ALT SERPL W P-5'-P-CCNC: 11 U/L (ref 0–45)
AST SERPL W P-5'-P-CCNC: 13 U/L (ref 0–40)
BILIRUB DIRECT SERPL-MCNC: 0.2 MG/DL
BILIRUB SERPL-MCNC: 0.4 MG/DL (ref 0–1)
ERYTHROCYTE [DISTWIDTH] IN BLOOD BY AUTOMATED COUNT: 12.1 % (ref 10–15)
HCT VFR BLD AUTO: 38 % (ref 35–47)
HGB BLD-MCNC: 12.5 G/DL (ref 11.7–15.7)
HIV 1+2 AB+HIV1 P24 AG SERPL QL IA: NEGATIVE
MCH RBC QN AUTO: 29.8 PG (ref 26.5–33)
MCHC RBC AUTO-ENTMCNC: 32.9 G/DL (ref 31.5–36.5)
MCV RBC AUTO: 91 FL (ref 78–100)
PLATELET # BLD AUTO: 179 10E3/UL (ref 150–450)
PROT SERPL-MCNC: 6.9 G/DL (ref 6–8)
RBC # BLD AUTO: 4.19 10E6/UL (ref 3.8–5.2)
WBC # BLD AUTO: 7.6 10E3/UL (ref 4–11)

## 2022-01-31 PROCEDURE — 80076 HEPATIC FUNCTION PANEL: CPT

## 2022-01-31 PROCEDURE — 87591 N.GONORRHOEAE DNA AMP PROB: CPT

## 2022-01-31 PROCEDURE — 86780 TREPONEMA PALLIDUM: CPT

## 2022-01-31 PROCEDURE — 86803 HEPATITIS C AB TEST: CPT

## 2022-01-31 PROCEDURE — 87491 CHLMYD TRACH DNA AMP PROBE: CPT

## 2022-01-31 PROCEDURE — 87389 HIV-1 AG W/HIV-1&-2 AB AG IA: CPT

## 2022-01-31 PROCEDURE — 85027 COMPLETE CBC AUTOMATED: CPT

## 2022-01-31 PROCEDURE — 36415 COLL VENOUS BLD VENIPUNCTURE: CPT

## 2022-02-01 LAB
C TRACH DNA SPEC QL NAA+PROBE: NEGATIVE
HCV AB SERPL QL IA: NEGATIVE
N GONORRHOEA DNA SPEC QL NAA+PROBE: NEGATIVE
T PALLIDUM AB SER QL: NONREACTIVE

## 2022-02-04 DIAGNOSIS — G47.9 DIFFICULTY SLEEPING: ICD-10-CM

## 2022-02-04 DIAGNOSIS — L29.9 ITCHING: ICD-10-CM

## 2022-02-06 NOTE — TELEPHONE ENCOUNTER
"Routing refill request to provider for review/approval because:  Henna given x1 and patient did not follow up, please advise    Last Written Prescription Date:  1/10/22  Last Fill Quantity: 60,  # refills: 0   Last office visit provider:  12/17/21     Requested Prescriptions   Pending Prescriptions Disp Refills     hydrOXYzine (VISTARIL) 50 MG capsule [Pharmacy Med Name: HYDROXYZINE ANGELINE 50 MG CAP] 60 capsule 0     Sig: TAKE 1-2 CAPSULES ( MG) BY MOUTH AT BEDTIME SCHEDULE FOLLOW UP LABS       Antihistamines Protocol Passed - 2/4/2022 12:21 AM        Passed - Recent (12 mo) or future (30 days) visit within the authorizing provider's specialty     Patient has had an office visit with the authorizing provider or a provider within the authorizing providers department within the previous 12 mos or has a future within next 30 days. See \"Patient Info\" tab in inbasket, or \"Choose Columns\" in Meds & Orders section of the refill encounter.              Passed - Patient is age 3 or older     Apply age and/or weight-based dosing for peds patients age 3 and older.    Forward request to provider for patients under the age of 3.          Passed - Medication is active on med list             Richard Crespo RN 02/06/22 1:51 PM  "

## 2022-02-07 RX ORDER — HYDROXYZINE PAMOATE 50 MG/1
50-100 CAPSULE ORAL AT BEDTIME
Qty: 60 CAPSULE | Refills: 0 | Status: SHIPPED | OUTPATIENT
Start: 2022-02-07 | End: 2022-03-23

## 2022-03-07 NOTE — PROGRESS NOTES
"OUTPATIENT PSYCHOTHERAPY PROGRESS NOTE    Client Name: Estelle Escobar   YOB: 1990 (30 year old)   Date of Service:  August 19, 2021  Time of Service: 4:01 to 4:55 (54 minutes)  Service Type(s): 34554 psychotherapy (53-60 min. with patient and/or family)    Type of service: Telemedicine Psychotherapy  Reason for Telemedicine Visit: COVID-19 public health recommendations on in-person sessions  Mode of transmission: Secure real time interactive audio and visual telecommunication system (videoconference via [employer])  Location of originating and distant sites:    Originating site (patient location): patient home    Distant site (provider site): HIPAA compliant location within provider home/remote setting  Telemedicine Visit: The patient's condition can be safely assessed and treated via synchronous audio and visual telemedicine encounter.    Patient has agreed to receiving services via telemedicine technology.    Diagnoses:   Encounter Diagnoses   Name Primary?     Generalized anxiety disorder Yes     Major depressive disorder, recurrent episode, moderate (H)        Individuals Present:   Patient    Treatment goal(s) being addressed:   Behavioral activation  Mindfulness    Subjective:  Estelle and her twins are driving to [place] this weekend to visit Estelle's parents. Estelle has been busy packing but is feeling positive about the trip. Estelle indicated that the two women who came to help clean her basement were very helpful and that she feels good about having \"one less thing on my plate\". sEtelle shared that the Zoloft is \"really helping\" and she is \"feeling better\". She stated that her  pointed out that she is \"herself again\". Estelle expressed an interest in spreading out the frequency of therapy sessions given this.    Treatment:  Estelle participated in a session of individual psychotherapy in the framework of cognitive behavioral therapy. Discussed Estelle's goals/values around time management " Group Therapy Note    Date: 3/7/2022    Group Start Time: 1400  Group End Time: 8005  Group Topic: Healthy Living/Wellness    [facility]    Levar Neri RN        Group Therapy Note    Attendees: 6         Patient's Goal:  Forgiveness    Notes:  Good participation    Status After Intervention:  Unchanged    Participation Level:  Active Listener and Interactive    Participation Quality: Appropriate, Attentive, Sharing and Supportive      Speech:  normal      Thought Process/Content: Logical      Affective Functioning: Congruent      Mood: euthymic      Level of consciousness:  Alert, Oriented x4 and Attentive      Response to Learning: Progressing to goal      Endings: None Reported    Modes of Intervention: Socialization      Discipline Responsible: Licensed Practical Nurse      Signature:  Levar Neri RN "and productivity. Estelle stated she has identified that she would rather focus on enjoying day-to-day life instead of planning everything/managing her time. Discussed concepts of \"accepting and letting go\" and mindfulness. Given Estelle's suggestion to spread out the frequency of sessions since she is \"feeling better\", the provider suggested that the next two sessions focus predominantly on psychoeducation around skills that Estelle can utilize when she feels herself \"sliding back down\" and can practice in between sessions. Therapeutic strategies used included open-ended questioning, reflective listening, and psychoeducation.     Assessment and Progress:   Session was started on time while Estelle was at her home. She was pleasant and engaged throughout the session. She was casually dressed and neatly groomed. Her mood was euthymic. She was oriented to person, place, and time. She displayed good insight and judgment. Her speech was normal in tone, rate, and volume. Her thought content was logical and coherent. She did not endorse suicidal ideation or behavior.    Plan:   The next two sessions will focus on introducing skills to address Estelle's treatment goals and newly identified values. Our next therapy appointment is scheduled for Thursday, 8/26 at 4:00pm, after that sessions will occur every other week.       Ana Rosa Cleaning MA  Practicum Student      I did not see this patient directly. This patient was discussed with me in psychotherapy supervision, and I agree with the plan as documented.    Opal Guillen, Ph.D., L.P.  Clinical Supervisor, Department of Psychiatry and Behavioral Sciences      "

## 2022-03-07 NOTE — TELEPHONE ENCOUNTER
RECORDS RECEIVED FROM: Internal   Appt Date: 03.22.2022   NOTES STATUS DETAILS   OFFICE NOTE from referring provider Internal 02.21.2022 Glo Knott MD   DISCHARGE SUMMARY from hospital Internal  12.11.2021 Rashaun Hein MD   MEDICATION LIST Internal    IMAGING     ULTRASOUND LIVER Internal 12.12.2021 ULTRASOUND ABDOMEN LIMITED - RIGHT UPPER QUADRANT   CT OF ABDOMEN Internal 12.12.2021 CTA ABDOMEN PELVIS WITH    LABS     HEPATIC PANEL (LIVER PANEL) Internal 01.31.2022   BASIC METABOLIC PANEL Internal 12.14.2021   COMPLETE METABOLIC PANEL Internal 01.31.2022   COMPLETE BLOOD COUNT (CBC) Internal 01.31.2022   INTERNATIONAL NORMALIZED RATIO (INR) Internal 12.11.2021   HEPATITIS C ANTIBODY Internal 01.31.2022   HEPATITIS B SURFACE ANTIGEN Internal 01.12.2022   HEPATITIS B SURFACE ANTIBODY Internal 01.12.2022   HEPATITIS B CORE ANTIBODY Internal 01.12.2022

## 2022-03-22 ENCOUNTER — PRE VISIT (OUTPATIENT)
Dept: GASTROENTEROLOGY | Facility: CLINIC | Age: 32
End: 2022-03-22
Payer: COMMERCIAL

## 2022-03-22 ENCOUNTER — OFFICE VISIT (OUTPATIENT)
Dept: GASTROENTEROLOGY | Facility: CLINIC | Age: 32
End: 2022-03-22
Attending: FAMILY MEDICINE
Payer: COMMERCIAL

## 2022-03-22 VITALS
WEIGHT: 166.2 LBS | HEIGHT: 67 IN | DIASTOLIC BLOOD PRESSURE: 76 MMHG | SYSTOLIC BLOOD PRESSURE: 116 MMHG | HEART RATE: 89 BPM | OXYGEN SATURATION: 96 % | BODY MASS INDEX: 26.09 KG/M2

## 2022-03-22 DIAGNOSIS — R79.89 ABNORMAL LIVER FUNCTION TESTS: Primary | ICD-10-CM

## 2022-03-22 PROCEDURE — G0463 HOSPITAL OUTPT CLINIC VISIT: HCPCS

## 2022-03-22 PROCEDURE — 99204 OFFICE O/P NEW MOD 45 MIN: CPT | Mod: GC | Performed by: INTERNAL MEDICINE

## 2022-03-22 RX ORDER — MOMETASONE FUROATE 1 MG/G
CREAM TOPICAL
COMMUNITY
Start: 2022-02-23

## 2022-03-22 RX ORDER — CETIRIZINE HYDROCHLORIDE 10 MG/1
TABLET ORAL
COMMUNITY
Start: 2022-02-16

## 2022-03-22 ASSESSMENT — PAIN SCALES - GENERAL: PAINLEVEL: NO PAIN (0)

## 2022-03-22 NOTE — LETTER
"    3/22/2022         RE: Estelle Escobar  [address] [address]      [facility]    Hepatology consult note    Consult requested by Dr Glo Knott for elevated liver function tests.    Chief complaint:  Elevated LFTs in conjunction with recent illness     HPI:  Racquel Escobar is a 32 yo woman with insignificant past medical history referred by her PCP for elevated LFTs noted in December during a time of acute illness.     Racquel reports that during the month of November, she had been experiencing a self limiting diarrheal illness that resolved by the end of the month which she believes she obtained from her toddler. Symptoms completely resolved though at the beginning of the month of December 2021 had noted left distal upper extremity swelling, some numbness requiring elevation. Hives from torso up which lasted about a week. Accompanied by fatigue, headaches. No nausea/vomiting. No new medications at this time. 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 No congenital diseases or metabolic conditions. Had this evaluated in the emergency department initially where she was noted to have elevated LFTs with alkaline phosphatase up to 199, ALT//199, Tbili 3.1 and Dbili 2.7. Abdominal imaging with ultrasound showed small eccentric echogenic structure present within the infrahepatic inferior vena cava along with prominent liver, and follow up CT showed 1.7 x 0.5 cm calcification at the confluence of the right renal vein and IVC presumably chronic, possibly related to prior thrombosis though without any present thrombosis. Hepatosplenomegaly noted there is well with hepatic steatosis. No new therapy was given, symptoms resolved on their own, and repeat hepatic panels done over course of January showed downtrending transaminases, alkaline phosphatase and bilirubin back to completely normal levels by end of " January of this year. Hepatitis, CMV, EBV, Parvovirus, Monospot all done with results unremarkable as detailed in assessment. Strep type illness in January as well and was given abxs through father in law GP and improved. Followed by norovirus from child in February, then covid which was mild. Is vaccinated against covid and boosted. Does state that she developed a facial and chest rash in a pattern mainly surrounding her eyes and around upper chest and neck for which she is being evaluated by rheumatology.    Does state had lived in the [facility] ([facility], [facility] Father worked on offshore oil rigs. This was during younger years. Does not have any problems keeping up weight. Appetite ok. No arthropathies or joint swelling/erythema. Drinks alcohol and during pandemic had been drinking more, usually around 1-2 drinks/night. She cut back during time of elevated LFTs, though has since started drinking close to this level again.     Of note, had a similar noted laboratory evaluation for a self limiting illness when she was 14 years old that self resolved. At that time felt fatigued, exhausted, orthostatic, jaundiced, nails were paper thin.     Supplements: krill oil, fish oil, prenatal vitamins. Started zinc and turmeric after this episode happened. No other herbal supplements or over the counter medications.    Patient was recently drinking 1-2 alcoholic drinks per day.  No alcohol since recent illness. She denies any history of alcohol abuse or DUIs.      Medical hx Surgical hx   Past Medical History:   Diagnosis Date     Abnormal Pap smear of cervix 2013     BLANCO II (cervical intraepithelial neoplasia II) 2016      Past Surgical History:   Procedure Laterality Date     ARTHROSCOPIC RECONSTRUCTION ANTERIOR CRUCIATE LIGAMENT        SECTION N/A 2020    Procedure:  SECTION;  Surgeon: Alina Gama MD;  Location: [facility] L+D     EXTRACTION(S) DENTAL       LEEP TX,  "CERVICAL  8/30/2016          Medications  Prior to Admission medications    Medication Sig Start Date End Date Taking? Authorizing Provider   cetirizine (ZYRTEC) 10 MG tablet  2/16/22  Yes Reported, Patient   levonorgestrel (MIRENA) 20 MCG/24HR IUD 1 each by Intrauterine route once   Yes Reported, Patient   mometasone (ELOCON) 0.1 % external cream APPLY TO AFFECTED AREA TWICE A DAY 2/23/22  Yes Reported, Patient   Prenatal MV-Min-Fe Fum-FA-DHA (PRENATAL 1 PO)    Yes Reported, Patient   sertraline (ZOLOFT) 50 MG tablet Take 1 tablet (50 mg) by mouth daily 5/18/21  Yes Alina Gama MD   acetaminophen (TYLENOL) 325 MG tablet Take 2 tablets (650 mg) by mouth every 6 hours as needed for mild pain Start after Delivery. 1/2/20   [facility]   cholestyramine (QUESTRAN) 4 g packet Take 1-2 packets (4-8 g) by mouth 2 times daily (with meals) As needed for itching 12/17/21   Glo Knott MD   hydrOXYzine (VISTARIL) 50 MG capsule TAKE 1-2 CAPSULES ( MG) BY MOUTH AT BEDTIME SCHEDULE FOLLOW UP LABS 2/7/22   Glo Knott MD   nystatin (MYCOSTATIN) 224802 UNIT/GM external cream Apply topically 3 times daily To outer vagina for rash up to 10 days as needed 12/17/21   Glo Knott MD       Allergies  No Known Allergies    Family hx Social hx   Family History   Problem Relation Age of Onset     Cancer Maternal Grandfather         basal cell      Skin Cancer Maternal Grandfather      Diabetes Paternal Grandmother      Diabetes Paternal Grandfather       Social History     Tobacco Use     Smoking status: Never Smoker     Smokeless tobacco: Never Used   Substance Use Topics     Alcohol use: Yes     Alcohol/week: 0.0 - 1.0 standard drinks     Drug use: No          Review of systems  A 10-point review of systems was negative.    Examination  /76   Pulse 89   Ht 1.689 m (5' 6.5\")   Wt 75.4 kg (166 lb 3.2 oz)   SpO2 96%   BMI 26.42 kg/m    Body mass index is 26.42 " kg/m .    General: A&Ox4; well-appearing  HEENT: NC/AT. EOMI. No scleral icterus.  CV: no edema  Pulmonary: Symmetric chest movement. No breathing distress.  GI: Soft, non-distended, non-tender to palpation. No masses appreciated.  BS present. No hepatosplenomegaly  MSK: Normal range of motion.  No edema.  SKIN: Overall warm and dry. No rashes or bruises noted.  Neuro: Cranial nerves II-XII grossly intact.  Normal gait. No asterixis.  Psych: Mood good with congruent affect.      Laboratory  Results for EHSAN JUAREZ (MRN [id number]) as of 3/22/2022 15:04   Ref. Range 12/11/2021 22:54 12/12/2021 01:51 12/14/2021 10:59 1/12/2022 13:37 1/31/2022 15:30   Albumin Latest Ref Range: 3.5 - 5.0 g/dL 3.6  3.1 (L) 4.4 3.9   Protein Total Latest Ref Range: 6.0 - 8.0 g/dL 7.1  7.1 7.1 6.9   Bilirubin Total Latest Ref Range: 0.0 - 1.0 mg/dL 3.1 (H)  2.3 (H) 0.8 0.4   Alkaline Phosphatase Latest Ref Range: 45 - 120 U/L 199 (H)  195 (H) 98 80   ALT Latest Ref Range: 0 - 45 U/L 169 (H)  130 (H) 59 (H) 11   AST Latest Ref Range: 0 - 40 U/L 84 (H)  68 (H) 38 13   Bilirubin Direct Latest Ref Range: <=0.5 mg/dL 2.7 (H)  1.8 (H)  0.2     Results for EHSAN JUAREZ (MRN [id number]) as of 3/22/2022 15:04   Ref. Range 12/11/2021 22:54   INR Latest Ref Range: 0.86 - 1.14  0.97       Radiology  Abd U/S  CT A/P    Assessment  Ehsan Juarez is a 32 yo woman with insignificant past medical history referred by her PCP for elevated LFTs noted in December during a time of acute illness.     Self limited viral-related hepatitis   Hepatic steatosis  Initial presentation with systemic viral-like symptoms in conjunction with elevated LFTs in mixed pattern most likely mild hepatic inflammation in setting of viral illness, given self resolution without treatment in line with improvement of systemic symptoms. Did have some hepatic steatosis though feel this most likely is an element of fatty liver possibly related to alcohol use or MARION and can  improve with alcohol cessation and weight loss. Other etiologies of primary hepatitis unremarkable. No indication of active hepatitis A, is immune against hepatitis B without evidence of prior infection, and negative hepatitis C antibody. HIV negative, influenza negative, prior infection with parvovirus, CMV, EBV, VZV and negative covid at time of LFT elevation. No further evaluation or workup needed at this time. Does have some nonspecific rash possibly suspicious fo autoimmune component though do not suspect this is at play with hepatitis. Defer to rheumatology for investigation and laboratory work.    Plan  - No further evaluation needed  - Moderate alcohol intake  - Weight loss with diet and exercise  - follow-up with PCP    Discussed with Dr. Shahid Mina MD  Internal Medicine, PGY-2  Pager: [phone]     Attestation with edits by Oz Best MD at 3/23/2022  3:25 PM:  Physician Attestation   I, Oz Key MD, saw this patient and agree with the findings and plan of care as documented in the note.      Items personally reviewed/procedural attestation: vitals, labs, and imaging and agree with the interpretation documented in the note.    31 year old female with history of self-limited abnormal LFTs several years ago who presents with hand swelling, numbness and abnormal liver function tests of unclear etiology following diarrheal illness.  Symptoms fully resolved now.  Liver function tests normal.  No clinical, biochemical or radiographic evidence of cirrhosis.  Symptoms probably related to self-limited viral illness given clinical history and course.  No additional testing (including liver biopsy) or follow-up required.    We also discussed the pathophysiology, natural history and management of hepatic steatosis including moderation of alcohol consumption and weight loss with diet and exercise.    Oz Key MD

## 2022-03-22 NOTE — LETTER
"    3/22/2022         RE: Estelle Escobar  [address] [facility] [id number]        Dear Colleague,    Thank you for referring your patient, Estelle Escobar, to the [facility] [place]. Please see a copy of my visit note below.    [facility]    Hepatology consult note    Consult requested by Dr Glo Knott for elevated liver function tests.    Chief complaint:  Elevated LFTs in conjunction with recent illness     HPI:  Racquel Escobar is a 30 yo woman with insignificant past medical history referred by her PCP for elevated LFTs noted in December during a time of acute illness.     Racquel reports that during the month of November, she had been experiencing a self limiting diarrheal illness that resolved by the end of the month which she believes she obtained from her toddler. Symptoms completely resolved though at the beginning of the month of December 2021 had noted left distal upper extremity swelling, some numbness requiring elevation. Hives from torso up which lasted about a week. Accompanied by fatigue, headaches. No nausea/vomiting. No new medications at this time. 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 No congenital diseases or metabolic conditions. Had this evaluated in the emergency department initially where she was noted to have elevated LFTs with alkaline phosphatase up to 199, ALT//199, Tbili 3.1 and Dbili 2.7. Abdominal imaging with ultrasound showed small eccentric echogenic structure present within the infrahepatic inferior vena cava along with prominent liver, and follow up CT showed 1.7 x 0.5 cm calcification at the confluence of the right renal vein and IVC presumably chronic, possibly related to prior thrombosis though without any present thrombosis. Hepatosplenomegaly noted there is well with hepatic steatosis. No new therapy was given, symptoms resolved on " their own, and repeat hepatic panels done over course of January showed downtrending transaminases, alkaline phosphatase and bilirubin back to completely normal levels by end of January of this year. Hepatitis, CMV, EBV, Parvovirus, Monospot all done with results unremarkable as detailed in assessment. Strep type illness in January as well and was given abxs through father in law GP and improved. Followed by norovirus from child in February, then covid which was mild. Is vaccinated against covid and boosted. Does state that she developed a facial and chest rash in a pattern mainly surrounding her eyes and around upper chest and neck for which she is being evaluated by rheumatology.    Does state had lived in the [facility] [facility] [place], [facility] Father worked on offshore oil rigs. This was during younger years. Does not have any problems keeping up weight. Appetite ok. No arthropathies or joint swelling/erythema. Drinks alcohol and during pandemic had been drinking more, usually around 1-2 drinks/night. She cut back during time of elevated LFTs, though has since started drinking close to this level again.     Of note, had a similar noted laboratory evaluation for a self limiting illness when she was 14 years old that self resolved. At that time felt fatigued, exhausted, orthostatic, jaundiced, nails were paper thin.     Supplements: krill oil, fish oil, prenatal vitamins. Started zinc and turmeric after this episode happened. No other herbal supplements or over the counter medications.    Patient was recently drinking 1-2 alcoholic drinks per day.  No alcohol since recent illness. She denies any history of alcohol abuse or DUIs.      Medical hx Surgical hx   Past Medical History:   Diagnosis Date     Abnormal Pap smear of cervix 6/7/2013     BLANCO II (cervical intraepithelial neoplasia II) 1/7/2016      Past Surgical History:   Procedure Laterality Date     ARTHROSCOPIC RECONSTRUCTION ANTERIOR  CRUCIATE LIGAMENT        SECTION N/A 2020    Procedure:  SECTION;  Surgeon: Alina Gama MD;  Location: [facility]     EXTRACTION(S) DENTAL       LEEP TX, CERVICAL  2016          Medications  Prior to Admission medications    Medication Sig Start Date End Date Taking? Authorizing Provider   cetirizine (ZYRTEC) 10 MG tablet  22  Yes Reported, Patient   levonorgestrel (MIRENA) 20 MCG/24HR IUD 1 each by Intrauterine route once   Yes Reported, Patient   mometasone (ELOCON) 0.1 % external cream APPLY TO AFFECTED AREA TWICE A DAY 22  Yes Reported, Patient   Prenatal MV-Min-Fe Fum-FA-DHA (PRENATAL 1 PO)    Yes Reported, Patient   sertraline (ZOLOFT) 50 MG tablet Take 1 tablet (50 mg) by mouth daily 21  Yes Alina Gama MD   acetaminophen (TYLENOL) 325 MG tablet Take 2 tablets (650 mg) by mouth every 6 hours as needed for mild pain Start after Delivery. 20   [facility]   cholestyramine (QUESTRAN) 4 g packet Take 1-2 packets (4-8 g) by mouth 2 times daily (with meals) As needed for itching 21   Glo Knott MD   hydrOXYzine (VISTARIL) 50 MG capsule TAKE 1-2 CAPSULES ( MG) BY MOUTH AT BEDTIME SCHEDULE FOLLOW UP LABS 22   Glo Knott MD   nystatin (MYCOSTATIN) 252358 UNIT/GM external cream Apply topically 3 times daily To outer vagina for rash up to 10 days as needed 21   Glo Knott MD       Allergies  No Known Allergies    Family hx Social hx   Family History   Problem Relation Age of Onset     Cancer Maternal Grandfather         basal cell      Skin Cancer Maternal Grandfather      Diabetes Paternal Grandmother      Diabetes Paternal Grandfather       Social History     Tobacco Use     Smoking status: Never Smoker     Smokeless tobacco: Never Used   Substance Use Topics     Alcohol use: Yes     Alcohol/week: 0.0 - 1.0 standard drinks     Drug use: No          Review of systems  A 10-point review of  "systems was negative.    Examination  /76   Pulse 89   Ht 1.689 m (5' 6.5\")   Wt 75.4 kg (166 lb 3.2 oz)   SpO2 96%   BMI 26.42 kg/m    Body mass index is 26.42 kg/m .    General: A&Ox4; well-appearing  HEENT: NC/AT. EOMI. No scleral icterus.  CV: no edema  Pulmonary: Symmetric chest movement. No breathing distress.  GI: Soft, non-distended, non-tender to palpation. No masses appreciated.  BS present. No hepatosplenomegaly  MSK: Normal range of motion.  No edema.  SKIN: Overall warm and dry. No rashes or bruises noted.  Neuro: Cranial nerves II-XII grossly intact.  Normal gait. No asterixis.  Psych: Mood good with congruent affect.      Laboratory  Results for EHSAN JUAREZ (MRN [id number]) as of 3/22/2022 15:04   Ref. Range 12/11/2021 22:54 12/12/2021 01:51 12/14/2021 10:59 1/12/2022 13:37 1/31/2022 15:30   Albumin Latest Ref Range: 3.5 - 5.0 g/dL 3.6  3.1 (L) 4.4 3.9   Protein Total Latest Ref Range: 6.0 - 8.0 g/dL 7.1  7.1 7.1 6.9   Bilirubin Total Latest Ref Range: 0.0 - 1.0 mg/dL 3.1 (H)  2.3 (H) 0.8 0.4   Alkaline Phosphatase Latest Ref Range: 45 - 120 U/L 199 (H)  195 (H) 98 80   ALT Latest Ref Range: 0 - 45 U/L 169 (H)  130 (H) 59 (H) 11   AST Latest Ref Range: 0 - 40 U/L 84 (H)  68 (H) 38 13   Bilirubin Direct Latest Ref Range: <=0.5 mg/dL 2.7 (H)  1.8 (H)  0.2     Results for EHSAN JUAREZ (MRN [id number]) as of 3/22/2022 15:04   Ref. Range 12/11/2021 22:54   INR Latest Ref Range: 0.86 - 1.14  0.97       Radiology  Abd U/S  CT A/P    Assessment  Ehsan Juarez is a 30 yo woman with insignificant past medical history referred by her PCP for elevated LFTs noted in December during a time of acute illness.     Self limited viral-related hepatitis   Hepatic steatosis  Initial presentation with systemic viral-like symptoms in conjunction with elevated LFTs in mixed pattern most likely mild hepatic inflammation in setting of viral illness, given self resolution without treatment in line with " improvement of systemic symptoms. Did have some hepatic steatosis though feel this most likely is an element of fatty liver possibly related to alcohol use or MARION and can improve with alcohol cessation and weight loss. Other etiologies of primary hepatitis unremarkable. No indication of active hepatitis A, is immune against hepatitis B without evidence of prior infection, and negative hepatitis C antibody. HIV negative, influenza negative, prior infection with parvovirus, CMV, EBV, VZV and negative covid at time of LFT elevation. No further evaluation or workup needed at this time. Does have some nonspecific rash possibly suspicious fo autoimmune component though do not suspect this is at play with hepatitis. Defer to rheumatology for investigation and laboratory work.    Plan  - No further evaluation needed  - Moderate alcohol intake  - Weight loss with diet and exercise  - follow-up with PCP    Discussed with Dr. Shahid Mina MD  Internal Medicine, PGY-2  Pager: [phone]     Attestation with edits by Oz Best MD at 3/23/2022  3:25 PM:  Physician Attestation   I, Oz Key MD, saw this patient and agree with the findings and plan of care as documented in the note.      Items personally reviewed/procedural attestation: vitals, labs, and imaging and agree with the interpretation documented in the note.    31 year old female with history of self-limited abnormal LFTs several years ago who presents with hand swelling, numbness and abnormal liver function tests of unclear etiology following diarrheal illness.  Symptoms fully resolved now.  Liver function tests normal.  No clinical, biochemical or radiographic evidence of cirrhosis.  Symptoms probably related to self-limited viral illness given clinical history and course.  No additional testing (including liver biopsy) or follow-up required.    We also discussed the pathophysiology, natural history and management of hepatic steatosis  including moderation of alcohol consumption and weight loss with diet and exercise.    Oz Key MD

## 2022-03-22 NOTE — NURSING NOTE
"Chief Complaint   Patient presents with     Consult     Nonspecific elevation of levels of transaminase and lactic      /76   Pulse 89   Ht 1.689 m (5' 6.5\")   Wt 75.4 kg (166 lb 3.2 oz)   SpO2 96%   BMI 26.42 kg/m        Romero Cadet MA  "

## 2022-04-20 ENCOUNTER — OFFICE VISIT (OUTPATIENT)
Dept: RHEUMATOLOGY | Facility: CLINIC | Age: 32
End: 2022-04-20
Payer: COMMERCIAL

## 2022-04-20 VITALS
HEIGHT: 67 IN | BODY MASS INDEX: 26.62 KG/M2 | WEIGHT: 169.6 LBS | HEART RATE: 88 BPM | SYSTOLIC BLOOD PRESSURE: 100 MMHG | DIASTOLIC BLOOD PRESSURE: 62 MMHG

## 2022-04-20 DIAGNOSIS — M33.90 HELIOTROPE RASH OF EYELID (H): ICD-10-CM

## 2022-04-20 PROCEDURE — 99204 OFFICE O/P NEW MOD 45 MIN: CPT | Performed by: INTERNAL MEDICINE

## 2022-04-20 NOTE — PROGRESS NOTES
This document was created using a software with less than 100% fidelity, at times resulting in unintended, even erroneous syntax and grammar.  The reader is advised to keep this under consideration while reviewing, interpreting this note.      Rheumatology Consult Note      Estelle Escobar     YOB: 1990 Age: 31 year old    Date of visit: 4/20/22    PCP: Glo Knott    Chief Complaint   Patient presents with:  Consult: Heliotrope rash of eyelid, neck and ears      Assessment and Plan     Estelle was seen today for consult.    Diagnoses and all orders for this visit:    Heliotrope rash of eyelid (H)  -     Adult Rheumatology  Referral           This patient with a periorbital eruption, that also spread to her neck, is here for the concerns that this might resent heliotropic eruption.  Today I have reassured her that there is little to suggest that this represents an autoimmune process associated with some other rheumatologic conditions such as dermatomyositis.  The fact that Zyrtec alone is controlling her symptoms very well is reassuring and part of the above-noted opinion.  Should there be a change she will follow-up here otherwise continue to follow-up with her primary physician.  We discussed that while it would not be necessarily advisable to look into further testing such as looking for LUCY, for example.  I have shown her how heliotrope's look like on the Google images and how there is a superficial resemblance between her rash morphology and true heliotrope in patients who have for example dermatomyositis.      HPI   Estelle Escobar is a 31 year old female  is seen today for evaluation of rash, and a question of if this represents heliotrope eyelids.  This patient reports that in December she had felt quite unwell to the point where she had generalized fatigue, anorexia, swelling of the joints, even her left hand on the palm had become swollen and tingly, it was red,  "subsequently it healed with the shedding of the skin.  During this time she developed high colored urine.  She developed jaundice.  She was seen in the emergency room where she had abnormal liver function studies, since then she has been seen in gastroenterology, it has been deemed to be a self-limited process possibly \"viral\".    In February she developed a rash.  She has a picture on her cell phone and also sent the same picture in her chart here that I have appended below.  This led to the question of if this is a heliotropic eruption.  She was seen in dermatology.  She had patch test.  She was given local steroid cream and Zyrtec.  Over time she has found that Zyrtec alone suffices to keep it under control for the past nearly a month she has not been on steroid cream.  As long as she is on Zyrtec for example like today she has no rash.  Apart from the distribution as depicted in her picture appended below there were no other areas affected.   Patient Entered Attachment - Scan on 2/21/2022 12:12 PM: [id number]  She has not had joint pains.  She reports no photosensitivity.  No stomatitis.  She does not have history of pleuritic symptoms.  She had twins, at week 29 no history of miscarriages that she is aware of.  There is no history of DVT or PE.  She reports no muscle weakness.  She has not had oral steroids for any of these symptoms.  [place] 10 as noted in the relevant section of gastrointestinal evaluation which is reviewed today.  Her sister is a physician in [place] and mother-in-law is an internist and the father retired GP.  Collectively, the family felt that she should have further evaluation for this rash.  She is not a smoker she does not drink alcohol beyond an occasional drink.  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  She describes her self otherwise in good health.  There is no proximal distal upper or lower extremity weakness.  She is able to do her " day-to-day activities without difficulty.  She is able to go up and down the steps without difficulty.       Active Problem List     Patient Active Problem List   Diagnosis     Vaginal irritation     Acne     Family history of carrier of genetic disease     BLANCO II (cervical intraepithelial neoplasia II)     Renal vein thrombosis (H)     Difficulty sleeping     Itching     Nonspecific elevation of levels of transaminase and lactic acid dehydrogenase (LDH)     Past Medical History     Past Medical History:   Diagnosis Date     Abnormal Pap smear of cervix 2013     BLANCO II (cervical intraepithelial neoplasia II) 2016 NIL 12 LSIL 10/8/12 Colpo bx BLANCO I 3/5/13 ASC-H 13 Colpo bx neg 14 NIL 11/24/15 NIL pap, +HR HPV (not 16/18) 16 Colpo bx BLANCO II 16 NIL pap, Colpo bx BLANCO II, ECC neg 16 LEEP BLANCO I, margins neg 17 NIL pap, neg HPV (Care Everywhere) 18 NIL pap, neg HPV 21 NIL pap, neg HPV. Cotest every 3 years     Past Surgical History     Past Surgical History:   Procedure Laterality Date     ARTHROSCOPIC RECONSTRUCTION ANTERIOR CRUCIATE LIGAMENT        SECTION N/A 2020    Procedure:  SECTION;  Surgeon: Alina Gama MD;  [facility]     EXTRACTION(S) DENTAL       LEEP TX, CERVICAL  2016     Allergy   No Known Allergies  Current Medication List     Current Outpatient Medications   Medication Sig     cetirizine (ZYRTEC) 10 MG tablet      levonorgestrel (MIRENA) 20 MCG/24HR IUD 1 each by Intrauterine route once     Prenatal MV-Min-Fe Fum-FA-DHA (PRENATAL 1 PO)      sertraline (ZOLOFT) 50 MG tablet Take 1 tablet (50 mg) by mouth daily     mometasone (ELOCON) 0.1 % external cream APPLY TO AFFECTED AREA TWICE A DAY (Patient not taking: Reported on 2022)     No current facility-administered medications for this visit.            Family History     Family History   Problem Relation Age of Onset     Cancer Maternal Grandfather   "       basal cell      Skin Cancer Maternal Grandfather      Diabetes Paternal Grandmother      Diabetes Paternal Grandfather          Physical Exam     COMPREHENSIVE EXAMINATION:  Vitals:    04/20/22 1620   BP: 100/62   BP Location: Right arm   Patient Position: Sitting   Cuff Size: Adult Regular   Pulse: 88   Weight: 76.9 kg (169 lb 9.6 oz)   Height: 1.689 m (5' 6.5\")     A well appearing alert oriented female. Vital data as noted above. Her eyes examined for inflammation/scleromalacia. ENT examined for oral mucositis, moisture, thrush, nasal deformity, external ear redness, deformity. Her neck is examined for suppleness and lymphadenopathy.  Cardiopulmonary examination without dyspnea at rest, use of accessory muscles of breathing, wheezing, edema, peripheral or central cyanosis.  Abdomen examined for softness, tenderness, obvious organomegaly.  Skin examined for heliotrope, malar area eruption, lupus pernio, periungual erythema, sclerodactyly, papules, erythema nodosum, purpura, nail pitting, onycholysis, and obvious psoriasis lesion. Neurological examination done for alertness, speech, facial symmetry,  tone and power in upper and lower extremities, and gait. The joint examination is performed for swelling, tenderness, warmth, erythema, and range of motion in the following joints: DIPs, PIPs, MCPs, wrists, first CMC's, elbows, shoulders, hips, knees, ankles, feet; spine for range of motion and paraspinal muscles for tenderness. The salient normal / abnormal findings are appended.  There is no rash on her face.  She has tiny ulceration at her angle of mouth on the right side.  Consistent with daily doses been there for the past 2 to 3 weeks.  She does not have Gottron patches certainly no heliotrope's.  She does not have synovitis in any of the palpable joints.  She does not have weakness in proximal distal upper and lower extremities.  For example she is able to get up from the chair without using her hands and " also get up from the examination table once again without using her hands.    Labs / Imaging (select studies)     No results found for: PPDINDURATIO, PPDREDNESS, TBGOLT, RHF, CCPABG, URIC, [id number], [id number], ANTIDNA, ANTIDNAINT, CARIA, [id number], [id number], ENASM, ENASCL, JO1, ENASSA, ENASSB, CENTA, B2LGAPN, J6HIPAK, [id number]   Hemoglobin   Date Value Ref Range Status   01/31/2022 12.5 11.7 - 15.7 g/dL Final   01/12/2022 13.4 11.7 - 15.7 g/dL Final   12/11/2021 12.2 11.7 - 15.7 g/dL Final   01/02/2020 7.9 (L) 11.7 - 15.7 g/dL Final   01/01/2020 9.6 (L) 11.7 - 15.7 g/dL Final   01/01/2020 10.6 (L) 11.7 - 15.7 g/dL Final     Urea Nitrogen   Date Value Ref Range Status   01/12/2022 10 8 - 22 mg/dL Final   12/14/2021 5 (L) 7 - 30 mg/dL Final   12/11/2021 7 7 - 30 mg/dL Final     AST   Date Value Ref Range Status   01/31/2022 13 0 - 40 U/L Final   01/12/2022 38 0 - 40 U/L Final   12/14/2021 68 (H) 0 - 45 U/L Final   01/01/2020 24 0 - 45 U/L Final     Albumin   Date Value Ref Range Status   01/31/2022 3.9 3.5 - 5.0 g/dL Final   01/12/2022 4.4 3.5 - 5.0 g/dL Final   12/14/2021 3.1 (L) 3.4 - 5.0 g/dL Final     Alkaline Phosphatase   Date Value Ref Range Status   01/31/2022 80 45 - 120 U/L Final   01/12/2022 98 45 - 120 U/L Final   12/14/2021 195 (H) 40 - 150 U/L Final     ALT   Date Value Ref Range Status   01/31/2022 11 0 - 45 U/L Final   01/12/2022 59 (H) 0 - 45 U/L Final   12/14/2021 130 (H) 0 - 50 U/L Final   01/01/2020 19 0 - 50 U/L Final          Immunization History     Immunization History   Administered Date(s) Administered     COVID-19,PF,Pfizer (12+ Yrs) 03/24/2021     DTAP (<7y) 09/03/2008     Influenza (IIV3) PF 10/21/2014, 11/25/2015     Influenza Vaccine IM > 6 months Valent IIV4 (Alfuria,Fluzone) 11/06/2018, 10/15/2019     TDAP Vaccine (Boostrix) 12/26/2019

## 2022-05-06 ENCOUNTER — TELEPHONE (OUTPATIENT)
Dept: FAMILY MEDICINE | Facility: CLINIC | Age: 32
End: 2022-05-06
Payer: COMMERCIAL

## 2022-05-06 DIAGNOSIS — F32.A DEPRESSION, UNSPECIFIED DEPRESSION TYPE: ICD-10-CM

## 2022-05-06 NOTE — TELEPHONE ENCOUNTER
Reason for Call:  Medication or medication refill:    Do you use a [facility]?  Name of the pharmacy and phone number for the current request:    [id number] - [place], [place] - [address]    Name of the medication requested:   sertraline (ZOLOFT) 50 MG tablet      Other request: patient is out of this medication     Can we leave a detailed message on this number? YES    Phone number patient can be reached at: Home number on file [phone] (home)    Best Time: any    Call taken on 5/6/2022 at 8:51 AM by LEYLA SARMIENTO

## 2022-05-08 ENCOUNTER — HEALTH MAINTENANCE LETTER (OUTPATIENT)
Age: 32
End: 2022-05-08

## 2022-05-31 ENCOUNTER — TELEPHONE (OUTPATIENT)
Dept: FAMILY MEDICINE | Facility: CLINIC | Age: 32
End: 2022-05-31
Payer: COMMERCIAL

## 2022-05-31 DIAGNOSIS — F32.A DEPRESSION, UNSPECIFIED DEPRESSION TYPE: ICD-10-CM

## 2022-05-31 NOTE — CONFIDENTIAL NOTE
Let pt know rx sent. But probably cheaper to do 30 day rather than 3 day supply. Should tell the pharmacist- Needs override for forgotten medication (travel) or use good rx coupon which she can go to [facility] and show coupon on her phone for 30 day supply for discount

## 2022-05-31 NOTE — TELEPHONE ENCOUNTER
Patient went on vacation to [place] and forgot her Zoloft.  She is wanting a 3 day supply sent to    [address] [place]  [address]  Phone [phone]    Please call patient with questions or to advise  Ok to leave a detailed message

## 2022-07-05 ENCOUNTER — E-VISIT (OUTPATIENT)
Dept: FAMILY MEDICINE | Facility: CLINIC | Age: 32
End: 2022-07-05
Payer: COMMERCIAL

## 2022-07-05 DIAGNOSIS — B96.89 BACTERIAL VAGINOSIS: Primary | ICD-10-CM

## 2022-07-05 DIAGNOSIS — N76.0 BACTERIAL VAGINOSIS: Primary | ICD-10-CM

## 2022-07-05 PROCEDURE — 99421 OL DIG E/M SVC 5-10 MIN: CPT | Performed by: FAMILY MEDICINE

## 2022-07-06 RX ORDER — METRONIDAZOLE 500 MG/1
500 TABLET ORAL 2 TIMES DAILY
Qty: 14 TABLET | Refills: 0 | Status: SHIPPED | OUTPATIENT
Start: 2022-07-06 | End: 2022-07-13

## 2022-07-06 NOTE — PATIENT INSTRUCTIONS
Thank you for choosing us for your care. I have placed an order for a prescription so that you can start treatment.     Your symptoms [place] sound most consistent with Bacterial Vaginosis and we treat that with an antibiotic.   Only thing is no alcohol while on this medication or for at least 24 hours after because it can make you very sick and nauseated when combined. Prescription is for metronidazole twice daily x 5 days     View your full visit summary for details by clicking on the link below. Your pharmacist will able to address any questions you may have about the medication.     If you re not feeling better within 2-3 days, please schedule an appointment.  You can schedule an appointment right here in [place] [employer], or call [phone]  If the visit is for the same symptoms as your eVisit, we ll refund the cost of your eVisit if seen within seven days.      Bacterial Vaginosis    You have a vaginal infection called bacterial vaginosis (BV). Both good and bad bacteria are present in a healthy vagina. BV occurs when these bacteria get out of balance. The number of bad bacteria increase. And the number of good bacteria decrease. BV is linked with sexual activity, but it's not a sexually transmitted infection (STI).   BV may or may not cause symptoms. If symptoms do occur, they can include:     Thin, gray, milky-white, or sometimes green discharge    Unpleasant odor or  fishy  smell    Itching, burning, or pain in or around the vagina  It is not known what causes BV, but certain factors can make the problem more likely. These can include:     Douching    Spermicides    Use of antibiotics    Change in hormone levels with pregnancy, breastfeeding, or menopause    Having sex with a new partner    Having sex with more than one partner  BV will sometimes go away on its own. But treatment is often advised. This is because untreated BV can raise the risk of more serious health problems such as:     Pelvic inflammatory disease  (PID)     delivery (giving birth to a baby early if you re pregnant)    HIV and some other sexually transmitted infections (STIs)    Infection after surgery on the reproductive organs  Home care  General care    BV is most often treated with medicines called antibiotics. These may be given as pills or as a vaginal cream. If antibiotics are prescribed, be sure to use them exactly as directed. And complete all of the medicine, even if your symptoms go away.    Don't douche or having sex during treatment.    If you have sex with a female partner, ask your healthcare provider if she should also be treated.  Prevention    Don't douche.    Don't have sex. If you do have sex, then take steps to lower your risk:  ? Use condoms when having sex.  ? Limit the number of sex partners you have.    Follow-up care  Follow up with your healthcare provider, or as advised.   When to get medical advice  Call your healthcare provider right away if:     You have a fever of 100.4 F (38 C) or higher, or as directed by your provider.    Your symptoms get worse, or they don t go away within a few days of starting treatment.    You have new pain in the lower belly or pelvic region.    You have side effects that bother you or a reaction to the pills or cream you re prescribed.    You or any of your sex partners have new symptoms, such as a rash, joint pain, or sores.  [employer] last reviewed this educational content on 2020-2021 [employer]. All rights reserved. This information is not intended as a substitute for professional medical care. Always follow your healthcare professional's instructions.

## 2022-07-21 ENCOUNTER — E-VISIT (OUTPATIENT)
Dept: FAMILY MEDICINE | Facility: CLINIC | Age: 32
End: 2022-07-21
Payer: COMMERCIAL

## 2022-07-21 DIAGNOSIS — Z11.3 SCREEN FOR STD (SEXUALLY TRANSMITTED DISEASE): ICD-10-CM

## 2022-07-21 PROCEDURE — 99423 OL DIG E/M SVC 21+ MIN: CPT | Performed by: FAMILY MEDICINE

## 2022-07-21 ASSESSMENT — ANXIETY QUESTIONNAIRES
7. FEELING AFRAID AS IF SOMETHING AWFUL MIGHT HAPPEN: NOT AT ALL
GAD7 TOTAL SCORE: 12
GAD7 TOTAL SCORE: 12
3. WORRYING TOO MUCH ABOUT DIFFERENT THINGS: MORE THAN HALF THE DAYS
5. BEING SO RESTLESS THAT IT IS HARD TO SIT STILL: SEVERAL DAYS
6. BECOMING EASILY ANNOYED OR IRRITABLE: MORE THAN HALF THE DAYS
4. TROUBLE RELAXING: NEARLY EVERY DAY
8. IF YOU CHECKED OFF ANY PROBLEMS, HOW DIFFICULT HAVE THESE MADE IT FOR YOU TO DO YOUR WORK, TAKE CARE OF THINGS AT HOME, OR GET ALONG WITH OTHER PEOPLE?: SOMEWHAT DIFFICULT
GAD7 TOTAL SCORE: 12
7. FEELING AFRAID AS IF SOMETHING AWFUL MIGHT HAPPEN: NOT AT ALL
1. FEELING NERVOUS, ANXIOUS, OR ON EDGE: MORE THAN HALF THE DAYS
2. NOT BEING ABLE TO STOP OR CONTROL WORRYING: MORE THAN HALF THE DAYS

## 2022-07-21 ASSESSMENT — PATIENT HEALTH QUESTIONNAIRE - PHQ9
SUM OF ALL RESPONSES TO PHQ QUESTIONS 1-9: 15
SUM OF ALL RESPONSES TO PHQ QUESTIONS 1-9: 15
10. IF YOU CHECKED OFF ANY PROBLEMS, HOW DIFFICULT HAVE THESE PROBLEMS MADE IT FOR YOU TO DO YOUR WORK, TAKE CARE OF THINGS AT HOME, OR GET ALONG WITH OTHER PEOPLE: SOMEWHAT DIFFICULT

## 2022-07-22 ASSESSMENT — ANXIETY QUESTIONNAIRES: GAD7 TOTAL SCORE: 12

## 2022-07-22 ASSESSMENT — PATIENT HEALTH QUESTIONNAIRE - PHQ9: SUM OF ALL RESPONSES TO PHQ QUESTIONS 1-9: 15

## 2022-07-26 ENCOUNTER — LAB (OUTPATIENT)
Dept: LAB | Facility: CLINIC | Age: 32
End: 2022-07-26
Payer: COMMERCIAL

## 2022-07-26 DIAGNOSIS — Z11.3 SCREEN FOR STD (SEXUALLY TRANSMITTED DISEASE): ICD-10-CM

## 2022-07-26 PROCEDURE — 36415 COLL VENOUS BLD VENIPUNCTURE: CPT

## 2022-07-26 PROCEDURE — 87491 CHLMYD TRACH DNA AMP PROBE: CPT

## 2022-07-26 PROCEDURE — 87389 HIV-1 AG W/HIV-1&-2 AB AG IA: CPT

## 2022-07-26 PROCEDURE — 87591 N.GONORRHOEAE DNA AMP PROB: CPT

## 2022-07-26 PROCEDURE — 86780 TREPONEMA PALLIDUM: CPT

## 2022-07-27 LAB
C TRACH DNA SPEC QL NAA+PROBE: NEGATIVE
HIV 1+2 AB+HIV1 P24 AG SERPL QL IA: NONREACTIVE
N GONORRHOEA DNA SPEC QL NAA+PROBE: NEGATIVE
T PALLIDUM AB SER QL: NONREACTIVE

## 2022-07-27 RX ORDER — BUPROPION HYDROCHLORIDE 150 MG/1
150 TABLET ORAL EVERY MORNING
Qty: 30 TABLET | Refills: 1 | Status: SHIPPED | OUTPATIENT
Start: 2022-07-27 | End: 2022-09-17

## 2022-07-27 NOTE — PATIENT INSTRUCTIONS
Thank you for choosing us for your care. I have placed an order for a prescription so that you can start treatment. View your full visit summary for details by clicking on the link below. Your pharmacist will able to address any questions you may have about the medication.      If you're not feeling better within 4-6 weeks please schedule an appointment.  You can schedule an appointment right here in [facility], or call [phone]  If the visit is for the same symptoms as your eVisit, we'll refund the cost of your eVisit if seen within seven days.

## 2022-08-05 ENCOUNTER — E-VISIT (OUTPATIENT)
Dept: FAMILY MEDICINE | Facility: CLINIC | Age: 32
End: 2022-08-05
Payer: COMMERCIAL

## 2022-08-05 DIAGNOSIS — N76.0 BACTERIAL VAGINOSIS: Primary | ICD-10-CM

## 2022-08-05 DIAGNOSIS — B96.89 BACTERIAL VAGINOSIS: Primary | ICD-10-CM

## 2022-08-05 PROCEDURE — 99421 OL DIG E/M SVC 5-10 MIN: CPT | Performed by: FAMILY MEDICINE

## 2022-08-08 NOTE — PATIENT INSTRUCTIONS
Thank you for choosing us for your care. I have placed an order for a prescription so that you can start treatment. View your full visit summary for details by clicking on the link below. Your pharmacist will able to address any questions you may have about the medication.     If you re not feeling better within 2-3 days, please schedule an appointment.  You can schedule an appointment right here in [facility], or call [phone]  If the visit is for the same symptoms as your eVisit, we ll refund the cost of your eVisit if seen within seven days.

## 2022-08-09 RX ORDER — METRONIDAZOLE 500 MG/1
500 TABLET ORAL 2 TIMES DAILY
Qty: 14 TABLET | Refills: 0 | Status: SHIPPED | OUTPATIENT
Start: 2022-08-09 | End: 2022-08-09

## 2022-08-09 RX ORDER — METRONIDAZOLE 500 MG/1
500 TABLET ORAL 2 TIMES DAILY
Qty: 14 TABLET | Refills: 0 | Status: SHIPPED | OUTPATIENT
Start: 2022-08-09 | End: 2022-10-18

## 2022-09-27 ENCOUNTER — E-VISIT (OUTPATIENT)
Dept: FAMILY MEDICINE | Facility: CLINIC | Age: 32
End: 2022-09-27
Payer: COMMERCIAL

## 2022-09-27 DIAGNOSIS — N89.8 VAGINAL DISCHARGE: Primary | ICD-10-CM

## 2022-09-27 PROCEDURE — 99207 PR NON-BILLABLE SERV PER CHARTING: CPT | Performed by: FAMILY MEDICINE

## 2022-10-06 NOTE — PATIENT INSTRUCTIONS
Since communication ceased and prescription was not called in. Will close out the visit as a no charge visit

## 2022-10-18 ENCOUNTER — OFFICE VISIT (OUTPATIENT)
Dept: FAMILY MEDICINE | Facility: CLINIC | Age: 32
End: 2022-10-18
Payer: COMMERCIAL

## 2022-10-18 VITALS
OXYGEN SATURATION: 99 % | HEART RATE: 88 BPM | TEMPERATURE: 97.9 F | RESPIRATION RATE: 16 BRPM | DIASTOLIC BLOOD PRESSURE: 73 MMHG | SYSTOLIC BLOOD PRESSURE: 105 MMHG | BODY MASS INDEX: 24.68 KG/M2 | WEIGHT: 153.6 LBS | HEIGHT: 66 IN

## 2022-10-18 DIAGNOSIS — N89.8 VAGINAL DISCHARGE: ICD-10-CM

## 2022-10-18 DIAGNOSIS — G47.11 IDIOPATHIC HYPERSOMNIA: ICD-10-CM

## 2022-10-18 DIAGNOSIS — N64.4 BREAST PAIN: ICD-10-CM

## 2022-10-18 DIAGNOSIS — F33.1 MODERATE EPISODE OF RECURRENT MAJOR DEPRESSIVE DISORDER (H): ICD-10-CM

## 2022-10-18 DIAGNOSIS — Z91.89 HAS MULTIPLE SEXUAL PARTNERS: ICD-10-CM

## 2022-10-18 DIAGNOSIS — R76.8 HEPATITIS C ANTIBODY POSITIVE IN BLOOD: ICD-10-CM

## 2022-10-18 DIAGNOSIS — Z51.81 ENCOUNTER FOR THERAPEUTIC DRUG MONITORING: ICD-10-CM

## 2022-10-18 DIAGNOSIS — G47.9 DIFFICULTY SLEEPING: ICD-10-CM

## 2022-10-18 DIAGNOSIS — Z13.220 LIPID SCREENING: ICD-10-CM

## 2022-10-18 DIAGNOSIS — Z11.3 SCREEN FOR STD (SEXUALLY TRANSMITTED DISEASE): ICD-10-CM

## 2022-10-18 DIAGNOSIS — F43.9 SITUATIONAL STRESS: ICD-10-CM

## 2022-10-18 DIAGNOSIS — Z00.01 ENCOUNTER FOR GENERAL ADULT MEDICAL EXAMINATION WITH ABNORMAL FINDINGS: Primary | ICD-10-CM

## 2022-10-18 DIAGNOSIS — F41.1 GAD (GENERALIZED ANXIETY DISORDER): ICD-10-CM

## 2022-10-18 DIAGNOSIS — I82.3 RENAL VEIN THROMBOSIS (H): ICD-10-CM

## 2022-10-18 PROBLEM — L29.9 ITCHING: Status: RESOLVED | Noted: 2021-12-23 | Resolved: 2022-10-18

## 2022-10-18 LAB
ALBUMIN SERPL BCG-MCNC: 4.7 G/DL (ref 3.5–5.2)
ALBUMIN UR-MCNC: NEGATIVE MG/DL
ALP SERPL-CCNC: 54 U/L (ref 35–104)
ALT SERPL W P-5'-P-CCNC: 12 U/L (ref 10–35)
ANION GAP SERPL CALCULATED.3IONS-SCNC: 15 MMOL/L (ref 7–15)
APPEARANCE UR: CLEAR
AST SERPL W P-5'-P-CCNC: 23 U/L (ref 10–35)
BACTERIA #/AREA URNS HPF: NORMAL /HPF
BACTERIAL VAGINOSIS SMEAR: ABNORMAL
BILIRUB SERPL-MCNC: 0.7 MG/DL
BILIRUB UR QL STRIP: NEGATIVE
BUN SERPL-MCNC: 8.9 MG/DL (ref 6–20)
CALCIUM SERPL-MCNC: 9.3 MG/DL (ref 8.6–10)
CHLORIDE SERPL-SCNC: 101 MMOL/L (ref 98–107)
CHOLEST SERPL-MCNC: 208 MG/DL
CLUE CELLS: ABNORMAL
COLOR UR AUTO: YELLOW
CREAT SERPL-MCNC: 0.68 MG/DL (ref 0.51–0.95)
DEPRECATED HCO3 PLAS-SCNC: 22 MMOL/L (ref 22–29)
ERYTHROCYTE [DISTWIDTH] IN BLOOD BY AUTOMATED COUNT: 11.7 % (ref 10–15)
GFR SERPL CREATININE-BSD FRML MDRD: >90 ML/MIN/1.73M2
GLUCOSE SERPL-MCNC: 64 MG/DL (ref 70–99)
GLUCOSE UR STRIP-MCNC: NEGATIVE MG/DL
HCT VFR BLD AUTO: 41.5 % (ref 35–47)
HDLC SERPL-MCNC: 54 MG/DL
HGB BLD-MCNC: 14.1 G/DL (ref 11.7–15.7)
HGB UR QL STRIP: ABNORMAL
KETONES UR STRIP-MCNC: 80 MG/DL
LDLC SERPL CALC-MCNC: 135 MG/DL
LEUKOCYTE ESTERASE UR QL STRIP: NEGATIVE
MCH RBC QN AUTO: 30.6 PG (ref 26.5–33)
MCHC RBC AUTO-ENTMCNC: 34 G/DL (ref 31.5–36.5)
MCV RBC AUTO: 90 FL (ref 78–100)
NITRATE UR QL: NEGATIVE
NONHDLC SERPL-MCNC: 154 MG/DL
NUGENT SCORE: 7
PH UR STRIP: 6 [PH] (ref 5–8)
PLATELET # BLD AUTO: 219 10E3/UL (ref 150–450)
POTASSIUM SERPL-SCNC: 4.1 MMOL/L (ref 3.4–5.3)
PROLACTIN SERPL 3RD IS-MCNC: 5 NG/ML (ref 5–23)
PROT SERPL-MCNC: 7.4 G/DL (ref 6.4–8.3)
RBC # BLD AUTO: 4.61 10E6/UL (ref 3.8–5.2)
RBC #/AREA URNS AUTO: NORMAL /HPF
SODIUM SERPL-SCNC: 138 MMOL/L (ref 136–145)
SP GR UR STRIP: 1.01 (ref 1–1.03)
SQUAMOUS #/AREA URNS AUTO: NORMAL /LPF
TRICHOMONAS, WET PREP: ABNORMAL
TRIGL SERPL-MCNC: 96 MG/DL
UROBILINOGEN UR STRIP-ACNC: 0.2 E.U./DL
WBC # BLD AUTO: 8.6 10E3/UL (ref 4–11)
WBC #/AREA URNS AUTO: NORMAL /HPF
WBC'S/HIGH POWER FIELD, WET PREP: ABNORMAL
WHITE BLOOD CELLS: ABNORMAL
YEAST, WET PREP: PRESENT

## 2022-10-18 PROCEDURE — 86780 TREPONEMA PALLIDUM: CPT | Performed by: FAMILY MEDICINE

## 2022-10-18 PROCEDURE — 87491 CHLMYD TRACH DNA AMP PROBE: CPT | Performed by: FAMILY MEDICINE

## 2022-10-18 PROCEDURE — 80053 COMPREHEN METABOLIC PANEL: CPT | Performed by: FAMILY MEDICINE

## 2022-10-18 PROCEDURE — 87591 N.GONORRHOEAE DNA AMP PROB: CPT | Performed by: FAMILY MEDICINE

## 2022-10-18 PROCEDURE — 99395 PREV VISIT EST AGE 18-39: CPT | Performed by: FAMILY MEDICINE

## 2022-10-18 PROCEDURE — 87389 HIV-1 AG W/HIV-1&-2 AB AG IA: CPT | Performed by: FAMILY MEDICINE

## 2022-10-18 PROCEDURE — 87076 CULTURE ANAEROBE IDENT EACH: CPT | Performed by: FAMILY MEDICINE

## 2022-10-18 PROCEDURE — 86803 HEPATITIS C AB TEST: CPT | Performed by: FAMILY MEDICINE

## 2022-10-18 PROCEDURE — 80061 LIPID PANEL: CPT | Performed by: FAMILY MEDICINE

## 2022-10-18 PROCEDURE — 99214 OFFICE O/P EST MOD 30 MIN: CPT | Mod: 25 | Performed by: FAMILY MEDICINE

## 2022-10-18 PROCEDURE — 86695 HERPES SIMPLEX TYPE 1 TEST: CPT | Performed by: FAMILY MEDICINE

## 2022-10-18 PROCEDURE — 80307 DRUG TEST PRSMV CHEM ANLYZR: CPT | Performed by: FAMILY MEDICINE

## 2022-10-18 PROCEDURE — 87522 HEPATITIS C REVRS TRNSCRPJ: CPT | Performed by: FAMILY MEDICINE

## 2022-10-18 PROCEDURE — 85027 COMPLETE CBC AUTOMATED: CPT | Performed by: FAMILY MEDICINE

## 2022-10-18 PROCEDURE — 87210 SMEAR WET MOUNT SALINE/INK: CPT | Performed by: FAMILY MEDICINE

## 2022-10-18 PROCEDURE — 36415 COLL VENOUS BLD VENIPUNCTURE: CPT | Performed by: FAMILY MEDICINE

## 2022-10-18 PROCEDURE — 87109 MYCOPLASMA: CPT | Performed by: FAMILY MEDICINE

## 2022-10-18 PROCEDURE — 86696 HERPES SIMPLEX TYPE 2 TEST: CPT | Performed by: FAMILY MEDICINE

## 2022-10-18 PROCEDURE — 84146 ASSAY OF PROLACTIN: CPT | Performed by: FAMILY MEDICINE

## 2022-10-18 PROCEDURE — 87205 SMEAR GRAM STAIN: CPT | Performed by: FAMILY MEDICINE

## 2022-10-18 PROCEDURE — 87106 FUNGI IDENTIFICATION YEAST: CPT | Performed by: FAMILY MEDICINE

## 2022-10-18 PROCEDURE — 81001 URINALYSIS AUTO W/SCOPE: CPT | Mod: 59 | Performed by: FAMILY MEDICINE

## 2022-10-18 PROCEDURE — 87102 FUNGUS ISOLATION CULTURE: CPT | Performed by: FAMILY MEDICINE

## 2022-10-18 RX ORDER — BUPROPION HYDROCHLORIDE 150 MG/1
150 TABLET ORAL EVERY MORNING
Qty: 90 TABLET | Refills: 4
Start: 2022-10-18 | End: 2022-12-15

## 2022-10-18 ASSESSMENT — ENCOUNTER SYMPTOMS
NERVOUS/ANXIOUS: 1
DYSURIA: 1
NAUSEA: 0
HEMATOCHEZIA: 0
HEADACHES: 0
DIARRHEA: 0
FEVER: 0
JOINT SWELLING: 0
SORE THROAT: 0
EYE PAIN: 0
HEMATURIA: 0
CHILLS: 0
FREQUENCY: 0
SHORTNESS OF BREATH: 0
ARTHRALGIAS: 0
COUGH: 0
MYALGIAS: 0
PARESTHESIAS: 0
WEAKNESS: 0
HEARTBURN: 0
CONSTIPATION: 0
DIZZINESS: 0
PALPITATIONS: 0
ABDOMINAL PAIN: 0

## 2022-10-18 ASSESSMENT — PATIENT HEALTH QUESTIONNAIRE - PHQ9
SUM OF ALL RESPONSES TO PHQ QUESTIONS 1-9: 18
SUM OF ALL RESPONSES TO PHQ QUESTIONS 1-9: 18
10. IF YOU CHECKED OFF ANY PROBLEMS, HOW DIFFICULT HAVE THESE PROBLEMS MADE IT FOR YOU TO DO YOUR WORK, TAKE CARE OF THINGS AT HOME, OR GET ALONG WITH OTHER PEOPLE: VERY DIFFICULT

## 2022-10-18 ASSESSMENT — ANXIETY QUESTIONNAIRES
8. IF YOU CHECKED OFF ANY PROBLEMS, HOW DIFFICULT HAVE THESE MADE IT FOR YOU TO DO YOUR WORK, TAKE CARE OF THINGS AT HOME, OR GET ALONG WITH OTHER PEOPLE?: VERY DIFFICULT
1. FEELING NERVOUS, ANXIOUS, OR ON EDGE: NEARLY EVERY DAY
6. BECOMING EASILY ANNOYED OR IRRITABLE: NEARLY EVERY DAY
5. BEING SO RESTLESS THAT IT IS HARD TO SIT STILL: NEARLY EVERY DAY
7. FEELING AFRAID AS IF SOMETHING AWFUL MIGHT HAPPEN: NOT AT ALL
IF YOU CHECKED OFF ANY PROBLEMS ON THIS QUESTIONNAIRE, HOW DIFFICULT HAVE THESE PROBLEMS MADE IT FOR YOU TO DO YOUR WORK, TAKE CARE OF THINGS AT HOME, OR GET ALONG WITH OTHER PEOPLE: VERY DIFFICULT
7. FEELING AFRAID AS IF SOMETHING AWFUL MIGHT HAPPEN: NOT AT ALL
GAD7 TOTAL SCORE: 18
4. TROUBLE RELAXING: NEARLY EVERY DAY
3. WORRYING TOO MUCH ABOUT DIFFERENT THINGS: NEARLY EVERY DAY
GAD7 TOTAL SCORE: 18
GAD7 TOTAL SCORE: 18
2. NOT BEING ABLE TO STOP OR CONTROL WORRYING: NEARLY EVERY DAY

## 2022-10-18 NOTE — PROGRESS NOTES
SUBJECTIVE:   CC: Racquel is an 32 year old who presents for preventive health visit.     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   Been having some issues with tender breasts. Not related to sex. Right now normal. Intermittently will get symptoms that they are swollen and hard. Nothing expresses out. No heating pads. Couple days then resolves. Twice month. Struggling to stay awake during day- caffeine intake increased 1 cup tea in AM and 2-3 diet coke through day. . Sister narcolepsy. Idiopathic hypersomnia. Sleep study - 11 hour stretch sleep   No period with IUD so cant track. High fat low carb diet since April . > likely the reason.   Close to goal weight.   Went through couple sleep aides with neurologist to fall asleep- sonata. That worked and knocked her out. Tried in certain instances. Used to knock out 30 min. Once asleep can stay asleep. Then would use ritalin during day. Still has supply of that and does help and doesn't need soda . Was doing Ritalin 5mg max x2 . Doesn't keep up all night.   Delta 8 gummies here and there but not frequently. Uses responsibly to calm down when kids are pushing her buttons. Knows the right doses. When does that wouldn't take ritalin or caffeine at same time.  Aware of risks    Used to be on sertraline postpartum -affected libido. Mental strain of trying to keep up with kids and parenting and also the taking over [employer] business  Triggers in house. Clutter that cant get rid of. Never feels like she can get on top of it. Associates the clutter with him.   Gets irritated -then bridges that HE frustrates her. A night in hotel was great.   His depression kicked up with E&M lifestyle. He feels desirable and she doesn't have any libido.   Muted. Needs extra stimulation. Helps for mood boosting. Been 8 years now when was 24. Was on table. [facility] and growing community.   Was period of time when weaned the sertraline and back to wellbutrin, then wellbutrin kicked in and mellowed  back up  Therapy not covered.     2 x strep throat from sexual interaction.   3 round BV and 2 strep.   Not been with since antibiotics.     PHQ 2/11/2020 7/21/2022 10/18/2022   PHQ-9 Total Score 3 15 18   Q9: Thoughts of better off dead/self-harm past 2 weeks Not at all Not at all Not at all     PATIENT HEALTH QUESTIONNAIRE-9 (PHQ - 9)    Over the last 2 weeks, how often have you been bothered by any of the following problems?    1. Little interest or pleasure in doing things -  More than half the days   2. Feeling down, depressed, or hopeless -  More than half the days   3. Trouble falling or staying asleep, or sleeping too much - More than half the days   4. Feeling tired or having little energy -  Nearly every day   5. Poor appetite or overeating -  Several days   6. Feeling bad about yourself - or that you are a failure or have let yourself or your family down -  Nearly every day   7. Trouble concentrating on things, such as reading the newspaper or watching television - Nearly every day   8. Moving or speaking so slowly that other people could have noticed? Or the opposite - being so fidgety or restless that you have been moving around a lot more than usual More than half the days   9. Thoughts that you would be better off dead or of hurting  yourself in some way Not at all   Total Score: 18     If you checked off any problems, how difficult have these problems made it for you to do your work, take care of things at home, or get along with other people?      Developed by Abbe Darden, Caro Alex, Darron Galeano and colleagues, with an educational sophia from [employer] No permission required to reproduce, translate, display or distribute. permission required to reproduce, translate, display or distribute.    SEBASTIÁN-7 SCORE 2/11/2020 7/21/2022 10/18/2022   Total Score - 12 (moderate anxiety) 18 (severe anxiety)   Total Score 2 12 18                  Wt Readings from Last 3 Encounters:   10/18/22  69.7 kg (153 lb 9.6 oz)   04/20/22 76.9 kg (169 lb 9.6 oz)   03/22/22 75.4 kg (166 lb 3.2 oz)       Patient has been advised of split billing requirements and indicates understanding: Yes  Healthy Habits:     Getting at least 3 servings of Calcium per day:  Yes    Bi-annual eye exam:  Yes    Dental care twice a year:  Yes    Sleep apnea or symptoms of sleep apnea:  Daytime drowsiness    Diet:  Carbohydrate counting and Other    Frequency of exercise:  2-3 days/week    Duration of exercise:  30-45 minutes    Taking medications regularly:  Yes    Medication side effects:  None    PHQ-2 Total Score: 5    Additional concerns today:  Yes  STD  Pertinent negatives include no abdominal pain, arthralgias, chest pain, chills, congestion, coughing, fever, headaches, joint swelling, myalgias, nausea, rash, sore throat or weakness.       Today's PHQ-2 Score:   PHQ-2 ( 1999 [employer]) 10/18/2022   Q1: Little interest or pleasure in doing things 2   Q2: Feeling down, depressed or hopeless 3   PHQ-2 Score 5   PHQ-2 Total Score (12-17 Years)- Positive if 3 or more points; Administer PHQ-A if positive -   Q1: Little interest or pleasure in doing things More than half the days   Q2: Feeling down, depressed or hopeless Nearly every day   PHQ-2 Score 5       Abuse: Current or Past (Physical, Sexual or Emotional) - No  Do you feel safe in your environment? Yes    Have you ever done Advance Care Planning? (For example, a Health Directive, POLST, or a discussion with a medical provider or your loved ones about your wishes): No, advance care planning information given to patient to review.  Advanced care planning was discussed at today's visit.    Social History     Tobacco Use     Smoking status: Never     Smokeless tobacco: Never   Substance Use Topics     Alcohol use: Yes     Alcohol/week: 0.0 - 1.0 standard drinks         Alcohol Use 10/18/2022   Prescreen: >3 drinks/day or >7 drinks/week? No       Reviewed orders with patient.  Reviewed  health maintenance and updated orders accordingly - Yes  BP Readings from Last 3 Encounters:   10/18/22 105/73   22 100/62   22 116/76    Wt Readings from Last 3 Encounters:   10/18/22 69.7 kg (153 lb 9.6 oz)   22 76.9 kg (169 lb 9.6 oz)   22 75.4 kg (166 lb 3.2 oz)                  Patient Active Problem List   Diagnosis     Vaginal irritation     Acne     Family history of carrier of genetic disease     BLANCO II (cervical intraepithelial neoplasia II)     Renal vein thrombosis (H)     Difficulty sleeping     Nonspecific elevation of levels of transaminase and lactic acid dehydrogenase (LDH)     Idiopathic hypersomnia     Has multiple sexual partners     Situational stress     Past Surgical History:   Procedure Laterality Date     ARTHROSCOPIC RECONSTRUCTION ANTERIOR CRUCIATE LIGAMENT        SECTION N/A 2020    Procedure:  SECTION;  Surgeon: Alina Gama MD;  Location: [facility]     EXTRACTION(S) DENTAL       LEEP TX, CERVICAL  2016       Social History     Tobacco Use     Smoking status: Never     Smokeless tobacco: Never   Substance Use Topics     Alcohol use: Yes     Alcohol/week: 0.0 - 1.0 standard drinks     Family History   Problem Relation Age of Onset     Cancer Maternal Grandfather         basal cell      Skin Cancer Maternal Grandfather      Diabetes Paternal Grandmother      Diabetes Paternal Grandfather          Current Outpatient Medications   Medication Sig Dispense Refill     buPROPion (WELLBUTRIN XL) 150 MG 24 hr tablet Take 1 tablet (150 mg) by mouth every morning 90 tablet 4     cetirizine (ZYRTEC) 10 MG tablet        levonorgestrel (MIRENA) 20 MCG/24HR IUD 1 each by Intrauterine route once       mometasone (ELOCON) 0.1 % external cream        No Known Allergies    Breast Cancer Screening:    Breast CA Risk Assessment (FHS-7) 10/18/2022   Do you have a family history of breast, colon, or ovarian cancer? No / Unknown           Pertinent  mammograms are reviewed under the imaging tab.    History of abnormal Pap smear: NO - age 30-65 PAP every 5 years with negative HPV co-testing recommended  PAP / HPV Latest Ref Rng & Units 2021   PAP   Negative for Intraepithelial Lesion or Malignancy (NILM) - -   PAP (Historical) - - NIL NIL   HPV16 Negative Negative Negative -   HPV18 Negative Negative Negative -   HRHPV Negative Negative Negative -     Reviewed and updated as needed this visit by clinical staff   Tobacco  Allergies  Meds  Problems  Med Hx  Surg Hx  Fam Hx          Reviewed and updated as needed this visit by Provider   Tobacco  Allergies  Meds  Problems  Med Hx  Surg Hx  Fam Hx         Past Medical History:   Diagnosis Date     Abnormal Pap smear of cervix 2013     BLANCO II (cervical intraepithelial neoplasia II) 2016 NIL 12 LSIL 10/8/12 Colpo bx BLANCO I 3/5/13 ASC-H 13 Colpo bx neg 14 NIL 11/24/15 NIL pap, +HR HPV (not 16/18) 16 Colpo bx BLANCO II 16 NIL pap, Colpo bx BLANCO II, ECC neg 16 LEEP BLANCO I, margins neg 17 NIL pap, neg HPV (Care Everywhere) 18 NIL pap, neg HPV 21 NIL pap, neg HPV. Cotest every 3 years      Past Surgical History:   Procedure Laterality Date     ARTHROSCOPIC RECONSTRUCTION ANTERIOR CRUCIATE LIGAMENT        SECTION N/A 2020    Procedure:  SECTION;  Surgeon: Alina Gama MD;  [facility]: [facility]     EXTRACTION(S) DENTAL       LEEP TX, CERVICAL  2016     OB History    Para Term  AB Living   1 1 0 1 0 2   SAB IAB Ectopic Multiple Live Births   0 0 0 1 2      # Outcome Date GA Lbr Amilcar/2nd Weight Sex Delivery Anes PTL Lv   1A  20 29w2d  1.32 kg (2 lb 14.6 oz) M CS-LTranv Spinal Y MANSI      Name: ANNMALE-A ARELI      Apgar1: 8  Apgar5: 6   1B  20 29w2d   M CS-LTranv  Y MANSI      Name: ANN,MALE-B ARELI      Apgar1: 8  Apgar5: 5       Review of Systems  "  Constitutional: Negative for chills and fever.   HENT: Negative for congestion, ear pain, hearing loss and sore throat.    Eyes: Negative for pain and visual disturbance.   Respiratory: Negative for cough and shortness of breath.    Cardiovascular: Negative for chest pain, palpitations and peripheral edema.   Gastrointestinal: Negative for abdominal pain, constipation, diarrhea, heartburn, hematochezia and nausea.   Genitourinary: Positive for dysuria. Negative for frequency, genital sores, hematuria and urgency.   Musculoskeletal: Negative for arthralgias, joint swelling and myalgias.   Skin: Negative for rash.   Neurological: Negative for dizziness, weakness, headaches and paresthesias.   Psychiatric/Behavioral: Positive for mood changes. The patient is nervous/anxious.      CONSTITUTIONAL: NEGATIVE for fever, chills, change in weight  INTEGUMENTARU/SKIN: NEGATIVE for worrisome rashes, moles or lesions  EYES: NEGATIVE for vision changes or irritation  ENT: NEGATIVE for ear, mouth and throat problems  RESP: NEGATIVE for significant cough or SOB  BREAST: NEGATIVE for masses, tenderness or discharge  CV: NEGATIVE for chest pain, palpitations or peripheral edema  GI: NEGATIVE for nausea, abdominal pain, heartburn, or change in bowel habits  : NEGATIVE for unusual urinary or vaginal symptoms. Periods are regular.  MUSCULOSKELETAL: NEGATIVE for significant arthralgias or myalgia  NEURO: NEGATIVE for weakness, dizziness or paresthesias  PSYCHIATRIC: NEGATIVE for changes in mood or affect     OBJECTIVE:   /73   Pulse 88   Temp 97.9  F (36.6  C) (Oral)   Resp 16   Ht 1.683 m (5' 6.25\")   Wt 69.7 kg (153 lb 9.6 oz)   SpO2 99%   BMI 24.60 kg/m    Physical Exam  GENERAL: healthy, alert and no distress  EYES: Eyes grossly normal to inspection, PERRL and conjunctivae and sclerae normal  HENT: ear canals and TM's normal, nose and mouth without ulcers or lesions  NECK: no adenopathy, no asymmetry, masses, or " scars and thyroid normal to palpation  RESP: lungs clear to auscultation - no rales, rhonchi or wheezes  BREAST: normal without masses, tenderness or nipple discharge and no palpable axillary masses or adenopathy  CV: regular rate and rhythm, normal S1 S2, no S3 or S4, no murmur, click or rub, no peripheral edema and peripheral pulses strong  ABDOMEN: soft, nontender, no hepatosplenomegaly, no masses and bowel sounds normal   (female): normal female external genitalia, normal urethral meatus, vaginal mucosa pink, moist, well rugated, and normal cervix/adnexa/uterus without masses there is a fair amount of white discharge both thick and clear liquid in the vaginal vault..  There are some irritation in the perineal region and below  MS: no gross musculoskeletal defects noted, no edema  SKIN: no suspicious lesions or rashes  NEURO: Normal strength and tone, mentation intact and speech normal  PSYCH: mentation appears normal, affect normal/bright.  Intermittently tearful depending on what we are talking about.  LYMPH: no cervical, supraclavicular, axillary, or inguinal adenopathy    Diagnostic Test Results:  Labs reviewed in Epic  Results for orders placed or performed in visit on 10/18/22 (from the past 24 hour(s))   UA Macro with Reflex to Micro and Culture - lab collect    Specimen: Urine, NOS   Result Value Ref Range    Color Urine Yellow Colorless, Straw, Light Yellow, Yellow    Appearance Urine Clear Clear    Glucose Urine Negative Negative mg/dL    Bilirubin Urine Negative Negative    Ketones Urine 80 (A) Negative mg/dL    Specific Gravity Urine 1.015 1.005 - 1.030    Blood Urine Trace (A) Negative    pH Urine 6.0 5.0 - 8.0    Protein Albumin Urine Negative Negative mg/dL    Urobilinogen Urine 0.2 0.2, 1.0 E.U./dL    Nitrite Urine Negative Negative    Leukocyte Esterase Urine Negative Negative   Urine Microscopic   Result Value Ref Range    Bacteria Urine None Seen None Seen /HPF    RBC Urine None Seen 0-2 /HPF  /HPF    WBC Urine None Seen 0-5 /HPF /HPF    Squamous Epithelials Urine None Seen None Seen /LPF    Narrative    Urine Culture not indicated   CBC with platelets   Result Value Ref Range    WBC Count 8.6 4.0 - 11.0 10e3/uL    RBC Count 4.61 3.80 - 5.20 10e6/uL    Hemoglobin 14.1 11.7 - 15.7 g/dL    Hematocrit 41.5 35.0 - 47.0 %    MCV 90 78 - 100 fL    MCH 30.6 26.5 - 33.0 pg    MCHC 34.0 31.5 - 36.5 g/dL    RDW 11.7 10.0 - 15.0 %    Platelet Count 219 150 - 450 10e3/uL   Urine Drugs of Abuse Screen Panel 1 - Drug Screen (Full)    Narrative    The following orders were created for panel order Urine Drugs of Abuse Screen Panel 1 - Drug Screen (Full).  Procedure                               Abnormality         Status                     ---------                               -----------         ------                     Drugs of Abuse 1 Panel, ...[[id number]]                      In process                   Please view results for these tests on the individual orders.   Wet prep - Clinic Collect    Specimen: Vagina; Swab   Result Value Ref Range    Trichomonas Absent Absent    Yeast Present (A) Absent    Clue Cells Absent Absent    WBCs/high power field None None   Bacterial Vaginosis Smear    Specimen: Vagina; Swab    Narrative    The following orders were created for panel order Bacterial Vaginosis Smear.  Procedure                               Abnormality         Status                     ---------                               -----------         ------                     Bacterial Vaginosis Stain[[id number]]                        In process                   Please view results for these tests on the individual orders.       ASSESSMENT/PLAN:   Estelle was seen today for physical, depression and std.    Diagnoses and all orders for this visit:    Encounter for general adult medical examination with abnormal findings- fasting labs obtained and discussed other health preventative maintenance.  See below for  other areas focused on.  -     Comprehensive metabolic panel (BMP + Alb, Alk Phos, ALT, AST, Total. Bili, TP); Future  -     CBC with platelets; Future  -     UA Macro with Reflex to Micro and Culture - lab collect; Future  -     UA Macro with Reflex to Micro and Culture - lab collect  -     Urine Microscopic  -     Comprehensive metabolic panel (BMP + Alb, Alk Phos, ALT, AST, Total. Bili, TP)  -     CBC with platelets    Screen for STD (sexually transmitted disease)  -     HIV Antigen Antibody Combo [[id number]]; Future  -     Hepatitis C antibody [[id number]]; Future  -     Treponema Abs w Reflex to RPR and Titer [[id number]]; Future  -     Chlamydia trachomatis PCR [[id number]]; Future  -     Neisseria gonorrhoeae PCR [[id number]]; Future  -     Herpes Simplex Virus 1 and 2 IgG [[id number]]; Future  -     Yeast culture  -     Wet prep - Clinic Collect  -     HIV Antigen Antibody Combo [[id number]]  -     Hepatitis C antibody [[id number]]  -     Treponema Abs w Reflex to RPR and Titer [[id number]]  -     Herpes Simplex Virus 1 and 2 IgG [[id number]]  -     Chlamydia trachomatis PCR [[id number]]  -     Neisseria gonorrhoeae PCR [[id number]]    Vaginal discharge-patient has been treating via virtual visits and never had confirmatory testing.  She does have multiple sex partners and so therefore we will do a full STD panel today.  We discussed  that we would treat based on the wet prep but I wanted to do even further lab collection to obtain information as to whether any bacterial organisms or yeast are resistant since she has had numerous treatments with recurrent infections.  We did discuss the potential that her partner could have had or been a strep carrier and potentially transferred it to have caused her to have strep infections with oral intercourse.  Since currently she is asymptomatic there is no reason to do a strep test on the patient today.  -     Yeast culture  -     Wet prep - Clinic Collect  -     Bacterial Vaginosis Smear  -     Ureaplasma  and Mycoplasma culture; Future  -     Ureaplasma and Mycoplasma culture    Lipid screening  -     Lipid Profile (Chol, Trig, HDL, LDL calc); Future  -     Lipid Profile (Chol, Trig, HDL, LDL calc)    Moderate episode of recurrent major depressive disorder (H)/generalized anxiety disorder/situational stress-this seems to be more triggered by the anxiety and situational stress that the patient has been under.  Previously was doing okay on sertraline however changes in family dynamic of having toddler twins and getting back into the workforce has caused heightened anxiety.  Cannot tolerate sertraline because killed her libido and that in and of itself caused some marital issues.  We had put her on Wellbutrin which helped resolve the issue with libido but not quite aiming at the anxiety level that she is having and depression and also has noticed that the libido still is not as great.  We talked about going down on the dose of the Wellbutrin back down to 150 mg.  I told her I would look into whether buspirone was a good option for the generalized anxiety otherwise she was considering continuation of Gummies for as needed use which she has been doing with success during times that she needs it but I told her that I could not necessarily endorse her self-medicating in this way.  We talked about the potential for marital counseling but she indicates that he is not interested in that and also we talked about other strategies to try to improve her mental health including exercise which she is already doing and potentially listening to uplifting podcasts etc.  Indicates that mental health therapy is not covered under their insurance.    Breast pain-discussed with patient that this is probably related to the foods and dietary changes that she has made.  Recommendation is usually for a low fat high carbohydrate diet but she indicates that she will not be able to sustain a diet like that.  Also consumes a fair amount of  "caffeine.  This is due to her hypersomnia clinically diagnosed by her neurologist.  She does have a stimulant for this which she is not using regularly but we discussed that maybe that would be a better option is to consider adding back in the Ritalin and discontinuing the caffeine to see if that helped with the breast pain.  Prior to that I like her to trial vitamin E 400 units daily.  -     Prolactin; Future  -     Prolactin    Encounter for therapeutic drug monitoring  -     Urine Drugs of Abuse Screen Panel 1 - Drug Screen (Full); Future  -     Urine Drugs of Abuse Screen Panel 1 - Drug Screen (Full)    Renal vein thrombosis (H)-seen incidentally on previous CT scan.  Never did follow-up.  Asymptomatic.    Difficulty sleeping-previously diagnosed by neurology.  Not regularly taking medication for sleep.    Idiopathic hypersomnia-did have patient leave a urine drug screen today in the event that she is wanting us to take over management of her Ritalin.  She did not ask for any refills today but discussed that there would typically be a controlled substance agreement that we would have patients fill out if we were going this route.  If so I can always send this to her virtually to fill out and sign.    Has multiple sexual partners STD screening as noted above-         Other orders  -     REVIEW OF HEALTH MAINTENANCE PROTOCOL ORDERS        Patient has been advised of split billing requirements and indicates understanding: Yes    COUNSELING:  Reviewed preventive health counseling, as reflected in patient instructions       Regular exercise       Healthy diet/nutrition    Estimated body mass index is 24.6 kg/m  as calculated from the following:    Height as of this encounter: 1.683 m (5' 6.25\").    Weight as of this encounter: 69.7 kg (153 lb 9.6 oz).        She reports that she has never smoked. She has never used smokeless tobacco.    70 minutes spent on the date of the encounter doing chart review, history and " exam, documentation and further activities per the note. 50 min of which outside of prevent         Counseling Resources:  ATP IV Guidelines  Pooled Cohorts Equation Calculator  Breast Cancer Risk Calculator  BRCA-Related Cancer Risk Assessment: FHS-7 Tool  FRAX Risk Assessment  ICSI Preventive Guidelines  Dietary Guidelines for Americans, 2010  USDA's MyPlate  ASA Prophylaxis  Lung CA Screening    Glo Knott DO  [facility]  Answers for HPI/ROS submitted by the patient on 10/18/2022  If you checked off any problems, how difficult have these problems made it for you to do your work, take care of things at home, or get along with other people?: Very difficult  PHQ9 TOTAL SCORE: 18  SEBASTIÁN 7 TOTAL SCORE: 18

## 2022-10-19 LAB
AMPHETAMINES UR QL SCN: NORMAL
BARBITURATES UR QL SCN: NORMAL
BENZODIAZ UR QL SCN: NORMAL
BZE UR QL SCN: NORMAL
C TRACH DNA SPEC QL NAA+PROBE: NEGATIVE
CANNABINOIDS UR QL SCN: NORMAL
CREAT UR-MCNC: 65.2 MG/DL
HCV AB SERPL QL IA: REACTIVE
HIV 1+2 AB+HIV1 P24 AG SERPL QL IA: NONREACTIVE
HSV1 IGG SERPL QL IA: 24.8 INDEX
HSV1 IGG SERPL QL IA: ABNORMAL
HSV2 IGG SERPL QL IA: 0.11 INDEX
HSV2 IGG SERPL QL IA: ABNORMAL
N GONORRHOEA DNA SPEC QL NAA+PROBE: NEGATIVE
OPIATES UR QL SCN: NORMAL
OXYCODONE UR QL: NORMAL
PCP QUAL URINE (ROCHE): NORMAL
T PALLIDUM AB SER QL: NONREACTIVE

## 2022-10-21 LAB
BACTERIA UR CULT: ABNORMAL
BACTERIA UR CULT: ABNORMAL
HCV RNA SERPL NAA+PROBE-ACNC: NOT DETECTED IU/ML

## 2022-10-22 LAB — BACTERIA VAG AEROBE CULT: ABNORMAL

## 2023-02-21 ENCOUNTER — TRANSFERRED RECORDS (OUTPATIENT)
Dept: HEALTH INFORMATION MANAGEMENT | Facility: CLINIC | Age: 33
End: 2023-02-21

## 2023-11-10 ENCOUNTER — TRANSFERRED RECORDS (OUTPATIENT)
Dept: HEALTH INFORMATION MANAGEMENT | Facility: CLINIC | Age: 33
End: 2023-11-10
Payer: COMMERCIAL

## 2023-12-03 ENCOUNTER — HEALTH MAINTENANCE LETTER (OUTPATIENT)
Age: 33
End: 2023-12-03

## 2024-01-11 ENCOUNTER — OFFICE VISIT (OUTPATIENT)
Dept: OBGYN | Facility: CLINIC | Age: 34
End: 2024-01-11
Attending: OBSTETRICS & GYNECOLOGY
Payer: COMMERCIAL

## 2024-01-11 VITALS
HEIGHT: 66 IN | SYSTOLIC BLOOD PRESSURE: 126 MMHG | WEIGHT: 170.7 LBS | BODY MASS INDEX: 27.43 KG/M2 | HEART RATE: 77 BPM | DIASTOLIC BLOOD PRESSURE: 84 MMHG

## 2024-01-11 DIAGNOSIS — Z30.432 ENCOUNTER FOR IUD REMOVAL: ICD-10-CM

## 2024-01-11 DIAGNOSIS — Z31.69 ENCOUNTER FOR PRECONCEPTION CONSULTATION: Primary | ICD-10-CM

## 2024-01-11 PROCEDURE — 58301 REMOVE INTRAUTERINE DEVICE: CPT | Performed by: OBSTETRICS & GYNECOLOGY

## 2024-01-11 PROCEDURE — 99213 OFFICE O/P EST LOW 20 MIN: CPT | Performed by: OBSTETRICS & GYNECOLOGY

## 2024-01-11 PROCEDURE — 99213 OFFICE O/P EST LOW 20 MIN: CPT | Mod: 25 | Performed by: OBSTETRICS & GYNECOLOGY

## 2024-01-11 NOTE — PROGRESS NOTES
"[facility]  Follow-Up Visit    S: Estelle Escobar is a 33 year old  here for discussion of IUD removal and preconception counseling. She has a family history of early menopause (mom at 35, grandma at 33) and so is wondering abut screening, checking labs for menopause. She is on prozac and curious if that is safe in pregnancy. In regards to her last pregnancy, she has questions about delivery having had a c section and does not want another twin pregnancy. If she had twins she would not want to select and reduce so wants to ensure she could terminate both pregnancies if that were the case.     O:  BP (!) 131/90   Pulse 77   Ht 1.683 m (5' 6.25\")   Wt 77.4 kg (170 lb 11.2 oz)   Breastfeeding No   BMI 27.34 kg/m    Gen: Well-appearing, NAD  CV:  Well perfused  Pulm: Normal work of breathing  Ext: No LE edema, extremities warm and well perfused    Pelvic:  Normal appearing external female genitalia. Vagina is without lesions. Cervix no lesions, no cervical motion tenderness, IUD strings visible at external os. Uterus is small, mobile, non-tender. No adnexal tenderness or masses    A/P:  Ms. Estelle Escobar is a 33 year old  here for preconception counseling and IUD removal.    Preconception counseling  - no s/s of perimenopause (night sweats, trouble sleeping, hot flashes, weight gain)  - if irregular cycle 3months after IUD is removed, can do labs but no indication at this time  - prozac is safe to continue even if pregnant  - counseled on TOLAC, patient would be a good candidate  - recommended starting PNV    IUD removal  - strings visualized at external os, going posterior around back of cervix. Cyto brush used to sweep strings out from behind cervix so they were protruding from cervix. Ring forceps used to grasp strings and remove IUD. No resistance encountered. Some spotting through os at end of procedure.  - discussed that IUD stops working immediately so she is now able to get " pregnant    20 minutes were spent on preconception counseling separate from the IUD removal procedure.     Contact clinic if abnormal menses in 3 months, can order labs. Return to clinic as needed.    Patient discussed with Dr. Lanette Fischer MD  Obstetrics & Gynecology, PGY-1  01/11/2024 3:25 PM    Appreciate note by Dr. Fischer. Patient has been seen and examined by me with the resident, agree with above note. I was present for IUD removal.     Alina Gama MD

## 2024-01-11 NOTE — LETTER
"2024       RE: Estelle Escobar  [address] [id number]     Dear Colleague,    Thank you for referring your patient, Estelle Escobar, to the [facility] [place] at [facility]. Please see a copy of my visit note below.    [facility]  Follow-Up Visit    S: Estelle Escobar is a 33 year old  here for discussion of IUD removal and preconception counseling. She has a family history of early menopause (mom at 35, grandma at 33) and so is wondering abut screening, checking labs for menopause. She is on prozac and curious if that is safe in pregnancy. In regards to her last pregnancy, she has questions about delivery having had a c section and does not want another twin pregnancy. If she had twins she would not want to select and reduce so wants to ensure she could terminate both pregnancies if that were the case.     O:  BP (!) 131/90   Pulse 77   Ht 1.683 m (5' 6.25\")   Wt 77.4 kg (170 lb 11.2 oz)   Breastfeeding No   BMI 27.34 kg/m    Gen: Well-appearing, NAD  CV:  Well perfused  Pulm: Normal work of breathing  Ext: No LE edema, extremities warm and well perfused    Pelvic:  Normal appearing external female genitalia. Vagina is without lesions. Cervix no lesions, no cervical motion tenderness, IUD strings visible at external os. Uterus is small, mobile, non-tender. No adnexal tenderness or masses    A/P:  Ms. Estelle Escobar is a 33 year old  here for preconception counseling and IUD removal.    Preconception counseling  - no s/s of perimenopause (night sweats, trouble sleeping, hot flashes, weight gain)  - if irregular cycle 3months after IUD is removed, can do labs but no indication at this time  - prozac is safe to continue even if pregnant  - counseled on TOLAC, patient would be a good candidate  - recommended starting PNV    IUD removal  - strings visualized at external os, " going posterior around back of cervix. Cyto brush used to sweep strings out from behind cervix so they were protruding from cervix. Ring forceps used to grasp strings and remove IUD. No resistance encountered. Some spotting through os at end of procedure.  - discussed that IUD stops working immediately so she is now able to get pregnant    20 minutes were spent on preconception counseling separate from the IUD removal procedure.     Contact clinic if abnormal menses in 3 months, can order labs. Return to clinic as needed.    Patient discussed with Dr. Lanette Fischer MD  Obstetrics & Gynecology, PGY-1  01/11/2024 3:25 PM    Appreciate note by Dr. Fischer. Patient has been seen and examined by me with the resident, agree with above note. I was present for IUD removal.     Alina Gama MD

## 2024-01-11 NOTE — PATIENT INSTRUCTIONS
Thank you for trusting us with your care!     If you need to contact us for questions about:  Symptoms, Scheduling & Medical Questions; Non-urgent (2-3 day response) Latasha message, Urgent (needing response today) [phone] (if after 3:30pm next day response)   Prescriptions: Please call your Pharmacy   Billing: [facility] [phone] or [facility]:[phone]

## 2024-05-01 ENCOUNTER — TELEPHONE (OUTPATIENT)
Dept: OBGYN | Facility: CLINIC | Age: 34
End: 2024-05-01
Payer: COMMERCIAL

## 2025-01-05 ENCOUNTER — HEALTH MAINTENANCE LETTER (OUTPATIENT)
Age: 35
End: 2025-01-05

## (undated) DEVICE — STOCKING SLEEVE COMPRESSION CALF LG

## (undated) DEVICE — GLOVE ESTEEM POWDER FREE SMT 6.5  2D72PT65

## (undated) DEVICE — PREP CHLORAPREP 26ML TINTED ORANGE  260815

## (undated) DEVICE — DRSG STERI STRIP 1/2X4" R1547

## (undated) DEVICE — SUCTION CANISTER MEDIVAC LINER 1500ML W/LID 65651-515

## (undated) DEVICE — SU MONOCRYL 0 CT-1 36" Y346H

## (undated) DEVICE — GLOVE PROTEXIS BLUE W/NEU-THERA 6.5  2D73EB65

## (undated) DEVICE — SU VICRYL 0 CT-1 36" J346H

## (undated) DEVICE — DRAPE SETUP C-SECTION INVISISHIELD 54X90" DYNJE5600

## (undated) DEVICE — SOL WATER IRRIG 1000ML BOTTLE 07139-09

## (undated) DEVICE — STRAP KNEE/BODY 31143004

## (undated) DEVICE — BASIN SET MAJOR

## (undated) DEVICE — CATH TRAY FOLEY 16FR BARDEX W/DRAIN BAG STATLOCK 300316A

## (undated) DEVICE — PACK C-SECTION LF PL15OTA83B

## (undated) DEVICE — SPONGE SURGIFOAM 100 1974

## (undated) DEVICE — SOL NACL 0.9% IRRIG 1000ML BOTTLE 07138-09

## (undated) RX ORDER — PHENYLEPHRINE HCL IN 0.9% NACL 1 MG/10 ML
SYRINGE (ML) INTRAVENOUS
Status: DISPENSED
Start: 2020-01-01

## (undated) RX ORDER — FENTANYL CITRATE 50 UG/ML
INJECTION, SOLUTION INTRAMUSCULAR; INTRAVENOUS
Status: DISPENSED
Start: 2020-01-01

## (undated) RX ORDER — OXYTOCIN/0.9 % SODIUM CHLORIDE 30/500 ML
PLASTIC BAG, INJECTION (ML) INTRAVENOUS
Status: DISPENSED
Start: 2020-01-01

## (undated) RX ORDER — MORPHINE SULFATE 1 MG/ML
INJECTION, SOLUTION EPIDURAL; INTRATHECAL; INTRAVENOUS
Status: DISPENSED
Start: 2020-01-01

## (undated) RX ORDER — ACETAMINOPHEN 325 MG/1
TABLET ORAL
Status: DISPENSED
Start: 2020-01-01